# Patient Record
Sex: MALE | Race: WHITE | NOT HISPANIC OR LATINO | Employment: OTHER | URBAN - METROPOLITAN AREA
[De-identification: names, ages, dates, MRNs, and addresses within clinical notes are randomized per-mention and may not be internally consistent; named-entity substitution may affect disease eponyms.]

---

## 2018-08-06 ENCOUNTER — HOSPITAL ENCOUNTER (OUTPATIENT)
Dept: LAB | Facility: HOSPITAL | Age: 83
Discharge: HOME | End: 2018-08-06

## 2020-02-22 ENCOUNTER — HOSPITAL ENCOUNTER (INPATIENT)
Facility: HOSPITAL | Age: 85
LOS: 2 days | Discharge: HOME | End: 2020-02-24
Attending: EMERGENCY MEDICINE | Admitting: INTERNAL MEDICINE

## 2020-02-22 ENCOUNTER — APPOINTMENT (OUTPATIENT)
Dept: GENERAL RADIOLOGY | Facility: HOSPITAL | Age: 85
End: 2020-02-22

## 2020-02-22 DIAGNOSIS — R09.02 HYPOXIA: ICD-10-CM

## 2020-02-22 DIAGNOSIS — J44.1 COPD WITH ACUTE EXACERBATION (HCC): ICD-10-CM

## 2020-02-22 DIAGNOSIS — R06.03 ACUTE RESPIRATORY DISTRESS: Primary | ICD-10-CM

## 2020-02-22 DIAGNOSIS — J18.9 PNEUMONIA OF RIGHT LOWER LOBE DUE TO INFECTIOUS ORGANISM: ICD-10-CM

## 2020-02-22 LAB
ALBUMIN SERPL-MCNC: 3.7 G/DL (ref 3.5–5.2)
ALBUMIN/GLOB SERPL: 0.9 G/DL
ALP SERPL-CCNC: 88 U/L (ref 39–117)
ALT SERPL W P-5'-P-CCNC: 14 U/L (ref 1–41)
ANION GAP SERPL CALCULATED.3IONS-SCNC: 11 MMOL/L (ref 5–15)
AST SERPL-CCNC: 28 U/L (ref 1–40)
B PERT DNA SPEC QL NAA+PROBE: NOT DETECTED
BILIRUB SERPL-MCNC: 2.1 MG/DL (ref 0.2–1.2)
BUN BLD-MCNC: 30 MG/DL (ref 8–23)
BUN/CREAT SERPL: 34.1 (ref 7–25)
C PNEUM DNA NPH QL NAA+NON-PROBE: NOT DETECTED
CALCIUM SPEC-SCNC: 11.4 MG/DL (ref 8.6–10.5)
CHLORIDE SERPL-SCNC: 94 MMOL/L (ref 98–107)
CO2 SERPL-SCNC: 25 MMOL/L (ref 22–29)
CREAT BLD-MCNC: 0.88 MG/DL (ref 0.76–1.27)
DEPRECATED RDW RBC AUTO: 43.8 FL (ref 37–54)
ERYTHROCYTE [DISTWIDTH] IN BLOOD BY AUTOMATED COUNT: 14.9 % (ref 12.3–15.4)
FLUAV H1 2009 PAND RNA NPH QL NAA+PROBE: NOT DETECTED
FLUAV H1 HA GENE NPH QL NAA+PROBE: NOT DETECTED
FLUAV H3 RNA NPH QL NAA+PROBE: NOT DETECTED
FLUAV SUBTYP SPEC NAA+PROBE: NOT DETECTED
FLUAV SUBTYP SPEC NAA+PROBE: NOT DETECTED
FLUBV RNA ISLT QL NAA+PROBE: NOT DETECTED
FLUBV RNA ISLT QL NAA+PROBE: NOT DETECTED
GFR SERPL CREATININE-BSD FRML MDRD: 82 ML/MIN/1.73
GIANT PLATELETS: ABNORMAL
GLOBULIN UR ELPH-MCNC: 4 GM/DL
GLUCOSE BLD-MCNC: 121 MG/DL (ref 65–99)
HADV DNA SPEC NAA+PROBE: NOT DETECTED
HCOV 229E RNA SPEC QL NAA+PROBE: NOT DETECTED
HCOV HKU1 RNA SPEC QL NAA+PROBE: NOT DETECTED
HCOV NL63 RNA SPEC QL NAA+PROBE: NOT DETECTED
HCOV OC43 RNA SPEC QL NAA+PROBE: NOT DETECTED
HCT VFR BLD AUTO: 42.2 % (ref 37.5–51)
HGB BLD-MCNC: 14.6 G/DL (ref 13–17.7)
HMPV RNA NPH QL NAA+NON-PROBE: NOT DETECTED
HPIV1 RNA SPEC QL NAA+PROBE: NOT DETECTED
HPIV2 RNA SPEC QL NAA+PROBE: NOT DETECTED
HPIV3 RNA NPH QL NAA+PROBE: NOT DETECTED
HPIV4 P GENE NPH QL NAA+PROBE: NOT DETECTED
L PNEUMO1 AG UR QL IA: NEGATIVE
LYMPHOCYTES # BLD MANUAL: 0.98 10*3/MM3 (ref 0.7–3.1)
LYMPHOCYTES NFR BLD MANUAL: 12 % (ref 19.6–45.3)
LYMPHOCYTES NFR BLD MANUAL: 14 % (ref 5–12)
M PNEUMO IGG SER IA-ACNC: NOT DETECTED
MCH RBC QN AUTO: 28.9 PG (ref 26.6–33)
MCHC RBC AUTO-ENTMCNC: 34.6 G/DL (ref 31.5–35.7)
MCV RBC AUTO: 83.5 FL (ref 79–97)
MONOCYTES # BLD AUTO: 1.15 10*3/MM3 (ref 0.1–0.9)
NEUTROPHILS # BLD AUTO: 6.07 10*3/MM3 (ref 1.7–7)
NEUTROPHILS NFR BLD MANUAL: 69 % (ref 42.7–76)
NEUTS BAND NFR BLD MANUAL: 5 % (ref 0–5)
NT-PROBNP SERPL-MCNC: 352.8 PG/ML (ref 5–1800)
PLATELET # BLD AUTO: 189 10*3/MM3 (ref 140–450)
PMV BLD AUTO: 8.6 FL (ref 6–12)
POTASSIUM BLD-SCNC: 4.8 MMOL/L (ref 3.5–5.2)
PROT SERPL-MCNC: 7.7 G/DL (ref 6–8.5)
RBC # BLD AUTO: 5.06 10*6/MM3 (ref 4.14–5.8)
RBC MORPH BLD: NORMAL
RHINOVIRUS RNA SPEC NAA+PROBE: DETECTED
RSV RNA NPH QL NAA+NON-PROBE: NOT DETECTED
S PNEUM AG SPEC QL LA: POSITIVE
SCAN SLIDE: NORMAL
SODIUM BLD-SCNC: 130 MMOL/L (ref 136–145)
TROPONIN T SERPL-MCNC: <0.01 NG/ML (ref 0–0.03)
WBC MORPH BLD: NORMAL
WBC NRBC COR # BLD: 8.2 10*3/MM3 (ref 3.4–10.8)

## 2020-02-22 PROCEDURE — 84484 ASSAY OF TROPONIN QUANT: CPT | Performed by: EMERGENCY MEDICINE

## 2020-02-22 PROCEDURE — 87502 INFLUENZA DNA AMP PROBE: CPT | Performed by: EMERGENCY MEDICINE

## 2020-02-22 PROCEDURE — 94640 AIRWAY INHALATION TREATMENT: CPT

## 2020-02-22 PROCEDURE — 87040 BLOOD CULTURE FOR BACTERIA: CPT | Performed by: EMERGENCY MEDICINE

## 2020-02-22 PROCEDURE — 99222 1ST HOSP IP/OBS MODERATE 55: CPT | Performed by: HOSPITALIST

## 2020-02-22 PROCEDURE — 97165 OT EVAL LOW COMPLEX 30 MIN: CPT

## 2020-02-22 PROCEDURE — 87899 AGENT NOS ASSAY W/OPTIC: CPT | Performed by: HOSPITALIST

## 2020-02-22 PROCEDURE — 83880 ASSAY OF NATRIURETIC PEPTIDE: CPT | Performed by: EMERGENCY MEDICINE

## 2020-02-22 PROCEDURE — 85025 COMPLETE CBC W/AUTO DIFF WBC: CPT | Performed by: EMERGENCY MEDICINE

## 2020-02-22 PROCEDURE — 97535 SELF CARE MNGMENT TRAINING: CPT

## 2020-02-22 PROCEDURE — 97116 GAIT TRAINING THERAPY: CPT

## 2020-02-22 PROCEDURE — 25010000002 CEFTRIAXONE PER 250 MG: Performed by: EMERGENCY MEDICINE

## 2020-02-22 PROCEDURE — 80053 COMPREHEN METABOLIC PANEL: CPT | Performed by: EMERGENCY MEDICINE

## 2020-02-22 PROCEDURE — 94799 UNLISTED PULMONARY SVC/PX: CPT

## 2020-02-22 PROCEDURE — 93005 ELECTROCARDIOGRAM TRACING: CPT | Performed by: EMERGENCY MEDICINE

## 2020-02-22 PROCEDURE — 97162 PT EVAL MOD COMPLEX 30 MIN: CPT

## 2020-02-22 PROCEDURE — 25010000002 METHYLPREDNISOLONE PER 125 MG: Performed by: EMERGENCY MEDICINE

## 2020-02-22 PROCEDURE — 99284 EMERGENCY DEPT VISIT MOD MDM: CPT

## 2020-02-22 PROCEDURE — 0099U HC BIOFIRE FILMARRAY RESP PANEL 1: CPT | Performed by: HOSPITALIST

## 2020-02-22 PROCEDURE — 25010000002 ENOXAPARIN PER 10 MG: Performed by: HOSPITALIST

## 2020-02-22 PROCEDURE — 85007 BL SMEAR W/DIFF WBC COUNT: CPT | Performed by: EMERGENCY MEDICINE

## 2020-02-22 PROCEDURE — 71045 X-RAY EXAM CHEST 1 VIEW: CPT

## 2020-02-22 RX ORDER — METHYLPREDNISOLONE SODIUM SUCCINATE 125 MG/2ML
125 INJECTION, POWDER, LYOPHILIZED, FOR SOLUTION INTRAMUSCULAR; INTRAVENOUS ONCE
Status: COMPLETED | OUTPATIENT
Start: 2020-02-22 | End: 2020-02-22

## 2020-02-22 RX ORDER — ASPIRIN 81 MG/1
81 TABLET, CHEWABLE ORAL DAILY
COMMUNITY
End: 2023-01-01

## 2020-02-22 RX ORDER — SODIUM CHLORIDE 0.9 % (FLUSH) 0.9 %
10 SYRINGE (ML) INJECTION AS NEEDED
Status: DISCONTINUED | OUTPATIENT
Start: 2020-02-22 | End: 2020-02-24 | Stop reason: HOSPADM

## 2020-02-22 RX ORDER — IPRATROPIUM BROMIDE AND ALBUTEROL SULFATE 2.5; .5 MG/3ML; MG/3ML
3 SOLUTION RESPIRATORY (INHALATION) ONCE
Status: COMPLETED | OUTPATIENT
Start: 2020-02-22 | End: 2020-02-22

## 2020-02-22 RX ORDER — ALBUTEROL SULFATE 2.5 MG/3ML
2.5 SOLUTION RESPIRATORY (INHALATION) ONCE
Status: COMPLETED | OUTPATIENT
Start: 2020-02-22 | End: 2020-02-22

## 2020-02-22 RX ORDER — IPRATROPIUM BROMIDE AND ALBUTEROL SULFATE 2.5; .5 MG/3ML; MG/3ML
3 SOLUTION RESPIRATORY (INHALATION)
Status: DISCONTINUED | OUTPATIENT
Start: 2020-02-22 | End: 2020-02-24 | Stop reason: HOSPADM

## 2020-02-22 RX ORDER — DOXYCYCLINE 100 MG/1
100 TABLET ORAL EVERY 12 HOURS SCHEDULED
Status: DISCONTINUED | OUTPATIENT
Start: 2020-02-22 | End: 2020-02-24

## 2020-02-22 RX ORDER — GUAIFENESIN/DEXTROMETHORPHAN 100-10MG/5
5 SYRUP ORAL EVERY 4 HOURS PRN
Status: DISCONTINUED | OUTPATIENT
Start: 2020-02-22 | End: 2020-02-24 | Stop reason: HOSPADM

## 2020-02-22 RX ORDER — ASPIRIN 81 MG/1
81 TABLET, CHEWABLE ORAL DAILY
Status: DISCONTINUED | OUTPATIENT
Start: 2020-02-22 | End: 2020-02-24 | Stop reason: HOSPADM

## 2020-02-22 RX ORDER — ALBUTEROL SULFATE 2.5 MG/3ML
2.5 SOLUTION RESPIRATORY (INHALATION) EVERY 6 HOURS PRN
Status: DISCONTINUED | OUTPATIENT
Start: 2020-02-22 | End: 2020-02-24 | Stop reason: HOSPADM

## 2020-02-22 RX ADMIN — ENOXAPARIN SODIUM 40 MG: 40 INJECTION SUBCUTANEOUS at 18:22

## 2020-02-22 RX ADMIN — IPRATROPIUM BROMIDE AND ALBUTEROL SULFATE 3 ML: .5; 3 SOLUTION RESPIRATORY (INHALATION) at 19:06

## 2020-02-22 RX ADMIN — IPRATROPIUM BROMIDE AND ALBUTEROL SULFATE 3 ML: .5; 3 SOLUTION RESPIRATORY (INHALATION) at 10:25

## 2020-02-22 RX ADMIN — ALBUTEROL SULFATE 2.5 MG: 2.5 SOLUTION RESPIRATORY (INHALATION) at 10:25

## 2020-02-22 RX ADMIN — CEFTRIAXONE SODIUM 1 G: 10 INJECTION, POWDER, FOR SOLUTION INTRAVENOUS at 10:12

## 2020-02-22 RX ADMIN — DOXYCYCLINE 100 MG: 100 TABLET, FILM COATED ORAL at 21:08

## 2020-02-22 RX ADMIN — DOXYCYCLINE 100 MG: 100 INJECTION, POWDER, LYOPHILIZED, FOR SOLUTION INTRAVENOUS at 10:12

## 2020-02-22 RX ADMIN — METHYLPREDNISOLONE SODIUM SUCCINATE 125 MG: 125 INJECTION, POWDER, FOR SOLUTION INTRAMUSCULAR; INTRAVENOUS at 11:13

## 2020-02-22 RX ADMIN — IPRATROPIUM BROMIDE AND ALBUTEROL SULFATE 3 ML: .5; 3 SOLUTION RESPIRATORY (INHALATION) at 15:17

## 2020-02-22 RX ADMIN — IPRATROPIUM BROMIDE AND ALBUTEROL SULFATE 3 ML: .5; 3 SOLUTION RESPIRATORY (INHALATION) at 12:04

## 2020-02-22 RX ADMIN — ASPIRIN 81 MG 81 MG: 81 TABLET ORAL at 12:49

## 2020-02-23 PROCEDURE — 94799 UNLISTED PULMONARY SVC/PX: CPT

## 2020-02-23 PROCEDURE — 25010000002 CEFTRIAXONE PER 250 MG: Performed by: HOSPITALIST

## 2020-02-23 PROCEDURE — 99232 SBSQ HOSP IP/OBS MODERATE 35: CPT | Performed by: HOSPITALIST

## 2020-02-23 PROCEDURE — 25010000002 ENOXAPARIN PER 10 MG: Performed by: HOSPITALIST

## 2020-02-23 RX ORDER — GUAIFENESIN 600 MG/1
600 TABLET, EXTENDED RELEASE ORAL EVERY 12 HOURS SCHEDULED
Status: DISCONTINUED | OUTPATIENT
Start: 2020-02-23 | End: 2020-02-24 | Stop reason: HOSPADM

## 2020-02-23 RX ORDER — BENZONATATE 100 MG/1
200 CAPSULE ORAL 3 TIMES DAILY PRN
Status: DISCONTINUED | OUTPATIENT
Start: 2020-02-23 | End: 2020-02-24 | Stop reason: HOSPADM

## 2020-02-23 RX ORDER — CEFDINIR 300 MG/1
300 CAPSULE ORAL EVERY 12 HOURS SCHEDULED
Status: DISCONTINUED | OUTPATIENT
Start: 2020-02-23 | End: 2020-02-24 | Stop reason: HOSPADM

## 2020-02-23 RX ADMIN — CEFDINIR 300 MG: 300 CAPSULE ORAL at 11:59

## 2020-02-23 RX ADMIN — DOXYCYCLINE 100 MG: 100 TABLET, FILM COATED ORAL at 08:37

## 2020-02-23 RX ADMIN — GUAIFENESIN 600 MG: 600 TABLET, EXTENDED RELEASE ORAL at 16:56

## 2020-02-23 RX ADMIN — CEFDINIR 300 MG: 300 CAPSULE ORAL at 20:55

## 2020-02-23 RX ADMIN — IPRATROPIUM BROMIDE AND ALBUTEROL SULFATE 3 ML: .5; 3 SOLUTION RESPIRATORY (INHALATION) at 18:20

## 2020-02-23 RX ADMIN — IPRATROPIUM BROMIDE AND ALBUTEROL SULFATE 3 ML: .5; 3 SOLUTION RESPIRATORY (INHALATION) at 07:57

## 2020-02-23 RX ADMIN — IPRATROPIUM BROMIDE AND ALBUTEROL SULFATE 3 ML: .5; 3 SOLUTION RESPIRATORY (INHALATION) at 12:05

## 2020-02-23 RX ADMIN — CEFTRIAXONE SODIUM 1 G: 1 INJECTION, POWDER, FOR SOLUTION INTRAMUSCULAR; INTRAVENOUS at 08:39

## 2020-02-23 RX ADMIN — IPRATROPIUM BROMIDE AND ALBUTEROL SULFATE 3 ML: .5; 3 SOLUTION RESPIRATORY (INHALATION) at 15:11

## 2020-02-23 RX ADMIN — DOXYCYCLINE 100 MG: 100 TABLET, FILM COATED ORAL at 20:55

## 2020-02-23 RX ADMIN — ASPIRIN 81 MG 81 MG: 81 TABLET ORAL at 08:37

## 2020-02-23 RX ADMIN — ENOXAPARIN SODIUM 40 MG: 40 INJECTION SUBCUTANEOUS at 16:56

## 2020-02-23 RX ADMIN — Medication 10 ML: at 08:37

## 2020-02-24 VITALS
WEIGHT: 159.83 LBS | BODY MASS INDEX: 22.38 KG/M2 | HEIGHT: 71 IN | DIASTOLIC BLOOD PRESSURE: 59 MMHG | SYSTOLIC BLOOD PRESSURE: 132 MMHG | RESPIRATION RATE: 18 BRPM | OXYGEN SATURATION: 92 % | TEMPERATURE: 97.3 F | HEART RATE: 72 BPM

## 2020-02-24 PROBLEM — R53.1 GENERALIZED WEAKNESS: Status: ACTIVE | Noted: 2020-02-24

## 2020-02-24 PROBLEM — J43.9 EMPHYSEMA LUNG (HCC): Status: ACTIVE | Noted: 2020-02-24

## 2020-02-24 PROBLEM — I87.8 CHRONIC VENOUS STASIS: Status: ACTIVE | Noted: 2020-02-24

## 2020-02-24 PROBLEM — J44.1 COPD WITH ACUTE EXACERBATION (HCC): Status: ACTIVE | Noted: 2020-02-24

## 2020-02-24 PROBLEM — J96.01 ACUTE RESPIRATORY FAILURE WITH HYPOXIA: Status: ACTIVE | Noted: 2020-02-24

## 2020-02-24 PROBLEM — E87.1 HYPONATREMIA: Status: ACTIVE | Noted: 2020-02-24

## 2020-02-24 PROCEDURE — 94618 PULMONARY STRESS TESTING: CPT

## 2020-02-24 PROCEDURE — 94799 UNLISTED PULMONARY SVC/PX: CPT

## 2020-02-24 PROCEDURE — 97530 THERAPEUTIC ACTIVITIES: CPT

## 2020-02-24 PROCEDURE — 97110 THERAPEUTIC EXERCISES: CPT

## 2020-02-24 PROCEDURE — 99239 HOSP IP/OBS DSCHRG MGMT >30: CPT | Performed by: INTERNAL MEDICINE

## 2020-02-24 RX ORDER — CEFDINIR 300 MG/1
300 CAPSULE ORAL EVERY 12 HOURS SCHEDULED
Qty: 14 CAPSULE | Refills: 0 | Status: ON HOLD | OUTPATIENT
Start: 2020-02-24 | End: 2022-01-01

## 2020-02-24 RX ORDER — GUAIFENESIN 600 MG/1
600 TABLET, EXTENDED RELEASE ORAL EVERY 12 HOURS SCHEDULED
Qty: 60 TABLET | Refills: 0 | Status: ON HOLD | OUTPATIENT
Start: 2020-02-24 | End: 2022-01-01

## 2020-02-24 RX ORDER — IPRATROPIUM BROMIDE AND ALBUTEROL SULFATE 2.5; .5 MG/3ML; MG/3ML
3 SOLUTION RESPIRATORY (INHALATION)
Qty: 360 ML | Refills: 0 | Status: ON HOLD | OUTPATIENT
Start: 2020-02-24 | End: 2022-01-01

## 2020-02-24 RX ORDER — BENZONATATE 200 MG/1
200 CAPSULE ORAL 3 TIMES DAILY PRN
Qty: 30 CAPSULE | Refills: 0 | Status: ON HOLD | OUTPATIENT
Start: 2020-02-24 | End: 2022-01-01

## 2020-02-24 RX ORDER — PREDNISONE 20 MG/1
20 TABLET ORAL DAILY
Qty: 5 TABLET | Refills: 0 | Status: ON HOLD | OUTPATIENT
Start: 2020-02-24 | End: 2022-01-01

## 2020-02-24 RX ORDER — ALBUTEROL SULFATE 90 UG/1
2 AEROSOL, METERED RESPIRATORY (INHALATION) EVERY 4 HOURS PRN
Qty: 1 INHALER | Refills: 1 | Status: ON HOLD | OUTPATIENT
Start: 2020-02-24 | End: 2022-01-01

## 2020-02-24 RX ADMIN — IPRATROPIUM BROMIDE AND ALBUTEROL SULFATE 3 ML: .5; 3 SOLUTION RESPIRATORY (INHALATION) at 11:22

## 2020-02-24 RX ADMIN — GUAIFENESIN 600 MG: 600 TABLET, EXTENDED RELEASE ORAL at 09:03

## 2020-02-24 RX ADMIN — Medication 10 ML: at 09:04

## 2020-02-24 RX ADMIN — ASPIRIN 81 MG 81 MG: 81 TABLET ORAL at 09:04

## 2020-02-24 RX ADMIN — IPRATROPIUM BROMIDE AND ALBUTEROL SULFATE 3 ML: .5; 3 SOLUTION RESPIRATORY (INHALATION) at 07:14

## 2020-02-24 RX ADMIN — DOXYCYCLINE 100 MG: 100 TABLET, FILM COATED ORAL at 09:04

## 2020-02-24 RX ADMIN — CEFDINIR 300 MG: 300 CAPSULE ORAL at 09:04

## 2020-02-25 ENCOUNTER — READMISSION MANAGEMENT (OUTPATIENT)
Dept: CALL CENTER | Facility: HOSPITAL | Age: 85
End: 2020-02-25

## 2020-02-25 NOTE — OUTREACH NOTE
Prep Survey      Responses   Facility patient discharged from?  [facility]   Is patient eligible?  Yes   Discharge diagnosis  Strep Pneumo Community-acquired pneumonia,        Does the patient have one of the following disease processes/diagnoses(primary or secondary)?  COPD/Pneumonia   Does the patient have Home health ordered?  Yes   What is the Home health agency?   [facility], [place]   Is there a DME ordered?  Yes   What DME was ordered?  [facility] - [place]    Prep survey completed?  Yes          Ryann Salinas RN

## 2020-02-26 ENCOUNTER — READMISSION MANAGEMENT (OUTPATIENT)
Dept: CALL CENTER | Facility: HOSPITAL | Age: 85
End: 2020-02-26

## 2020-02-26 NOTE — OUTREACH NOTE
COPD/PN Week 1 Survey      Responses   Facility patient discharged from?  [facility]   Does the patient have one of the following disease processes/diagnoses(primary or secondary)?  COPD/Pneumonia   Is there a successful TCM telephone encounter documented?  No   Was the primary reason for admission:  Pneumonia   Week 1 attempt successful?  No   Unsuccessful attempts  Attempt 1 [NUMBER WAS BUSY X3 ATTEMPTS]          Chelsea Perry LPN

## 2020-02-27 ENCOUNTER — READMISSION MANAGEMENT (OUTPATIENT)
Dept: CALL CENTER | Facility: HOSPITAL | Age: 85
End: 2020-02-27

## 2020-02-27 LAB
BACTERIA SPEC AEROBE CULT: NORMAL
BACTERIA SPEC AEROBE CULT: NORMAL

## 2020-02-27 NOTE — OUTREACH NOTE
COPD/PN Week 1 Survey      Responses   Facility patient discharged from?  [facility]   Does the patient have one of the following disease processes/diagnoses(primary or secondary)?  COPD/Pneumonia   Is there a successful TCM telephone encounter documented?  No   Was the primary reason for admission:  Pneumonia   Week 1 attempt successful?  No   Unsuccessful attempts  Attempt 2 [PHONE NUMBER BUSY X3]          Chelsea Perry LPN

## 2020-03-02 ENCOUNTER — READMISSION MANAGEMENT (OUTPATIENT)
Dept: CALL CENTER | Facility: HOSPITAL | Age: 85
End: 2020-03-02

## 2020-03-02 NOTE — OUTREACH NOTE
COPD/PN Week 2 Survey      Responses   Facility patient discharged from?  [facility]   Does the patient have one of the following disease processes/diagnoses(primary or secondary)?  COPD/Pneumonia   Was the primary reason for admission:  Pneumonia   Week 2 attempt successful?  No   Unsuccessful attempts  Attempt 1          Guillermina García LPN

## 2020-03-03 ENCOUNTER — READMISSION MANAGEMENT (OUTPATIENT)
Dept: CALL CENTER | Facility: HOSPITAL | Age: 85
End: 2020-03-03

## 2020-03-03 NOTE — OUTREACH NOTE
COPD/PN Week 2 Survey      Responses   [facility] patient discharged from?  [facility]   Does the patient have one of the following disease processes/diagnoses(primary or secondary)?  COPD/Pneumonia   Was the primary reason for admission:  Pneumonia   Week 2 attempt successful?  No   Unsuccessful attempts  Attempt 2   Rescheduled  Revoked   Revoke  Phone number issues [PHONE NUMBER RINGS BUSY EACH ATTEMPT]          Chelsea Perry, NUPURN

## 2021-09-29 ENCOUNTER — LAB (OUTPATIENT)
Dept: LAB | Facility: HOSPITAL | Age: 86
End: 2021-09-29

## 2021-09-29 ENCOUNTER — TRANSCRIBE ORDERS (OUTPATIENT)
Dept: ADMINISTRATIVE | Facility: HOSPITAL | Age: 86
End: 2021-09-29

## 2021-09-29 DIAGNOSIS — Z12.5 SPECIAL SCREENING FOR MALIGNANT NEOPLASM OF PROSTATE: ICD-10-CM

## 2021-09-29 DIAGNOSIS — R53.83 TIREDNESS: Primary | ICD-10-CM

## 2021-09-29 DIAGNOSIS — R53.83 TIREDNESS: ICD-10-CM

## 2021-09-29 LAB
ALBUMIN SERPL-MCNC: 4.4 G/DL (ref 3.5–5.2)
ALBUMIN/GLOB SERPL: 1.4 G/DL
ALP SERPL-CCNC: 92 U/L (ref 39–117)
ALT SERPL W P-5'-P-CCNC: 19 U/L (ref 1–41)
ANION GAP SERPL CALCULATED.3IONS-SCNC: 8.8 MMOL/L (ref 5–15)
AST SERPL-CCNC: 24 U/L (ref 1–40)
BASOPHILS # BLD AUTO: 0.04 10*3/MM3 (ref 0–0.2)
BASOPHILS NFR BLD AUTO: 0.8 % (ref 0–1.5)
BILIRUB SERPL-MCNC: 0.8 MG/DL (ref 0–1.2)
BUN SERPL-MCNC: 19 MG/DL (ref 8–23)
BUN/CREAT SERPL: 20.2 (ref 7–25)
CALCIUM SPEC-SCNC: 10.9 MG/DL (ref 8.6–10.5)
CHLORIDE SERPL-SCNC: 102 MMOL/L (ref 98–107)
CHOLEST SERPL-MCNC: 153 MG/DL (ref 0–200)
CO2 SERPL-SCNC: 25.2 MMOL/L (ref 22–29)
CREAT SERPL-MCNC: 0.94 MG/DL (ref 0.76–1.27)
DEPRECATED RDW RBC AUTO: 43.6 FL (ref 37–54)
EOSINOPHIL # BLD AUTO: 0.28 10*3/MM3 (ref 0–0.4)
EOSINOPHIL NFR BLD AUTO: 5.3 % (ref 0.3–6.2)
ERYTHROCYTE [DISTWIDTH] IN BLOOD BY AUTOMATED COUNT: 13.6 % (ref 12.3–15.4)
GFR SERPL CREATININE-BSD FRML MDRD: 76 ML/MIN/1.73
GLOBULIN UR ELPH-MCNC: 3.2 GM/DL
GLUCOSE SERPL-MCNC: 95 MG/DL (ref 65–99)
HCT VFR BLD AUTO: 46 % (ref 37.5–51)
HDLC SERPL-MCNC: 31 MG/DL (ref 40–60)
HGB BLD-MCNC: 15 G/DL (ref 13–17.7)
IMM GRANULOCYTES # BLD AUTO: 0.02 10*3/MM3 (ref 0–0.05)
IMM GRANULOCYTES NFR BLD AUTO: 0.4 % (ref 0–0.5)
LDLC SERPL CALC-MCNC: 102 MG/DL (ref 0–100)
LDLC/HDLC SERPL: 3.25 {RATIO}
LYMPHOCYTES # BLD AUTO: 1.21 10*3/MM3 (ref 0.7–3.1)
LYMPHOCYTES NFR BLD AUTO: 22.8 % (ref 19.6–45.3)
MCH RBC QN AUTO: 28.7 PG (ref 26.6–33)
MCHC RBC AUTO-ENTMCNC: 32.6 G/DL (ref 31.5–35.7)
MCV RBC AUTO: 88 FL (ref 79–97)
MONOCYTES # BLD AUTO: 0.62 10*3/MM3 (ref 0.1–0.9)
MONOCYTES NFR BLD AUTO: 11.7 % (ref 5–12)
NEUTROPHILS NFR BLD AUTO: 3.14 10*3/MM3 (ref 1.7–7)
NEUTROPHILS NFR BLD AUTO: 59 % (ref 42.7–76)
NRBC BLD AUTO-RTO: 0 /100 WBC (ref 0–0.2)
PLATELET # BLD AUTO: 177 10*3/MM3 (ref 140–450)
PMV BLD AUTO: 11 FL (ref 6–12)
POTASSIUM SERPL-SCNC: 4.3 MMOL/L (ref 3.5–5.2)
PROT SERPL-MCNC: 7.6 G/DL (ref 6–8.5)
PSA SERPL-MCNC: 2.75 NG/ML (ref 0–4)
RBC # BLD AUTO: 5.23 10*6/MM3 (ref 4.14–5.8)
SODIUM SERPL-SCNC: 136 MMOL/L (ref 136–145)
TRIGL SERPL-MCNC: 107 MG/DL (ref 0–150)
VLDLC SERPL-MCNC: 20 MG/DL (ref 5–40)
WBC # BLD AUTO: 5.31 10*3/MM3 (ref 3.4–10.8)

## 2021-09-29 PROCEDURE — 36415 COLL VENOUS BLD VENIPUNCTURE: CPT

## 2021-09-29 PROCEDURE — 80053 COMPREHEN METABOLIC PANEL: CPT

## 2021-09-29 PROCEDURE — G0103 PSA SCREENING: HCPCS

## 2021-09-29 PROCEDURE — 80061 LIPID PANEL: CPT

## 2021-09-29 PROCEDURE — 85025 COMPLETE CBC W/AUTO DIFF WBC: CPT

## 2022-01-01 ENCOUNTER — HOSPITAL ENCOUNTER (OUTPATIENT)
Dept: ONCOLOGY | Facility: HOSPITAL | Age: 87
Setting detail: INFUSION SERIES
Discharge: HOME | End: 2022-11-02

## 2022-01-01 ENCOUNTER — TRANSCRIBE ORDERS (OUTPATIENT)
Dept: ADMINISTRATIVE | Facility: HOSPITAL | Age: 87
End: 2022-01-01

## 2022-01-01 ENCOUNTER — RADIATION ONCOLOGY WEEKLY ASSESSMENT (OUTPATIENT)
Dept: RADIATION ONCOLOGY | Facility: HOSPITAL | Age: 87
End: 2022-01-01

## 2022-01-01 ENCOUNTER — HOSPITAL ENCOUNTER (OUTPATIENT)
Dept: ONCOLOGY | Facility: HOSPITAL | Age: 87
Setting detail: INFUSION SERIES
Discharge: HOME | End: 2022-10-12

## 2022-01-01 ENCOUNTER — HOSPITAL ENCOUNTER (OUTPATIENT)
Dept: INTERVENTIONAL RADIOLOGY/VASCULAR | Facility: HOSPITAL | Age: 87
Discharge: HOME | End: 2022-12-21

## 2022-01-01 ENCOUNTER — HOSPITAL ENCOUNTER (OUTPATIENT)
Dept: ONCOLOGY | Facility: HOSPITAL | Age: 87
Setting detail: INFUSION SERIES
Discharge: HOME | End: 2022-11-23

## 2022-01-01 ENCOUNTER — HOSPITAL ENCOUNTER (OUTPATIENT)
Dept: CT IMAGING | Facility: HOSPITAL | Age: 87
Discharge: HOME | End: 2022-09-13

## 2022-01-01 ENCOUNTER — HOSPITAL ENCOUNTER (OUTPATIENT)
Dept: CT IMAGING | Facility: HOSPITAL | Age: 87
Discharge: HOME | End: 2022-12-15
Admitting: INTERNAL MEDICINE

## 2022-01-01 ENCOUNTER — APPOINTMENT (OUTPATIENT)
Dept: CT IMAGING | Facility: HOSPITAL | Age: 87
End: 2022-01-01

## 2022-01-01 ENCOUNTER — APPOINTMENT (OUTPATIENT)
Dept: LAB | Facility: HOSPITAL | Age: 87
End: 2022-01-01

## 2022-01-01 ENCOUNTER — APPOINTMENT (OUTPATIENT)
Dept: GENERAL RADIOLOGY | Facility: HOSPITAL | Age: 87
End: 2022-01-01

## 2022-01-01 ENCOUNTER — READMISSION MANAGEMENT (OUTPATIENT)
Dept: CALL CENTER | Facility: HOSPITAL | Age: 87
End: 2022-01-01

## 2022-01-01 ENCOUNTER — LAB (OUTPATIENT)
Dept: LAB | Facility: HOSPITAL | Age: 87
End: 2022-01-01

## 2022-01-01 ENCOUNTER — HOSPITAL ENCOUNTER (OUTPATIENT)
Dept: RADIATION ONCOLOGY | Facility: HOSPITAL | Age: 87
Discharge: HOME | End: 2022-09-22

## 2022-01-01 ENCOUNTER — OFFICE VISIT (OUTPATIENT)
Dept: ONCOLOGY | Facility: CLINIC | Age: 87
End: 2022-01-01

## 2022-01-01 ENCOUNTER — CLINICAL SUPPORT (OUTPATIENT)
Dept: ONCOLOGY | Facility: CLINIC | Age: 87
End: 2022-01-01

## 2022-01-01 ENCOUNTER — APPOINTMENT (OUTPATIENT)
Dept: MRI IMAGING | Facility: HOSPITAL | Age: 87
End: 2022-01-01

## 2022-01-01 ENCOUNTER — HOSPITAL ENCOUNTER (OUTPATIENT)
Dept: ONCOLOGY | Facility: HOSPITAL | Age: 87
Setting detail: INFUSION SERIES
Discharge: HOME | End: 2022-12-02

## 2022-01-01 ENCOUNTER — TELEPHONE (OUTPATIENT)
Dept: ONCOLOGY | Facility: CLINIC | Age: 87
End: 2022-01-01

## 2022-01-01 ENCOUNTER — HOSPITAL ENCOUNTER (OUTPATIENT)
Dept: ONCOLOGY | Facility: HOSPITAL | Age: 87
Setting detail: INFUSION SERIES
Discharge: HOME | End: 2022-12-15

## 2022-01-01 ENCOUNTER — OFFICE VISIT (OUTPATIENT)
Dept: RADIATION ONCOLOGY | Facility: HOSPITAL | Age: 87
End: 2022-01-01
Payer: MEDICARE

## 2022-01-01 ENCOUNTER — HOSPITAL ENCOUNTER (OUTPATIENT)
Dept: RADIATION ONCOLOGY | Facility: HOSPITAL | Age: 87
Setting detail: RADIATION/ONCOLOGY SERIES
End: 2022-01-01

## 2022-01-01 ENCOUNTER — HOSPITAL ENCOUNTER (OUTPATIENT)
Dept: RADIATION ONCOLOGY | Facility: HOSPITAL | Age: 87
Discharge: HOME | End: 2022-09-23

## 2022-01-01 ENCOUNTER — RESEARCH ENCOUNTER (OUTPATIENT)
Dept: OTHER | Facility: OTHER | Age: 87
End: 2022-01-01

## 2022-01-01 ENCOUNTER — HOSPITAL ENCOUNTER (OUTPATIENT)
Dept: ONCOLOGY | Facility: HOSPITAL | Age: 87
Setting detail: INFUSION SERIES
Discharge: HOME | End: 2022-12-14

## 2022-01-01 ENCOUNTER — HOSPITAL ENCOUNTER (INPATIENT)
Facility: HOSPITAL | Age: 87
LOS: 9 days | Discharge: HOME | End: 2022-09-22
Attending: EMERGENCY MEDICINE | Admitting: FAMILY MEDICINE

## 2022-01-01 ENCOUNTER — HOSPITAL ENCOUNTER (OUTPATIENT)
Dept: GENERAL RADIOLOGY | Facility: HOSPITAL | Age: 87
Discharge: HOME | End: 2022-08-29
Admitting: FAMILY MEDICINE

## 2022-01-01 ENCOUNTER — HOSPITAL ENCOUNTER (OUTPATIENT)
Dept: GENERAL RADIOLOGY | Facility: HOSPITAL | Age: 87
Discharge: HOME | End: 2022-12-21

## 2022-01-01 ENCOUNTER — HOSPITAL ENCOUNTER (OUTPATIENT)
Dept: RADIATION ONCOLOGY | Facility: HOSPITAL | Age: 87
Setting detail: RADIATION/ONCOLOGY SERIES
End: 2022-01-01
Payer: MEDICARE

## 2022-01-01 VITALS
RESPIRATION RATE: 18 BRPM | SYSTOLIC BLOOD PRESSURE: 132 MMHG | BODY MASS INDEX: 19.85 KG/M2 | DIASTOLIC BLOOD PRESSURE: 75 MMHG | HEIGHT: 71 IN | OXYGEN SATURATION: 99 % | WEIGHT: 141.8 LBS | TEMPERATURE: 97.8 F | HEART RATE: 94 BPM

## 2022-01-01 VITALS
BODY MASS INDEX: 19.6 KG/M2 | DIASTOLIC BLOOD PRESSURE: 80 MMHG | WEIGHT: 140 LBS | HEIGHT: 71 IN | HEART RATE: 52 BPM | RESPIRATION RATE: 18 BRPM | TEMPERATURE: 96.9 F | SYSTOLIC BLOOD PRESSURE: 121 MMHG

## 2022-01-01 VITALS
HEIGHT: 71 IN | TEMPERATURE: 98.6 F | RESPIRATION RATE: 20 BRPM | SYSTOLIC BLOOD PRESSURE: 107 MMHG | WEIGHT: 148.4 LBS | OXYGEN SATURATION: 86 % | DIASTOLIC BLOOD PRESSURE: 65 MMHG | BODY MASS INDEX: 20.77 KG/M2 | HEART RATE: 106 BPM

## 2022-01-01 VITALS
RESPIRATION RATE: 24 BRPM | HEIGHT: 71 IN | BODY MASS INDEX: 20.86 KG/M2 | TEMPERATURE: 97.3 F | WEIGHT: 149 LBS | OXYGEN SATURATION: 78 % | SYSTOLIC BLOOD PRESSURE: 137 MMHG | HEART RATE: 109 BPM | DIASTOLIC BLOOD PRESSURE: 76 MMHG

## 2022-01-01 VITALS
SYSTOLIC BLOOD PRESSURE: 145 MMHG | OXYGEN SATURATION: 91 % | WEIGHT: 148 LBS | HEART RATE: 98 BPM | TEMPERATURE: 98 F | DIASTOLIC BLOOD PRESSURE: 84 MMHG | BODY MASS INDEX: 20.72 KG/M2 | RESPIRATION RATE: 20 BRPM | HEIGHT: 71 IN

## 2022-01-01 VITALS
WEIGHT: 140 LBS | BODY MASS INDEX: 19.6 KG/M2 | HEIGHT: 71 IN | SYSTOLIC BLOOD PRESSURE: 125 MMHG | DIASTOLIC BLOOD PRESSURE: 71 MMHG | HEART RATE: 99 BPM | TEMPERATURE: 98.1 F | OXYGEN SATURATION: 87 %

## 2022-01-01 VITALS
DIASTOLIC BLOOD PRESSURE: 81 MMHG | OXYGEN SATURATION: 91 % | TEMPERATURE: 97.9 F | HEIGHT: 71 IN | WEIGHT: 145 LBS | BODY MASS INDEX: 20.3 KG/M2 | HEART RATE: 103 BPM | RESPIRATION RATE: 20 BRPM | SYSTOLIC BLOOD PRESSURE: 157 MMHG

## 2022-01-01 VITALS
DIASTOLIC BLOOD PRESSURE: 67 MMHG | BODY MASS INDEX: 19.6 KG/M2 | SYSTOLIC BLOOD PRESSURE: 132 MMHG | HEIGHT: 71 IN | WEIGHT: 140 LBS | TEMPERATURE: 96.9 F | OXYGEN SATURATION: 87 % | HEART RATE: 91 BPM

## 2022-01-01 VITALS
SYSTOLIC BLOOD PRESSURE: 137 MMHG | TEMPERATURE: 97.3 F | RESPIRATION RATE: 24 BRPM | BODY MASS INDEX: 20.86 KG/M2 | HEIGHT: 71 IN | OXYGEN SATURATION: 90 % | DIASTOLIC BLOOD PRESSURE: 76 MMHG | WEIGHT: 149 LBS | HEART RATE: 109 BPM

## 2022-01-01 VITALS
DIASTOLIC BLOOD PRESSURE: 53 MMHG | HEART RATE: 95 BPM | SYSTOLIC BLOOD PRESSURE: 96 MMHG | HEIGHT: 71 IN | OXYGEN SATURATION: 100 % | RESPIRATION RATE: 18 BRPM | BODY MASS INDEX: 20.72 KG/M2 | WEIGHT: 148 LBS

## 2022-01-01 VITALS
SYSTOLIC BLOOD PRESSURE: 152 MMHG | HEART RATE: 79 BPM | DIASTOLIC BLOOD PRESSURE: 82 MMHG | RESPIRATION RATE: 20 BRPM | OXYGEN SATURATION: 94 %

## 2022-01-01 VITALS
BODY MASS INDEX: 20.58 KG/M2 | RESPIRATION RATE: 20 BRPM | HEIGHT: 71 IN | SYSTOLIC BLOOD PRESSURE: 145 MMHG | TEMPERATURE: 98 F | DIASTOLIC BLOOD PRESSURE: 84 MMHG | OXYGEN SATURATION: 91 % | WEIGHT: 147 LBS | HEART RATE: 98 BPM

## 2022-01-01 VITALS
OXYGEN SATURATION: 95 % | RESPIRATION RATE: 20 BRPM | TEMPERATURE: 97.7 F | SYSTOLIC BLOOD PRESSURE: 132 MMHG | DIASTOLIC BLOOD PRESSURE: 86 MMHG | HEART RATE: 89 BPM

## 2022-01-01 DIAGNOSIS — C34.11 MALIGNANT NEOPLASM OF UPPER LOBE OF RIGHT LUNG: Primary | ICD-10-CM

## 2022-01-01 DIAGNOSIS — Z71.9 ENCOUNTER FOR EDUCATION: Primary | ICD-10-CM

## 2022-01-01 DIAGNOSIS — R91.8 MASS OF UPPER LOBE OF RIGHT LUNG: ICD-10-CM

## 2022-01-01 DIAGNOSIS — C34.11 MALIGNANT NEOPLASM OF UPPER LOBE OF RIGHT LUNG: ICD-10-CM

## 2022-01-01 DIAGNOSIS — E86.0 DEHYDRATION: Primary | ICD-10-CM

## 2022-01-01 DIAGNOSIS — R06.00 DYSPNEA, UNSPECIFIED TYPE: Primary | ICD-10-CM

## 2022-01-01 DIAGNOSIS — R06.00 DYSPNEA, UNSPECIFIED TYPE: ICD-10-CM

## 2022-01-01 DIAGNOSIS — J90 PLEURAL EFFUSION: ICD-10-CM

## 2022-01-01 DIAGNOSIS — R06.02 SHORTNESS OF BREATH: Primary | ICD-10-CM

## 2022-01-01 DIAGNOSIS — R09.02 HYPOXIA: ICD-10-CM

## 2022-01-01 DIAGNOSIS — R53.83 TIREDNESS: Primary | ICD-10-CM

## 2022-01-01 DIAGNOSIS — J90 PLEURAL EFFUSION: Primary | ICD-10-CM

## 2022-01-01 DIAGNOSIS — K59.00 CONSTIPATION, UNSPECIFIED CONSTIPATION TYPE: ICD-10-CM

## 2022-01-01 DIAGNOSIS — Z12.5 SPECIAL SCREENING FOR MALIGNANT NEOPLASM OF PROSTATE: Primary | ICD-10-CM

## 2022-01-01 LAB
ALBUMIN FLD-MCNC: 2.5 G/DL
ALBUMIN SERPL-MCNC: 3.2 G/DL (ref 3.5–5.2)
ALBUMIN SERPL-MCNC: 3.3 G/DL (ref 3.5–5.2)
ALBUMIN SERPL-MCNC: 3.4 G/DL (ref 3.5–5.2)
ALBUMIN SERPL-MCNC: 3.5 G/DL (ref 3.5–5.2)
ALBUMIN SERPL-MCNC: 3.6 G/DL (ref 3.5–5.2)
ALBUMIN SERPL-MCNC: 3.6 G/DL (ref 3.5–5.2)
ALBUMIN SERPL-MCNC: 3.8 G/DL (ref 3.5–5.2)
ALBUMIN SERPL-MCNC: 3.9 G/DL (ref 3.5–5.2)
ALBUMIN/GLOB SERPL: 0.9 G/DL
ALBUMIN/GLOB SERPL: 1 G/DL
ALBUMIN/GLOB SERPL: 1.2 G/DL
ALBUMIN/GLOB SERPL: 1.3 G/DL
ALBUMIN/GLOB SERPL: 1.4 G/DL
ALBUMIN/GLOB SERPL: 1.4 G/DL
ALP SERPL-CCNC: 100 U/L (ref 39–117)
ALP SERPL-CCNC: 102 U/L (ref 39–117)
ALP SERPL-CCNC: 102 U/L (ref 39–117)
ALP SERPL-CCNC: 107 U/L (ref 39–117)
ALP SERPL-CCNC: 107 U/L (ref 39–117)
ALP SERPL-CCNC: 108 U/L (ref 39–117)
ALP SERPL-CCNC: 87 U/L (ref 39–117)
ALP SERPL-CCNC: 90 U/L (ref 39–117)
ALT SERPL W P-5'-P-CCNC: 11 U/L (ref 1–41)
ALT SERPL W P-5'-P-CCNC: 12 U/L (ref 1–41)
ALT SERPL W P-5'-P-CCNC: 15 U/L (ref 1–41)
ALT SERPL W P-5'-P-CCNC: 22 U/L (ref 1–41)
ALT SERPL W P-5'-P-CCNC: 34 U/L (ref 1–41)
ALT SERPL W P-5'-P-CCNC: 7 U/L (ref 1–41)
ALT SERPL W P-5'-P-CCNC: 9 U/L (ref 1–41)
ALT SERPL W P-5'-P-CCNC: 9 U/L (ref 1–41)
ANION GAP SERPL CALCULATED.3IONS-SCNC: 10 MMOL/L (ref 5–15)
ANION GAP SERPL CALCULATED.3IONS-SCNC: 11 MMOL/L (ref 5–15)
ANION GAP SERPL CALCULATED.3IONS-SCNC: 11 MMOL/L (ref 5–15)
ANION GAP SERPL CALCULATED.3IONS-SCNC: 12 MMOL/L (ref 5–15)
ANION GAP SERPL CALCULATED.3IONS-SCNC: 12 MMOL/L (ref 5–15)
ANION GAP SERPL CALCULATED.3IONS-SCNC: 6 MMOL/L (ref 5–15)
ANION GAP SERPL CALCULATED.3IONS-SCNC: 7 MMOL/L (ref 5–15)
APPEARANCE FLD: ABNORMAL
ARTERIAL PATENCY WRIST A: POSITIVE
AST SERPL-CCNC: 16 U/L (ref 1–40)
AST SERPL-CCNC: 16 U/L (ref 1–40)
AST SERPL-CCNC: 17 U/L (ref 1–40)
AST SERPL-CCNC: 17 U/L (ref 1–40)
AST SERPL-CCNC: 18 U/L (ref 1–40)
AST SERPL-CCNC: 19 U/L (ref 1–40)
AST SERPL-CCNC: 24 U/L (ref 1–40)
AST SERPL-CCNC: 33 U/L (ref 1–40)
ATMOSPHERIC PRESS: ABNORMAL MM[HG]
B PARAPERT DNA SPEC QL NAA+PROBE: NOT DETECTED
B PERT DNA SPEC QL NAA+PROBE: NOT DETECTED
BACTERIA FLD CULT: NORMAL
BACTERIA SPEC AEROBE CULT: NORMAL
BACTERIA SPEC AEROBE CULT: NORMAL
BACTERIA SPEC ANAEROBE CULT: NORMAL
BASE EXCESS BLDA CALC-SCNC: -0.8 MMOL/L (ref 0–3)
BASOPHILS # BLD AUTO: 0 10*3/MM3 (ref 0–0.2)
BASOPHILS # BLD AUTO: 0.02 10*3/MM3 (ref 0–0.2)
BASOPHILS # BLD AUTO: 0.03 10*3/MM3 (ref 0–0.2)
BASOPHILS # BLD AUTO: 0.05 10*3/MM3 (ref 0–0.2)
BASOPHILS # BLD AUTO: 0.1 10*3/MM3 (ref 0–0.2)
BASOPHILS NFR BLD AUTO: 0.3 % (ref 0–1.5)
BASOPHILS NFR BLD AUTO: 0.3 % (ref 0–1.5)
BASOPHILS NFR BLD AUTO: 0.4 % (ref 0–1.5)
BASOPHILS NFR BLD AUTO: 0.5 % (ref 0–1.5)
BASOPHILS NFR BLD AUTO: 0.5 % (ref 0–1.5)
BASOPHILS NFR BLD AUTO: 0.6 % (ref 0–1.5)
BASOPHILS NFR BLD AUTO: 1.1 % (ref 0–1.5)
BDY SITE: ABNORMAL
BILIRUB SERPL-MCNC: 0.5 MG/DL (ref 0–1.2)
BILIRUB SERPL-MCNC: 0.6 MG/DL (ref 0–1.2)
BILIRUB SERPL-MCNC: 0.7 MG/DL (ref 0–1.2)
BILIRUB SERPL-MCNC: 0.9 MG/DL (ref 0–1.2)
BILIRUB SERPL-MCNC: 1.4 MG/DL (ref 0–1.2)
BILIRUB SERPL-MCNC: 1.4 MG/DL (ref 0–1.2)
BUN SERPL-MCNC: 11 MG/DL (ref 8–23)
BUN SERPL-MCNC: 11 MG/DL (ref 8–23)
BUN SERPL-MCNC: 12 MG/DL (ref 8–23)
BUN SERPL-MCNC: 13 MG/DL (ref 8–23)
BUN SERPL-MCNC: 13 MG/DL (ref 8–23)
BUN SERPL-MCNC: 14 MG/DL (ref 8–23)
BUN SERPL-MCNC: 15 MG/DL (ref 8–23)
BUN SERPL-MCNC: 18 MG/DL (ref 8–23)
BUN SERPL-MCNC: 9 MG/DL (ref 8–23)
BUN/CREAT SERPL: 14.8 (ref 7–25)
BUN/CREAT SERPL: 16.2 (ref 7–25)
BUN/CREAT SERPL: 16.5 (ref 7–25)
BUN/CREAT SERPL: 17.4 (ref 7–25)
BUN/CREAT SERPL: 17.5 (ref 7–25)
BUN/CREAT SERPL: 17.8 (ref 7–25)
BUN/CREAT SERPL: 20 (ref 7–25)
BUN/CREAT SERPL: 20.2 (ref 7–25)
BUN/CREAT SERPL: 23 (ref 7–25)
C PNEUM DNA NPH QL NAA+NON-PROBE: NOT DETECTED
CALCIUM SPEC-SCNC: 10.2 MG/DL (ref 8.6–10.5)
CALCIUM SPEC-SCNC: 10.3 MG/DL (ref 8.6–10.5)
CALCIUM SPEC-SCNC: 10.5 MG/DL (ref 8.6–10.5)
CALCIUM SPEC-SCNC: 10.8 MG/DL (ref 8.6–10.5)
CALCIUM SPEC-SCNC: 11 MG/DL (ref 8.6–10.5)
CALCIUM SPEC-SCNC: 11.1 MG/DL (ref 8.6–10.5)
CALCIUM SPEC-SCNC: 11.2 MG/DL (ref 8.6–10.5)
CALCIUM SPEC-SCNC: 11.8 MG/DL (ref 8.6–10.5)
CALCIUM SPEC-SCNC: 11.8 MG/DL (ref 8.6–10.5)
CHLORIDE SERPL-SCNC: 100 MMOL/L (ref 98–107)
CHLORIDE SERPL-SCNC: 101 MMOL/L (ref 98–107)
CHLORIDE SERPL-SCNC: 103 MMOL/L (ref 98–107)
CHLORIDE SERPL-SCNC: 93 MMOL/L (ref 98–107)
CHLORIDE SERPL-SCNC: 95 MMOL/L (ref 98–107)
CHLORIDE SERPL-SCNC: 96 MMOL/L (ref 98–107)
CHLORIDE SERPL-SCNC: 97 MMOL/L (ref 98–107)
CHLORIDE SERPL-SCNC: 98 MMOL/L (ref 98–107)
CHLORIDE SERPL-SCNC: 99 MMOL/L (ref 98–107)
CHOLEST FLD-MCNC: 90 MG/DL
CO2 BLDA-SCNC: 25.8 MMOL/L (ref 22–29)
CO2 SERPL-SCNC: 26 MMOL/L (ref 22–29)
CO2 SERPL-SCNC: 26 MMOL/L (ref 22–29)
CO2 SERPL-SCNC: 28 MMOL/L (ref 22–29)
CO2 SERPL-SCNC: 29 MMOL/L (ref 22–29)
CO2 SERPL-SCNC: 30 MMOL/L (ref 22–29)
CO2 SERPL-SCNC: 30 MMOL/L (ref 22–29)
CO2 SERPL-SCNC: 31 MMOL/L (ref 22–29)
CO2 SERPL-SCNC: 31 MMOL/L (ref 22–29)
CO2 SERPL-SCNC: 32 MMOL/L (ref 22–29)
COLOR FLD: ABNORMAL
CREAT BLDA-MCNC: 1 MG/DL (ref 0.6–1.3)
CREAT SERPL-MCNC: 0.6 MG/DL (ref 0.76–1.27)
CREAT SERPL-MCNC: 0.61 MG/DL (ref 0.76–1.27)
CREAT SERPL-MCNC: 0.61 MG/DL (ref 0.76–1.27)
CREAT SERPL-MCNC: 0.63 MG/DL (ref 0.76–1.27)
CREAT SERPL-MCNC: 0.68 MG/DL (ref 0.76–1.27)
CREAT SERPL-MCNC: 0.73 MG/DL (ref 0.76–1.27)
CREAT SERPL-MCNC: 0.79 MG/DL (ref 0.76–1.27)
CREAT SERPL-MCNC: 0.86 MG/DL (ref 0.76–1.27)
CREAT SERPL-MCNC: 0.89 MG/DL (ref 0.76–1.27)
D-LACTATE SERPL-SCNC: 1.7 MMOL/L (ref 0.5–2)
DEPRECATED RDW RBC AUTO: 45.5 FL (ref 37–54)
DEPRECATED RDW RBC AUTO: 45.5 FL (ref 37–54)
DEPRECATED RDW RBC AUTO: 46.8 FL (ref 37–54)
DEPRECATED RDW RBC AUTO: 47.3 FL (ref 37–54)
DEPRECATED RDW RBC AUTO: 47.8 FL (ref 37–54)
DEPRECATED RDW RBC AUTO: 50.1 FL (ref 37–54)
DEPRECATED RDW RBC AUTO: 50.1 FL (ref 37–54)
DEPRECATED RDW RBC AUTO: 50.3 FL (ref 37–54)
DEPRECATED RDW RBC AUTO: 50.6 FL (ref 37–54)
DEPRECATED RDW RBC AUTO: 50.6 FL (ref 37–54)
DEPRECATED RDW RBC AUTO: 50.8 FL (ref 37–54)
EGFRCR SERPLBLD CKD-EPI 2021: 71.9 ML/MIN/1.73
EGFRCR SERPLBLD CKD-EPI 2021: 81.9 ML/MIN/1.73
EGFRCR SERPLBLD CKD-EPI 2021: 82.8 ML/MIN/1.73
EGFRCR SERPLBLD CKD-EPI 2021: 84.9 ML/MIN/1.73
EGFRCR SERPLBLD CKD-EPI 2021: 87 ML/MIN/1.73
EGFRCR SERPLBLD CKD-EPI 2021: 88.9 ML/MIN/1.73
EGFRCR SERPLBLD CKD-EPI 2021: 90.9 ML/MIN/1.73
EGFRCR SERPLBLD CKD-EPI 2021: 91.8 ML/MIN/1.73
EGFRCR SERPLBLD CKD-EPI 2021: 91.8 ML/MIN/1.73
EGFRCR SERPLBLD CKD-EPI 2021: 92.3 ML/MIN/1.73
EOSINOPHIL # BLD AUTO: 0 10*3/MM3 (ref 0–0.4)
EOSINOPHIL # BLD AUTO: 0 10*3/MM3 (ref 0–0.4)
EOSINOPHIL # BLD AUTO: 0.07 10*3/MM3 (ref 0–0.4)
EOSINOPHIL # BLD AUTO: 0.1 10*3/MM3 (ref 0–0.4)
EOSINOPHIL # BLD AUTO: 0.1 10*3/MM3 (ref 0–0.4)
EOSINOPHIL # BLD AUTO: 0.11 10*3/MM3 (ref 0–0.4)
EOSINOPHIL # BLD AUTO: 0.12 10*3/MM3 (ref 0–0.4)
EOSINOPHIL # BLD AUTO: 0.15 10*3/MM3 (ref 0–0.4)
EOSINOPHIL # BLD AUTO: 0.16 10*3/MM3 (ref 0–0.4)
EOSINOPHIL # BLD AUTO: 0.18 10*3/MM3 (ref 0–0.4)
EOSINOPHIL # BLD AUTO: 0.18 10*3/MM3 (ref 0–0.4)
EOSINOPHIL NFR BLD AUTO: 0.4 % (ref 0.3–6.2)
EOSINOPHIL NFR BLD AUTO: 0.5 % (ref 0.3–6.2)
EOSINOPHIL NFR BLD AUTO: 0.9 % (ref 0.3–6.2)
EOSINOPHIL NFR BLD AUTO: 1.5 % (ref 0.3–6.2)
EOSINOPHIL NFR BLD AUTO: 1.7 % (ref 0.3–6.2)
EOSINOPHIL NFR BLD AUTO: 1.7 % (ref 0.3–6.2)
EOSINOPHIL NFR BLD AUTO: 2.4 % (ref 0.3–6.2)
EOSINOPHIL NFR BLD AUTO: 3 % (ref 0.3–6.2)
EOSINOPHIL NFR BLD AUTO: 3.1 % (ref 0.3–6.2)
EOSINOPHIL NFR BLD AUTO: 3.3 % (ref 0.3–6.2)
EOSINOPHIL NFR BLD AUTO: 4.1 % (ref 0.3–6.2)
ERYTHROCYTE [DISTWIDTH] IN BLOOD BY AUTOMATED COUNT: 14.9 % (ref 12.3–15.4)
ERYTHROCYTE [DISTWIDTH] IN BLOOD BY AUTOMATED COUNT: 15.1 % (ref 12.3–15.4)
ERYTHROCYTE [DISTWIDTH] IN BLOOD BY AUTOMATED COUNT: 15.4 % (ref 12.3–15.4)
ERYTHROCYTE [DISTWIDTH] IN BLOOD BY AUTOMATED COUNT: 15.4 % (ref 12.3–15.4)
ERYTHROCYTE [DISTWIDTH] IN BLOOD BY AUTOMATED COUNT: 15.5 % (ref 12.3–15.4)
ERYTHROCYTE [DISTWIDTH] IN BLOOD BY AUTOMATED COUNT: 15.6 % (ref 12.3–15.4)
ERYTHROCYTE [DISTWIDTH] IN BLOOD BY AUTOMATED COUNT: 15.7 % (ref 12.3–15.4)
ERYTHROCYTE [DISTWIDTH] IN BLOOD BY AUTOMATED COUNT: 15.7 % (ref 12.3–15.4)
ERYTHROCYTE [DISTWIDTH] IN BLOOD BY AUTOMATED COUNT: 15.8 % (ref 12.3–15.4)
FLUAV SUBTYP SPEC NAA+PROBE: NOT DETECTED
FLUBV RNA ISLT QL NAA+PROBE: NOT DETECTED
GLOBULIN UR ELPH-MCNC: 2.8 GM/DL
GLOBULIN UR ELPH-MCNC: 2.9 GM/DL
GLOBULIN UR ELPH-MCNC: 3.2 GM/DL
GLOBULIN UR ELPH-MCNC: 3.4 GM/DL
GLOBULIN UR ELPH-MCNC: 3.5 GM/DL
GLOBULIN UR ELPH-MCNC: 3.5 GM/DL
GLUCOSE BLDC GLUCOMTR-MCNC: 110 MG/DL (ref 70–105)
GLUCOSE BLDC GLUCOMTR-MCNC: 113 MG/DL (ref 70–105)
GLUCOSE BLDC GLUCOMTR-MCNC: 115 MG/DL (ref 70–105)
GLUCOSE BLDC GLUCOMTR-MCNC: 167 MG/DL (ref 70–105)
GLUCOSE FLD-MCNC: 111 MG/DL
GLUCOSE SERPL-MCNC: 100 MG/DL (ref 65–99)
GLUCOSE SERPL-MCNC: 100 MG/DL (ref 65–99)
GLUCOSE SERPL-MCNC: 101 MG/DL (ref 65–99)
GLUCOSE SERPL-MCNC: 123 MG/DL (ref 65–99)
GLUCOSE SERPL-MCNC: 160 MG/DL (ref 65–99)
GLUCOSE SERPL-MCNC: 166 MG/DL (ref 65–99)
GLUCOSE SERPL-MCNC: 168 MG/DL (ref 65–99)
GLUCOSE SERPL-MCNC: 169 MG/DL (ref 65–99)
GLUCOSE SERPL-MCNC: 211 MG/DL (ref 65–99)
GRAM STN SPEC: NORMAL
GRAM STN SPEC: NORMAL
HADV DNA SPEC NAA+PROBE: NOT DETECTED
HCO3 BLDA-SCNC: 24.6 MMOL/L (ref 21–28)
HCOV 229E RNA SPEC QL NAA+PROBE: NOT DETECTED
HCOV HKU1 RNA SPEC QL NAA+PROBE: NOT DETECTED
HCOV NL63 RNA SPEC QL NAA+PROBE: NOT DETECTED
HCOV OC43 RNA SPEC QL NAA+PROBE: NOT DETECTED
HCT VFR BLD AUTO: 44 % (ref 37.5–51)
HCT VFR BLD AUTO: 44 % (ref 37.5–51)
HCT VFR BLD AUTO: 44.4 % (ref 37.5–51)
HCT VFR BLD AUTO: 44.4 % (ref 37.5–51)
HCT VFR BLD AUTO: 44.6 % (ref 37.5–51)
HCT VFR BLD AUTO: 46 % (ref 37.5–51)
HCT VFR BLD AUTO: 46.2 % (ref 37.5–51)
HCT VFR BLD AUTO: 47.5 % (ref 37.5–51)
HCT VFR BLD AUTO: 49.2 % (ref 37.5–51)
HCT VFR BLD AUTO: 49.9 % (ref 37.5–51)
HCT VFR BLD AUTO: 50.2 % (ref 37.5–51)
HEMODILUTION: NO
HGB BLD-MCNC: 13.9 G/DL (ref 13–17.7)
HGB BLD-MCNC: 14 G/DL (ref 13–17.7)
HGB BLD-MCNC: 14 G/DL (ref 13–17.7)
HGB BLD-MCNC: 14.1 G/DL (ref 13–17.7)
HGB BLD-MCNC: 14.5 G/DL (ref 13–17.7)
HGB BLD-MCNC: 14.6 G/DL (ref 13–17.7)
HGB BLD-MCNC: 14.7 G/DL (ref 13–17.7)
HGB BLD-MCNC: 15.2 G/DL (ref 13–17.7)
HGB BLD-MCNC: 15.4 G/DL (ref 13–17.7)
HGB BLD-MCNC: 16.1 G/DL (ref 13–17.7)
HGB BLD-MCNC: 16.3 G/DL (ref 13–17.7)
HMPV RNA NPH QL NAA+NON-PROBE: NOT DETECTED
HOLD SPECIMEN: NORMAL
HPIV1 RNA ISLT QL NAA+PROBE: NOT DETECTED
HPIV2 RNA SPEC QL NAA+PROBE: NOT DETECTED
HPIV3 RNA NPH QL NAA+PROBE: NOT DETECTED
HPIV4 P GENE NPH QL NAA+PROBE: NOT DETECTED
INHALED O2 CONCENTRATION: 48 %
INR PPP: 1.05 (ref 0.93–1.1)
LAB AP CASE REPORT: NORMAL
LAB AP DIAGNOSIS COMMENT: NORMAL
LDH FLD-CCNC: 106 U/L
LYMPHOCYTES # BLD AUTO: 0.54 10*3/MM3 (ref 0.7–3.1)
LYMPHOCYTES # BLD AUTO: 0.58 10*3/MM3 (ref 0.7–3.1)
LYMPHOCYTES # BLD AUTO: 0.65 10*3/MM3 (ref 0.7–3.1)
LYMPHOCYTES # BLD AUTO: 0.65 10*3/MM3 (ref 0.7–3.1)
LYMPHOCYTES # BLD AUTO: 0.68 10*3/MM3 (ref 0.7–3.1)
LYMPHOCYTES # BLD AUTO: 0.7 10*3/MM3 (ref 0.7–3.1)
LYMPHOCYTES # BLD AUTO: 0.7 10*3/MM3 (ref 0.7–3.1)
LYMPHOCYTES # BLD AUTO: 0.76 10*3/MM3 (ref 0.7–3.1)
LYMPHOCYTES # BLD AUTO: 0.76 10*3/MM3 (ref 0.7–3.1)
LYMPHOCYTES # BLD AUTO: 0.8 10*3/MM3 (ref 0.7–3.1)
LYMPHOCYTES # BLD AUTO: 0.8 10*3/MM3 (ref 0.7–3.1)
LYMPHOCYTES NFR BLD AUTO: 10.2 % (ref 19.6–45.3)
LYMPHOCYTES NFR BLD AUTO: 10.6 % (ref 19.6–45.3)
LYMPHOCYTES NFR BLD AUTO: 10.9 % (ref 19.6–45.3)
LYMPHOCYTES NFR BLD AUTO: 11 % (ref 19.6–45.3)
LYMPHOCYTES NFR BLD AUTO: 11.9 % (ref 19.6–45.3)
LYMPHOCYTES NFR BLD AUTO: 12.1 % (ref 19.6–45.3)
LYMPHOCYTES NFR BLD AUTO: 14 % (ref 19.6–45.3)
LYMPHOCYTES NFR BLD AUTO: 17.5 % (ref 19.6–45.3)
LYMPHOCYTES NFR BLD AUTO: 8.3 % (ref 19.6–45.3)
LYMPHOCYTES NFR BLD AUTO: 8.9 % (ref 19.6–45.3)
LYMPHOCYTES NFR BLD AUTO: 9.6 % (ref 19.6–45.3)
LYMPHOCYTES NFR FLD MANUAL: 74 %
Lab: NORMAL
M PNEUMO IGG SER IA-ACNC: NOT DETECTED
MAGNESIUM SERPL-MCNC: 2.2 MG/DL (ref 1.6–2.4)
MCH RBC QN AUTO: 27.6 PG (ref 26.6–33)
MCH RBC QN AUTO: 27.6 PG (ref 26.6–33)
MCH RBC QN AUTO: 28 PG (ref 26.6–33)
MCH RBC QN AUTO: 28.1 PG (ref 26.6–33)
MCH RBC QN AUTO: 28.2 PG (ref 26.6–33)
MCH RBC QN AUTO: 28.3 PG (ref 26.6–33)
MCH RBC QN AUTO: 28.3 PG (ref 26.6–33)
MCH RBC QN AUTO: 28.7 PG (ref 26.6–33)
MCHC RBC AUTO-ENTMCNC: 31.3 G/DL (ref 31.5–35.7)
MCHC RBC AUTO-ENTMCNC: 31.4 G/DL (ref 31.5–35.7)
MCHC RBC AUTO-ENTMCNC: 31.5 G/DL (ref 31.5–35.7)
MCHC RBC AUTO-ENTMCNC: 31.6 G/DL (ref 31.5–35.7)
MCHC RBC AUTO-ENTMCNC: 31.8 G/DL (ref 31.5–35.7)
MCHC RBC AUTO-ENTMCNC: 32 G/DL (ref 31.5–35.7)
MCHC RBC AUTO-ENTMCNC: 32.2 G/DL (ref 31.5–35.7)
MCHC RBC AUTO-ENTMCNC: 32.5 G/DL (ref 31.5–35.7)
MCHC RBC AUTO-ENTMCNC: 32.8 G/DL (ref 31.5–35.7)
MCV RBC AUTO: 84.3 FL (ref 79–97)
MCV RBC AUTO: 86 FL (ref 79–97)
MCV RBC AUTO: 86.4 FL (ref 79–97)
MCV RBC AUTO: 87.3 FL (ref 79–97)
MCV RBC AUTO: 88.3 FL (ref 79–97)
MCV RBC AUTO: 88.5 FL (ref 79–97)
MCV RBC AUTO: 88.9 FL (ref 79–97)
MCV RBC AUTO: 89.4 FL (ref 79–97)
MCV RBC AUTO: 89.8 FL (ref 79–97)
MCV RBC AUTO: 90.4 FL (ref 79–97)
MCV RBC AUTO: 90.4 FL (ref 79–97)
MESOTHL CELL NFR FLD MANUAL: 2 %
MODALITY: ABNORMAL
MONOCYTES # BLD AUTO: 0.46 10*3/MM3 (ref 0.1–0.9)
MONOCYTES # BLD AUTO: 0.54 10*3/MM3 (ref 0.1–0.9)
MONOCYTES # BLD AUTO: 0.55 10*3/MM3 (ref 0.1–0.9)
MONOCYTES # BLD AUTO: 0.6 10*3/MM3 (ref 0.1–0.9)
MONOCYTES # BLD AUTO: 0.62 10*3/MM3 (ref 0.1–0.9)
MONOCYTES # BLD AUTO: 0.64 10*3/MM3 (ref 0.1–0.9)
MONOCYTES # BLD AUTO: 0.68 10*3/MM3 (ref 0.1–0.9)
MONOCYTES # BLD AUTO: 0.8 10*3/MM3 (ref 0.1–0.9)
MONOCYTES # BLD AUTO: 0.87 10*3/MM3 (ref 0.1–0.9)
MONOCYTES # BLD AUTO: 0.9 10*3/MM3 (ref 0.1–0.9)
MONOCYTES # BLD AUTO: 1 10*3/MM3 (ref 0.1–0.9)
MONOCYTES NFR BLD AUTO: 10 % (ref 5–12)
MONOCYTES NFR BLD AUTO: 10.6 % (ref 5–12)
MONOCYTES NFR BLD AUTO: 10.7 % (ref 5–12)
MONOCYTES NFR BLD AUTO: 11.3 % (ref 5–12)
MONOCYTES NFR BLD AUTO: 11.5 % (ref 5–12)
MONOCYTES NFR BLD AUTO: 12.2 % (ref 5–12)
MONOCYTES NFR BLD AUTO: 12.9 % (ref 5–12)
MONOCYTES NFR BLD AUTO: 13 % (ref 5–12)
MONOCYTES NFR BLD AUTO: 7.5 % (ref 5–12)
MONOCYTES NFR FLD: 3 %
MYCOBACTERIUM SPEC CULT: NORMAL
NEUTROPHILS NFR BLD AUTO: 2.93 10*3/MM3 (ref 1.7–7)
NEUTROPHILS NFR BLD AUTO: 3.49 10*3/MM3 (ref 1.7–7)
NEUTROPHILS NFR BLD AUTO: 3.64 10*3/MM3 (ref 1.7–7)
NEUTROPHILS NFR BLD AUTO: 3.84 10*3/MM3 (ref 1.7–7)
NEUTROPHILS NFR BLD AUTO: 4.07 10*3/MM3 (ref 1.7–7)
NEUTROPHILS NFR BLD AUTO: 4.9 10*3/MM3 (ref 1.7–7)
NEUTROPHILS NFR BLD AUTO: 5.2 10*3/MM3 (ref 1.7–7)
NEUTROPHILS NFR BLD AUTO: 5.8 10*3/MM3 (ref 1.7–7)
NEUTROPHILS NFR BLD AUTO: 6.4 10*3/MM3 (ref 1.7–7)
NEUTROPHILS NFR BLD AUTO: 6.48 10*3/MM3 (ref 1.7–7)
NEUTROPHILS NFR BLD AUTO: 6.5 10*3/MM3 (ref 1.7–7)
NEUTROPHILS NFR BLD AUTO: 67.3 % (ref 42.7–76)
NEUTROPHILS NFR BLD AUTO: 70.9 % (ref 42.7–76)
NEUTROPHILS NFR BLD AUTO: 72.9 % (ref 42.7–76)
NEUTROPHILS NFR BLD AUTO: 73.4 % (ref 42.7–76)
NEUTROPHILS NFR BLD AUTO: 74.3 % (ref 42.7–76)
NEUTROPHILS NFR BLD AUTO: 75.4 % (ref 42.7–76)
NEUTROPHILS NFR BLD AUTO: 76.2 % (ref 42.7–76)
NEUTROPHILS NFR BLD AUTO: 77.1 % (ref 42.7–76)
NEUTROPHILS NFR BLD AUTO: 78.1 % (ref 42.7–76)
NEUTROPHILS NFR BLD AUTO: 79.5 % (ref 42.7–76)
NEUTROPHILS NFR BLD AUTO: 80.1 % (ref 42.7–76)
NEUTROPHILS NFR FLD MANUAL: 21 %
NIGHT BLUE STAIN TISS: NORMAL
NRBC BLD AUTO-RTO: 0 /100 WBC (ref 0–0.2)
NRBC BLD AUTO-RTO: 0.1 /100 WBC (ref 0–0.2)
NT-PROBNP SERPL-MCNC: 296.9 PG/ML (ref 0–1800)
NUC CELL # FLD: 1041 /MM3
PATH REPORT.ADDENDUM SPEC: NORMAL
PATH REPORT.FINAL DX SPEC: NORMAL
PATH REPORT.GROSS SPEC: NORMAL
PCO2 BLDA: 42 MM HG (ref 35–48)
PH BLDA: 7.38 PH UNITS (ref 7.35–7.45)
PH FLD: 7.5 [PH] (ref 6.5–7.5)
PHOSPHATE SERPL-MCNC: 2.7 MG/DL (ref 2.5–4.5)
PLATELET # BLD AUTO: 132 10*3/MM3 (ref 140–450)
PLATELET # BLD AUTO: 135 10*3/MM3 (ref 140–450)
PLATELET # BLD AUTO: 135 10*3/MM3 (ref 140–450)
PLATELET # BLD AUTO: 153 10*3/MM3 (ref 140–450)
PLATELET # BLD AUTO: 166 10*3/MM3 (ref 140–450)
PLATELET # BLD AUTO: 167 10*3/MM3 (ref 140–450)
PLATELET # BLD AUTO: 177 10*3/MM3 (ref 140–450)
PLATELET # BLD AUTO: 180 10*3/MM3 (ref 140–450)
PLATELET # BLD AUTO: 182 10*3/MM3 (ref 140–450)
PLATELET # BLD AUTO: 185 10*3/MM3 (ref 140–450)
PLATELET # BLD AUTO: 192 10*3/MM3 (ref 140–450)
PMV BLD AUTO: 10.2 FL (ref 6–12)
PMV BLD AUTO: 10.2 FL (ref 6–12)
PMV BLD AUTO: 10.3 FL (ref 6–12)
PMV BLD AUTO: 10.4 FL (ref 6–12)
PMV BLD AUTO: 8.4 FL (ref 6–12)
PMV BLD AUTO: 8.5 FL (ref 6–12)
PMV BLD AUTO: 8.8 FL (ref 6–12)
PMV BLD AUTO: 8.9 FL (ref 6–12)
PMV BLD AUTO: 9.5 FL (ref 6–12)
PMV BLD AUTO: 9.9 FL (ref 6–12)
PMV BLD AUTO: 9.9 FL (ref 6–12)
PO2 BLDA: 73.4 MM HG (ref 83–108)
POTASSIUM SERPL-SCNC: 3.5 MMOL/L (ref 3.5–5.2)
POTASSIUM SERPL-SCNC: 3.6 MMOL/L (ref 3.5–5.2)
POTASSIUM SERPL-SCNC: 3.9 MMOL/L (ref 3.5–5.2)
POTASSIUM SERPL-SCNC: 4 MMOL/L (ref 3.5–5.2)
POTASSIUM SERPL-SCNC: 4.1 MMOL/L (ref 3.5–5.2)
POTASSIUM SERPL-SCNC: 4.7 MMOL/L (ref 3.5–5.2)
POTASSIUM SERPL-SCNC: 5 MMOL/L (ref 3.5–5.2)
PROT FLD-MCNC: 3.7 G/DL
PROT SERPL-MCNC: 6.3 G/DL (ref 6–8.5)
PROT SERPL-MCNC: 6.4 G/DL (ref 6–8.5)
PROT SERPL-MCNC: 6.4 G/DL (ref 6–8.5)
PROT SERPL-MCNC: 6.6 G/DL (ref 6–8.5)
PROT SERPL-MCNC: 6.7 G/DL (ref 6–8.5)
PROT SERPL-MCNC: 6.8 G/DL (ref 6–8.5)
PROT SERPL-MCNC: 6.9 G/DL (ref 6–8.5)
PROT SERPL-MCNC: 7.1 G/DL (ref 6–8.5)
PROTHROMBIN TIME: 10.8 SECONDS (ref 9.6–11.7)
QT INTERVAL: 364 MS
RAD ONC ARIA COURSE END DATE: NORMAL
RAD ONC ARIA COURSE ID: NORMAL
RAD ONC ARIA COURSE LAST TREATMENT DATE: NORMAL
RAD ONC ARIA COURSE START DATE: NORMAL
RAD ONC ARIA COURSE TREATMENT ELAPSED DAYS: 0
RAD ONC ARIA COURSE TREATMENT ELAPSED DAYS: 1
RAD ONC ARIA COURSE TREATMENT ELAPSED DAYS: 2
RAD ONC ARIA COURSE TREATMENT ELAPSED DAYS: 3
RAD ONC ARIA COURSE TREATMENT ELAPSED DAYS: 4
RAD ONC ARIA COURSE TREATMENT ELAPSED DAYS: 4
RAD ONC ARIA FIRST TREATMENT DATE: NORMAL
RAD ONC ARIA PLAN FRACTIONS TREATED TO DATE: 1
RAD ONC ARIA PLAN FRACTIONS TREATED TO DATE: 2
RAD ONC ARIA PLAN FRACTIONS TREATED TO DATE: 3
RAD ONC ARIA PLAN FRACTIONS TREATED TO DATE: 4
RAD ONC ARIA PLAN FRACTIONS TREATED TO DATE: 5
RAD ONC ARIA PLAN FRACTIONS TREATED TO DATE: 5
RAD ONC ARIA PLAN ID: NORMAL
RAD ONC ARIA PLAN NAME: NORMAL
RAD ONC ARIA PLAN PRESCRIBED DOSE PER FRACTION: 4 GY
RAD ONC ARIA PLAN PRIMARY REFERENCE POINT: NORMAL
RAD ONC ARIA PLAN TOTAL FRACTIONS PRESCRIBED: 5
RAD ONC ARIA PLAN TOTAL PRESCRIBED DOSE: 2000 CGY
RAD ONC ARIA REFERENCE POINT DOSAGE GIVEN TO DATE: 12 GY
RAD ONC ARIA REFERENCE POINT DOSAGE GIVEN TO DATE: 16 GY
RAD ONC ARIA REFERENCE POINT DOSAGE GIVEN TO DATE: 20 GY
RAD ONC ARIA REFERENCE POINT DOSAGE GIVEN TO DATE: 20 GY
RAD ONC ARIA REFERENCE POINT DOSAGE GIVEN TO DATE: 4 GY
RAD ONC ARIA REFERENCE POINT DOSAGE GIVEN TO DATE: 8 GY
RAD ONC ARIA REFERENCE POINT ID: NORMAL
RAD ONC ARIA REFERENCE POINT SESSION DOSAGE GIVEN: 4 GY
RBC # BLD AUTO: 4.91 10*6/MM3 (ref 4.14–5.8)
RBC # BLD AUTO: 4.95 10*6/MM3 (ref 4.14–5.8)
RBC # BLD AUTO: 4.97 10*6/MM3 (ref 4.14–5.8)
RBC # BLD AUTO: 4.99 10*6/MM3 (ref 4.14–5.8)
RBC # BLD AUTO: 5.12 10*6/MM3 (ref 4.14–5.8)
RBC # BLD AUTO: 5.23 10*6/MM3 (ref 4.14–5.8)
RBC # BLD AUTO: 5.27 10*6/MM3 (ref 4.14–5.8)
RBC # BLD AUTO: 5.44 10*6/MM3 (ref 4.14–5.8)
RBC # BLD AUTO: 5.5 10*6/MM3 (ref 4.14–5.8)
RBC # BLD AUTO: 5.71 10*6/MM3 (ref 4.14–5.8)
RBC # BLD AUTO: 5.83 10*6/MM3 (ref 4.14–5.8)
RHINOVIRUS RNA SPEC NAA+PROBE: NOT DETECTED
RSV RNA NPH QL NAA+NON-PROBE: NOT DETECTED
SAO2 % BLDCOA: 94.1 % (ref 94–98)
SARS-COV-2 RNA NPH QL NAA+NON-PROBE: NOT DETECTED
SODIUM SERPL-SCNC: 131 MMOL/L (ref 136–145)
SODIUM SERPL-SCNC: 133 MMOL/L (ref 136–145)
SODIUM SERPL-SCNC: 137 MMOL/L (ref 136–145)
SODIUM SERPL-SCNC: 137 MMOL/L (ref 136–145)
SODIUM SERPL-SCNC: 138 MMOL/L (ref 136–145)
SODIUM SERPL-SCNC: 139 MMOL/L (ref 136–145)
SODIUM SERPL-SCNC: 141 MMOL/L (ref 136–145)
T4 FREE SERPL-MCNC: 1.05 NG/DL (ref 0.93–1.7)
T4 FREE SERPL-MCNC: 1.13 NG/DL (ref 0.93–1.7)
TRIGL FLD-MCNC: 35 MG/DL
TROPONIN T SERPL-MCNC: <0.01 NG/ML (ref 0–0.03)
TSH SERPL DL<=0.05 MIU/L-ACNC: 1.22 UIU/ML (ref 0.27–4.2)
TSH SERPL DL<=0.05 MIU/L-ACNC: 1.48 UIU/ML (ref 0.27–4.2)
WBC NRBC COR # BLD: 4.35 10*3/MM3 (ref 3.4–10.8)
WBC NRBC COR # BLD: 4.79 10*3/MM3 (ref 3.4–10.8)
WBC NRBC COR # BLD: 4.96 10*3/MM3 (ref 3.4–10.8)
WBC NRBC COR # BLD: 5.41 10*3/MM3 (ref 3.4–10.8)
WBC NRBC COR # BLD: 5.48 10*3/MM3 (ref 3.4–10.8)
WBC NRBC COR # BLD: 6.36 10*3/MM3 (ref 3.4–10.8)
WBC NRBC COR # BLD: 6.8 10*3/MM3 (ref 3.4–10.8)
WBC NRBC COR # BLD: 7.6 10*3/MM3 (ref 3.4–10.8)
WBC NRBC COR # BLD: 7.9 10*3/MM3 (ref 3.4–10.8)
WBC NRBC COR # BLD: 8.15 10*3/MM3 (ref 3.4–10.8)
WBC NRBC COR # BLD: 8.4 10*3/MM3 (ref 3.4–10.8)
WHOLE BLOOD HOLD COAG: NORMAL

## 2022-01-01 PROCEDURE — 99232 SBSQ HOSP IP/OBS MODERATE 35: CPT | Performed by: INTERNAL MEDICINE

## 2022-01-01 PROCEDURE — 94799 UNLISTED PULMONARY SVC/PX: CPT

## 2022-01-01 PROCEDURE — 0 LIDOCAINE 1 % SOLUTION: Performed by: RADIOLOGY

## 2022-01-01 PROCEDURE — 77295 3-D RADIOTHERAPY PLAN: CPT | Performed by: RADIOLOGY

## 2022-01-01 PROCEDURE — 83986 ASSAY PH BODY FLUID NOS: CPT

## 2022-01-01 PROCEDURE — A9579 GAD-BASE MR CONTRAST NOS,1ML: HCPCS | Performed by: INTERNAL MEDICINE

## 2022-01-01 PROCEDURE — 80053 COMPREHEN METABOLIC PANEL: CPT | Performed by: INTERNAL MEDICINE

## 2022-01-01 PROCEDURE — 77336 RADIATION PHYSICS CONSULT: CPT | Performed by: RADIOLOGY

## 2022-01-01 PROCEDURE — 25010000002 ENOXAPARIN PER 10 MG: Performed by: NURSE PRACTITIONER

## 2022-01-01 PROCEDURE — 96360 HYDRATION IV INFUSION INIT: CPT

## 2022-01-01 PROCEDURE — 71045 X-RAY EXAM CHEST 1 VIEW: CPT

## 2022-01-01 PROCEDURE — 71260 CT THORAX DX C+: CPT

## 2022-01-01 PROCEDURE — 97116 GAIT TRAINING THERAPY: CPT

## 2022-01-01 PROCEDURE — 25010000002 AZITHROMYCIN PER 500 MG: Performed by: NURSE PRACTITIONER

## 2022-01-01 PROCEDURE — 77334 RADIATION TREATMENT AID(S): CPT | Performed by: RADIOLOGY

## 2022-01-01 PROCEDURE — 88305 TISSUE EXAM BY PATHOLOGIST: CPT | Performed by: RADIOLOGY

## 2022-01-01 PROCEDURE — 88108 CYTOPATH CONCENTRATE TECH: CPT

## 2022-01-01 PROCEDURE — 84100 ASSAY OF PHOSPHORUS: CPT | Performed by: INTERNAL MEDICINE

## 2022-01-01 PROCEDURE — 94664 DEMO&/EVAL PT USE INHALER: CPT

## 2022-01-01 PROCEDURE — 93005 ELECTROCARDIOGRAM TRACING: CPT | Performed by: NURSE PRACTITIONER

## 2022-01-01 PROCEDURE — 85025 COMPLETE CBC W/AUTO DIFF WBC: CPT

## 2022-01-01 PROCEDURE — 85025 COMPLETE CBC W/AUTO DIFF WBC: CPT | Performed by: NURSE PRACTITIONER

## 2022-01-01 PROCEDURE — 97530 THERAPEUTIC ACTIVITIES: CPT

## 2022-01-01 PROCEDURE — 82962 GLUCOSE BLOOD TEST: CPT

## 2022-01-01 PROCEDURE — 0 IOPAMIDOL PER 1 ML: Performed by: INTERNAL MEDICINE

## 2022-01-01 PROCEDURE — 99214 OFFICE O/P EST MOD 30 MIN: CPT | Performed by: INTERNAL MEDICINE

## 2022-01-01 PROCEDURE — 82565 ASSAY OF CREATININE: CPT

## 2022-01-01 PROCEDURE — 96413 CHEMO IV INFUSION 1 HR: CPT

## 2022-01-01 PROCEDURE — 82945 GLUCOSE OTHER FLUID: CPT

## 2022-01-01 PROCEDURE — 0BBC3ZX EXCISION OF RIGHT UPPER LUNG LOBE, PERCUTANEOUS APPROACH, DIAGNOSTIC: ICD-10-PCS | Performed by: EMERGENCY MEDICINE

## 2022-01-01 PROCEDURE — 99223 1ST HOSP IP/OBS HIGH 75: CPT | Performed by: NURSE PRACTITIONER

## 2022-01-01 PROCEDURE — 77412 RADIATION TX DELIVERY LVL 3: CPT | Performed by: RADIOLOGY

## 2022-01-01 PROCEDURE — 36415 COLL VENOUS BLD VENIPUNCTURE: CPT

## 2022-01-01 PROCEDURE — 77263 THER RADIOLOGY TX PLNG CPLX: CPT | Performed by: RADIOLOGY

## 2022-01-01 PROCEDURE — 25010000002 PEMBROLIZUMAB 100 MG/4ML SOLUTION 4 ML VIAL: Performed by: INTERNAL MEDICINE

## 2022-01-01 PROCEDURE — 84484 ASSAY OF TROPONIN QUANT: CPT | Performed by: NURSE PRACTITIONER

## 2022-01-01 PROCEDURE — 83615 LACTATE (LD) (LDH) ENZYME: CPT

## 2022-01-01 PROCEDURE — 87206 SMEAR FLUORESCENT/ACID STAI: CPT

## 2022-01-01 PROCEDURE — 83605 ASSAY OF LACTIC ACID: CPT

## 2022-01-01 PROCEDURE — 99233 SBSQ HOSP IP/OBS HIGH 50: CPT | Performed by: INTERNAL MEDICINE

## 2022-01-01 PROCEDURE — 77300 RADIATION THERAPY DOSE PLAN: CPT | Performed by: RADIOLOGY

## 2022-01-01 PROCEDURE — 0W9930Z DRAINAGE OF RIGHT PLEURAL CAVITY WITH DRAINAGE DEVICE, PERCUTANEOUS APPROACH: ICD-10-PCS | Performed by: INTERNAL MEDICINE

## 2022-01-01 PROCEDURE — FACE2FACE: Performed by: RADIOLOGY

## 2022-01-01 PROCEDURE — 85025 COMPLETE CBC W/AUTO DIFF WBC: CPT | Performed by: INTERNAL MEDICINE

## 2022-01-01 PROCEDURE — 83880 ASSAY OF NATRIURETIC PEPTIDE: CPT | Performed by: NURSE PRACTITIONER

## 2022-01-01 PROCEDURE — 77280 THER RAD SIMULAJ FIELD SMPL: CPT | Performed by: RADIOLOGY

## 2022-01-01 PROCEDURE — 80053 COMPREHEN METABOLIC PANEL: CPT | Performed by: NURSE PRACTITIONER

## 2022-01-01 PROCEDURE — 84439 ASSAY OF FREE THYROXINE: CPT | Performed by: INTERNAL MEDICINE

## 2022-01-01 PROCEDURE — 25010000002 FENTANYL CITRATE (PF) 50 MCG/ML SOLUTION: Performed by: RADIOLOGY

## 2022-01-01 PROCEDURE — 94618 PULMONARY STRESS TESTING: CPT

## 2022-01-01 PROCEDURE — 82803 BLOOD GASES ANY COMBINATION: CPT

## 2022-01-01 PROCEDURE — 99024 POSTOP FOLLOW-UP VISIT: CPT | Performed by: RADIOLOGY

## 2022-01-01 PROCEDURE — 87205 SMEAR GRAM STAIN: CPT

## 2022-01-01 PROCEDURE — 88342 IMHCHEM/IMCYTCHM 1ST ANTB: CPT | Performed by: RADIOLOGY

## 2022-01-01 PROCEDURE — 25010000002 CEFTRIAXONE PER 250 MG: Performed by: NURSE PRACTITIONER

## 2022-01-01 PROCEDURE — 76942 ECHO GUIDE FOR BIOPSY: CPT

## 2022-01-01 PROCEDURE — 84443 ASSAY THYROID STIM HORMONE: CPT | Performed by: INTERNAL MEDICINE

## 2022-01-01 PROCEDURE — 99285 EMERGENCY DEPT VISIT HI MDM: CPT

## 2022-01-01 PROCEDURE — 87116 MYCOBACTERIA CULTURE: CPT

## 2022-01-01 PROCEDURE — C1729 CATH, DRAINAGE: HCPCS

## 2022-01-01 PROCEDURE — 25010000002 GADOTERIDOL PER 1 ML: Performed by: INTERNAL MEDICINE

## 2022-01-01 PROCEDURE — 85610 PROTHROMBIN TIME: CPT

## 2022-01-01 PROCEDURE — 94640 AIRWAY INHALATION TREATMENT: CPT

## 2022-01-01 PROCEDURE — 97161 PT EVAL LOW COMPLEX 20 MIN: CPT

## 2022-01-01 PROCEDURE — 82042 OTHER SOURCE ALBUMIN QUAN EA: CPT

## 2022-01-01 PROCEDURE — 70553 MRI BRAIN STEM W/O & W/DYE: CPT

## 2022-01-01 PROCEDURE — 36600 WITHDRAWAL OF ARTERIAL BLOOD: CPT

## 2022-01-01 PROCEDURE — 80053 COMPREHEN METABOLIC PANEL: CPT

## 2022-01-01 PROCEDURE — 88341 IMHCHEM/IMCYTCHM EA ADD ANTB: CPT | Performed by: RADIOLOGY

## 2022-01-01 PROCEDURE — 87070 CULTURE OTHR SPECIMN AEROBIC: CPT

## 2022-01-01 PROCEDURE — 87040 BLOOD CULTURE FOR BACTERIA: CPT | Performed by: NURSE PRACTITIONER

## 2022-01-01 PROCEDURE — 0 IOPAMIDOL PER 1 ML: Performed by: SURGERY

## 2022-01-01 PROCEDURE — 99223 1ST HOSP IP/OBS HIGH 75: CPT | Performed by: RADIOLOGY

## 2022-01-01 PROCEDURE — 88333 PATH CONSLTJ SURG CYTO XM 1: CPT | Performed by: RADIOLOGY

## 2022-01-01 PROCEDURE — 77290 THER RAD SIMULAJ FIELD CPLX: CPT | Performed by: RADIOLOGY

## 2022-01-01 PROCEDURE — 25010000002 MIDAZOLAM PER 1 MG: Performed by: RADIOLOGY

## 2022-01-01 PROCEDURE — 71046 X-RAY EXAM CHEST 2 VIEWS: CPT

## 2022-01-01 PROCEDURE — 88334 PATH CONSLTJ SURG CYTO XM EA: CPT | Performed by: RADIOLOGY

## 2022-01-01 PROCEDURE — 0202U NFCT DS 22 TRGT SARS-COV-2: CPT | Performed by: NURSE PRACTITIONER

## 2022-01-01 PROCEDURE — 87075 CULTR BACTERIA EXCEPT BLOOD: CPT

## 2022-01-01 PROCEDURE — 99222 1ST HOSP IP/OBS MODERATE 55: CPT | Performed by: INTERNAL MEDICINE

## 2022-01-01 PROCEDURE — 74177 CT ABD & PELVIS W/CONTRAST: CPT

## 2022-01-01 PROCEDURE — 99215 OFFICE O/P EST HI 40 MIN: CPT | Performed by: NURSE PRACTITIONER

## 2022-01-01 PROCEDURE — 83735 ASSAY OF MAGNESIUM: CPT | Performed by: INTERNAL MEDICINE

## 2022-01-01 PROCEDURE — 84478 ASSAY OF TRIGLYCERIDES: CPT

## 2022-01-01 PROCEDURE — 36415 COLL VENOUS BLD VENIPUNCTURE: CPT | Performed by: NURSE PRACTITIONER

## 2022-01-01 PROCEDURE — 84157 ASSAY OF PROTEIN OTHER: CPT

## 2022-01-01 PROCEDURE — G0463 HOSPITAL OUTPT CLINIC VISIT: HCPCS | Performed by: RADIOLOGY

## 2022-01-01 PROCEDURE — 99215 OFFICE O/P EST HI 40 MIN: CPT | Performed by: INTERNAL MEDICINE

## 2022-01-01 PROCEDURE — 89051 BODY FLUID CELL COUNT: CPT

## 2022-01-01 PROCEDURE — 94761 N-INVAS EAR/PLS OXIMETRY MLT: CPT

## 2022-01-01 PROCEDURE — 84311 SPECTROPHOTOMETRY: CPT

## 2022-01-01 PROCEDURE — 25010000002 ONDANSETRON PER 1 MG: Performed by: RADIOLOGY

## 2022-01-01 PROCEDURE — 99152 MOD SED SAME PHYS/QHP 5/>YRS: CPT

## 2022-01-01 PROCEDURE — 36415 COLL VENOUS BLD VENIPUNCTURE: CPT | Performed by: INTERNAL MEDICINE

## 2022-01-01 RX ORDER — SODIUM CHLORIDE 9 MG/ML
250 INJECTION, SOLUTION INTRAVENOUS ONCE
Status: COMPLETED | OUTPATIENT
Start: 2022-01-01 | End: 2022-01-01

## 2022-01-01 RX ORDER — SODIUM CHLORIDE 9 MG/ML
250 INJECTION, SOLUTION INTRAVENOUS ONCE
Status: CANCELLED | OUTPATIENT
Start: 2022-01-01

## 2022-01-01 RX ORDER — SODIUM CHLORIDE 9 MG/ML
250 INJECTION, SOLUTION INTRAVENOUS ONCE
Status: DISCONTINUED | OUTPATIENT
Start: 2022-01-01 | End: 2022-01-01 | Stop reason: HOSPADM

## 2022-01-01 RX ORDER — MIDAZOLAM HYDROCHLORIDE 1 MG/ML
INJECTION INTRAMUSCULAR; INTRAVENOUS
Status: COMPLETED | OUTPATIENT
Start: 2022-01-01 | End: 2022-01-01

## 2022-01-01 RX ORDER — ONDANSETRON HYDROCHLORIDE 8 MG/1
8 TABLET, FILM COATED ORAL 3 TIMES DAILY PRN
Qty: 30 TABLET | Refills: 5 | Status: SHIPPED | OUTPATIENT
Start: 2022-01-01 | End: 2023-01-01

## 2022-01-01 RX ORDER — SODIUM CHLORIDE 0.9 % (FLUSH) 0.9 %
10 SYRINGE (ML) INJECTION EVERY 12 HOURS SCHEDULED
Status: DISCONTINUED | OUTPATIENT
Start: 2022-01-01 | End: 2022-01-01 | Stop reason: HOSPADM

## 2022-01-01 RX ORDER — ONDANSETRON 2 MG/ML
INJECTION INTRAMUSCULAR; INTRAVENOUS
Status: COMPLETED | OUTPATIENT
Start: 2022-01-01 | End: 2022-01-01

## 2022-01-01 RX ORDER — ACETAMINOPHEN 325 MG/1
650 TABLET ORAL EVERY 4 HOURS PRN
Status: DISCONTINUED | OUTPATIENT
Start: 2022-01-01 | End: 2022-01-01 | Stop reason: HOSPADM

## 2022-01-01 RX ORDER — POLYETHYLENE GLYCOL 3350 17 G/17G
17 POWDER, FOR SOLUTION ORAL DAILY
Qty: 24 EACH | Refills: 1 | Status: SHIPPED | OUTPATIENT
Start: 2022-01-01 | End: 2023-01-01

## 2022-01-01 RX ORDER — IPRATROPIUM BROMIDE AND ALBUTEROL SULFATE 2.5; .5 MG/3ML; MG/3ML
3 SOLUTION RESPIRATORY (INHALATION)
Status: DISCONTINUED | OUTPATIENT
Start: 2022-01-01 | End: 2022-01-01

## 2022-01-01 RX ORDER — ENOXAPARIN SODIUM 100 MG/ML
40 INJECTION SUBCUTANEOUS
Status: DISCONTINUED | OUTPATIENT
Start: 2022-01-01 | End: 2022-01-01

## 2022-01-01 RX ORDER — LIDOCAINE HYDROCHLORIDE 10 MG/ML
INJECTION, SOLUTION INFILTRATION; PERINEURAL AS NEEDED
Status: COMPLETED | OUTPATIENT
Start: 2022-01-01 | End: 2022-01-01

## 2022-01-01 RX ORDER — LIDOCAINE HYDROCHLORIDE 10 MG/ML
INJECTION, SOLUTION INFILTRATION; PERINEURAL
Status: COMPLETED | OUTPATIENT
Start: 2022-01-01 | End: 2022-01-01

## 2022-01-01 RX ORDER — BUDESONIDE 0.5 MG/2ML
0.5 INHALANT ORAL
Status: DISCONTINUED | OUTPATIENT
Start: 2022-01-01 | End: 2022-01-01 | Stop reason: HOSPADM

## 2022-01-01 RX ORDER — IPRATROPIUM BROMIDE AND ALBUTEROL SULFATE 2.5; .5 MG/3ML; MG/3ML
3 SOLUTION RESPIRATORY (INHALATION) EVERY 4 HOURS PRN
Status: DISCONTINUED | OUTPATIENT
Start: 2022-01-01 | End: 2022-01-01 | Stop reason: HOSPADM

## 2022-01-01 RX ORDER — ASPIRIN 81 MG/1
81 TABLET, CHEWABLE ORAL DAILY
Status: DISCONTINUED | OUTPATIENT
Start: 2022-01-01 | End: 2022-01-01 | Stop reason: HOSPADM

## 2022-01-01 RX ORDER — AZITHROMYCIN 250 MG/1
500 TABLET, FILM COATED ORAL
Status: COMPLETED | OUTPATIENT
Start: 2022-01-01 | End: 2022-01-01

## 2022-01-01 RX ORDER — ONDANSETRON 4 MG/1
4 TABLET, FILM COATED ORAL EVERY 6 HOURS PRN
Status: DISCONTINUED | OUTPATIENT
Start: 2022-01-01 | End: 2022-01-01 | Stop reason: HOSPADM

## 2022-01-01 RX ORDER — ACETAMINOPHEN 650 MG/1
650 SUPPOSITORY RECTAL EVERY 4 HOURS PRN
Status: DISCONTINUED | OUTPATIENT
Start: 2022-01-01 | End: 2022-01-01 | Stop reason: HOSPADM

## 2022-01-01 RX ORDER — ACETAMINOPHEN 160 MG/5ML
650 SOLUTION ORAL EVERY 4 HOURS PRN
Status: DISCONTINUED | OUTPATIENT
Start: 2022-01-01 | End: 2022-01-01 | Stop reason: HOSPADM

## 2022-01-01 RX ORDER — SODIUM CHLORIDE 0.9 % (FLUSH) 0.9 %
10 SYRINGE (ML) INJECTION AS NEEDED
Status: DISCONTINUED | OUTPATIENT
Start: 2022-01-01 | End: 2022-01-01 | Stop reason: HOSPADM

## 2022-01-01 RX ORDER — ALUMINA, MAGNESIA, AND SIMETHICONE 2400; 2400; 240 MG/30ML; MG/30ML; MG/30ML
15 SUSPENSION ORAL EVERY 6 HOURS PRN
Status: DISCONTINUED | OUTPATIENT
Start: 2022-01-01 | End: 2022-01-01 | Stop reason: HOSPADM

## 2022-01-01 RX ORDER — FENTANYL CITRATE 50 UG/ML
INJECTION, SOLUTION INTRAMUSCULAR; INTRAVENOUS
Status: COMPLETED | OUTPATIENT
Start: 2022-01-01 | End: 2022-01-01

## 2022-01-01 RX ORDER — ONDANSETRON 2 MG/ML
4 INJECTION INTRAMUSCULAR; INTRAVENOUS EVERY 6 HOURS PRN
Status: DISCONTINUED | OUTPATIENT
Start: 2022-01-01 | End: 2022-01-01 | Stop reason: HOSPADM

## 2022-01-01 RX ORDER — TRAMADOL HYDROCHLORIDE 50 MG/1
25 TABLET ORAL EVERY 6 HOURS PRN
Qty: 60 TABLET | Refills: 0 | Status: SHIPPED | OUTPATIENT
Start: 2022-01-01 | End: 2022-01-01

## 2022-01-01 RX ORDER — IPRATROPIUM BROMIDE AND ALBUTEROL SULFATE 2.5; .5 MG/3ML; MG/3ML
3 SOLUTION RESPIRATORY (INHALATION)
Status: DISCONTINUED | OUTPATIENT
Start: 2022-01-01 | End: 2022-01-01 | Stop reason: HOSPADM

## 2022-01-01 RX ORDER — TRAMADOL HYDROCHLORIDE 50 MG/1
50 TABLET ORAL EVERY 6 HOURS PRN
Qty: 30 TABLET | Refills: 0 | Status: CANCELLED | OUTPATIENT
Start: 2022-01-01

## 2022-01-01 RX ORDER — TRAMADOL HYDROCHLORIDE 50 MG/1
50 TABLET ORAL 2 TIMES DAILY PRN
Qty: 60 TABLET | Refills: 0 | Status: SHIPPED | OUTPATIENT
Start: 2022-01-01 | End: 2022-01-01

## 2022-01-01 RX ADMIN — ENOXAPARIN SODIUM 40 MG: 100 INJECTION SUBCUTANEOUS at 19:33

## 2022-01-01 RX ADMIN — Medication 10 ML: at 21:58

## 2022-01-01 RX ADMIN — Medication 10 ML: at 20:09

## 2022-01-01 RX ADMIN — Medication 10 ML: at 08:03

## 2022-01-01 RX ADMIN — SODIUM CHLORIDE 200 MG: 9 INJECTION, SOLUTION INTRAVENOUS at 14:05

## 2022-01-01 RX ADMIN — SODIUM CHLORIDE 1000 ML: 9 INJECTION, SOLUTION INTRAVENOUS at 15:17

## 2022-01-01 RX ADMIN — ENOXAPARIN SODIUM 40 MG: 100 INJECTION SUBCUTANEOUS at 15:44

## 2022-01-01 RX ADMIN — BUDESONIDE 0.5 MG: 0.5 INHALANT RESPIRATORY (INHALATION) at 06:58

## 2022-01-01 RX ADMIN — IPRATROPIUM BROMIDE AND ALBUTEROL SULFATE 3 ML: .5; 3 SOLUTION RESPIRATORY (INHALATION) at 07:18

## 2022-01-01 RX ADMIN — SODIUM CHLORIDE 250 ML: 9 INJECTION, SOLUTION INTRAVENOUS at 14:14

## 2022-01-01 RX ADMIN — Medication 10 ML: at 21:11

## 2022-01-01 RX ADMIN — IOPAMIDOL 100 ML: 755 INJECTION, SOLUTION INTRAVENOUS at 13:42

## 2022-01-01 RX ADMIN — Medication 10 ML: at 08:58

## 2022-01-01 RX ADMIN — ENOXAPARIN SODIUM 40 MG: 100 INJECTION SUBCUTANEOUS at 16:13

## 2022-01-01 RX ADMIN — BUDESONIDE 0.5 MG: 0.5 INHALANT RESPIRATORY (INHALATION) at 19:28

## 2022-01-01 RX ADMIN — IPRATROPIUM BROMIDE AND ALBUTEROL SULFATE 3 ML: .5; 3 SOLUTION RESPIRATORY (INHALATION) at 14:45

## 2022-01-01 RX ADMIN — AZITHROMYCIN MONOHYDRATE 500 MG: 250 TABLET ORAL at 09:11

## 2022-01-01 RX ADMIN — ACETAMINOPHEN 650 MG: 325 TABLET, FILM COATED ORAL at 16:12

## 2022-01-01 RX ADMIN — Medication 10 ML: at 21:54

## 2022-01-01 RX ADMIN — IPRATROPIUM BROMIDE AND ALBUTEROL SULFATE 3 ML: .5; 3 SOLUTION RESPIRATORY (INHALATION) at 11:20

## 2022-01-01 RX ADMIN — FENTANYL CITRATE 50 MCG: 50 INJECTION, SOLUTION INTRAMUSCULAR; INTRAVENOUS at 12:27

## 2022-01-01 RX ADMIN — IPRATROPIUM BROMIDE AND ALBUTEROL SULFATE 3 ML: .5; 3 SOLUTION RESPIRATORY (INHALATION) at 18:45

## 2022-01-01 RX ADMIN — AZITHROMYCIN MONOHYDRATE 500 MG: 500 INJECTION, POWDER, LYOPHILIZED, FOR SOLUTION INTRAVENOUS at 16:14

## 2022-01-01 RX ADMIN — ASPIRIN 81 MG CHEWABLE TABLET 81 MG: 81 TABLET CHEWABLE at 08:03

## 2022-01-01 RX ADMIN — IPRATROPIUM BROMIDE AND ALBUTEROL SULFATE 3 ML: .5; 3 SOLUTION RESPIRATORY (INHALATION) at 19:27

## 2022-01-01 RX ADMIN — Medication 10 ML: at 09:32

## 2022-01-01 RX ADMIN — IOPAMIDOL 100 ML: 755 INJECTION, SOLUTION INTRAVENOUS at 15:42

## 2022-01-01 RX ADMIN — IPRATROPIUM BROMIDE AND ALBUTEROL SULFATE 3 ML: .5; 3 SOLUTION RESPIRATORY (INHALATION) at 06:55

## 2022-01-01 RX ADMIN — AZITHROMYCIN MONOHYDRATE 500 MG: 250 TABLET ORAL at 15:35

## 2022-01-01 RX ADMIN — AZITHROMYCIN MONOHYDRATE 500 MG: 250 TABLET ORAL at 08:00

## 2022-01-01 RX ADMIN — ASPIRIN 81 MG CHEWABLE TABLET 81 MG: 81 TABLET CHEWABLE at 09:32

## 2022-01-01 RX ADMIN — LIDOCAINE HYDROCHLORIDE 6 ML: 10 INJECTION, SOLUTION INFILTRATION; PERINEURAL at 12:32

## 2022-01-01 RX ADMIN — MIDAZOLAM 1 MG: 1 INJECTION INTRAMUSCULAR; INTRAVENOUS at 12:33

## 2022-01-01 RX ADMIN — IOPAMIDOL 100 ML: 755 INJECTION, SOLUTION INTRAVENOUS at 15:23

## 2022-01-01 RX ADMIN — ENOXAPARIN SODIUM 40 MG: 100 INJECTION SUBCUTANEOUS at 15:35

## 2022-01-01 RX ADMIN — Medication 10 ML: at 08:33

## 2022-01-01 RX ADMIN — ASPIRIN 81 MG CHEWABLE TABLET 81 MG: 81 TABLET CHEWABLE at 10:55

## 2022-01-01 RX ADMIN — BUDESONIDE 0.5 MG: 0.5 INHALANT RESPIRATORY (INHALATION) at 07:18

## 2022-01-01 RX ADMIN — ALUMINUM HYDROXIDE, MAGNESIUM HYDROXIDE, AND DIMETHICONE 15 ML: 400; 400; 40 SUSPENSION ORAL at 22:25

## 2022-01-01 RX ADMIN — Medication 10 ML: at 09:08

## 2022-01-01 RX ADMIN — Medication 10 ML: at 22:25

## 2022-01-01 RX ADMIN — Medication 10 ML: at 22:08

## 2022-01-01 RX ADMIN — Medication 10 ML: at 20:34

## 2022-01-01 RX ADMIN — Medication 10 ML: at 08:10

## 2022-01-01 RX ADMIN — ONDANSETRON 4 MG: 2 INJECTION INTRAMUSCULAR; INTRAVENOUS at 12:22

## 2022-01-01 RX ADMIN — BUDESONIDE 0.5 MG: 0.5 INHALANT RESPIRATORY (INHALATION) at 18:45

## 2022-01-01 RX ADMIN — SODIUM CHLORIDE 200 MG: 9 INJECTION, SOLUTION INTRAVENOUS at 14:14

## 2022-01-01 RX ADMIN — BUDESONIDE 0.5 MG: 0.5 INHALANT RESPIRATORY (INHALATION) at 07:26

## 2022-01-01 RX ADMIN — ASPIRIN 81 MG CHEWABLE TABLET 81 MG: 81 TABLET CHEWABLE at 08:58

## 2022-01-01 RX ADMIN — GADOTERIDOL 13 ML: 279.3 INJECTION, SOLUTION INTRAVENOUS at 14:16

## 2022-01-01 RX ADMIN — ALUMINUM HYDROXIDE, MAGNESIUM HYDROXIDE, AND DIMETHICONE 15 ML: 400; 400; 40 SUSPENSION ORAL at 23:31

## 2022-01-01 RX ADMIN — AZITHROMYCIN MONOHYDRATE 500 MG: 500 INJECTION, POWDER, LYOPHILIZED, FOR SOLUTION INTRAVENOUS at 18:54

## 2022-01-01 RX ADMIN — LIDOCAINE HYDROCHLORIDE 5 ML: 10 INJECTION, SOLUTION INFILTRATION; PERINEURAL at 13:22

## 2022-01-01 RX ADMIN — IPRATROPIUM BROMIDE AND ALBUTEROL SULFATE 3 ML: .5; 3 SOLUTION RESPIRATORY (INHALATION) at 07:26

## 2022-01-01 RX ADMIN — Medication 10 ML: at 20:41

## 2022-01-01 RX ADMIN — BUDESONIDE 0.5 MG: 0.5 INHALANT RESPIRATORY (INHALATION) at 19:23

## 2022-01-01 RX ADMIN — ASPIRIN 81 MG CHEWABLE TABLET 81 MG: 81 TABLET CHEWABLE at 08:00

## 2022-01-01 RX ADMIN — SODIUM CHLORIDE 200 MG: 9 INJECTION, SOLUTION INTRAVENOUS at 11:32

## 2022-01-01 RX ADMIN — Medication 10 ML: at 21:06

## 2022-01-01 RX ADMIN — FENTANYL CITRATE 50 MCG: 50 INJECTION, SOLUTION INTRAMUSCULAR; INTRAVENOUS at 12:25

## 2022-01-01 RX ADMIN — FENTANYL CITRATE 50 MCG: 50 INJECTION, SOLUTION INTRAMUSCULAR; INTRAVENOUS at 12:34

## 2022-01-01 RX ADMIN — Medication 10 ML: at 09:11

## 2022-01-01 RX ADMIN — ASPIRIN 81 MG CHEWABLE TABLET 81 MG: 81 TABLET CHEWABLE at 08:32

## 2022-01-01 RX ADMIN — SODIUM CHLORIDE 200 MG: 9 INJECTION, SOLUTION INTRAVENOUS at 14:18

## 2022-01-01 RX ADMIN — SODIUM CHLORIDE 1000 ML: 9 INJECTION, SOLUTION INTRAVENOUS at 13:32

## 2022-01-01 RX ADMIN — SODIUM CHLORIDE 250 ML: 9 INJECTION, SOLUTION INTRAVENOUS at 14:05

## 2022-01-01 RX ADMIN — ASPIRIN 81 MG CHEWABLE TABLET 81 MG: 81 TABLET CHEWABLE at 09:11

## 2022-01-01 RX ADMIN — CEFTRIAXONE 1 G: 1 INJECTION, POWDER, FOR SOLUTION INTRAMUSCULAR; INTRAVENOUS at 17:34

## 2022-01-01 RX ADMIN — SODIUM CHLORIDE 250 ML: 9 INJECTION, SOLUTION INTRAVENOUS at 11:28

## 2022-01-01 RX ADMIN — ALUMINUM HYDROXIDE, MAGNESIUM HYDROXIDE, AND DIMETHICONE 15 ML: 400; 400; 40 SUSPENSION ORAL at 23:54

## 2022-09-13 PROBLEM — R06.02 SHORTNESS OF BREATH: Status: ACTIVE | Noted: 2022-01-01

## 2022-09-13 NOTE — H&P
[facility] Medicine Services      Patient Name: Deric Wylie  : 1933  MRN: [id number]  Primary Care Physician:  Bernardo Singh MD  Date of admission: 2022      Subjective      Chief Complaint: Shortness of breath    History of Present Illness: Deric Wlyie is a 89 y.o. male who presented to [facility] on 2022 complaining of shortness of breath which has been worsening over the last 4 weeks.  Patient was seen by his PCP with abnormal x-ray and scheduled outpatient CT of chest which was done at this facility.  Patient was encouraged to come to the ER for further evaluations.  The patient denies subjective fevers or chills.  The patient does experience dyspnea with exertion as well as at rest.  In the emergency room the patient's oxygen saturation was in the low 80s on room air.  Patient's was placed on oxygen with titration to 80% high flow.  Patient is resting comfortably on this oxygen setting.      Review of Systems   Cardiovascular: Positive for dyspnea on exertion. Negative for chest pain.   Respiratory: Positive for cough and shortness of breath.    All other systems reviewed and are negative.       Personal History     Past Medical History:   Diagnosis Date   • COPD (chronic obstructive pulmonary disease) (HCC)        Past Surgical History:   Procedure Laterality Date   • ABDOMINAL AORTIC ANEURYSM REPAIR     • APPENDECTOMY         Family History: family history includes Cancer in his brother; Heart disease in his father and mother. Otherwise pertinent FHx was reviewed and not pertinent to current issue.    Social History:  reports that he has never smoked. He has never used smokeless tobacco. He reports that he does not drink alcohol and does not use drugs.    Home Medications:  Prior to Admission Medications     Prescriptions Last Dose Informant Patient Reported? Taking?    albuterol sulfate  (90 Base) MCG/ACT inhaler   No No    Inhale 2  puffs Every 4 (Four) Hours As Needed for Wheezing.    aspirin 81 MG chewable tablet   Yes No    Chew 81 mg Daily.    benzonatate (TESSALON) 200 MG capsule   No No    Take 1 capsule by mouth 3 (Three) Times a Day As Needed for Cough.    cefdinir (OMNICEF) 300 MG capsule   No No    Take 1 capsule by mouth Every 12 (Twelve) Hours. Indications: Pneumonia    guaiFENesin (MUCINEX) 600 MG 12 hr tablet   No No    Take 1 tablet by mouth Every 12 (Twelve) Hours.    ipratropium-albuterol (DUO-NEB) 0.5-2.5 mg/3 ml nebulizer   No No    Take 3 mL by nebulization 4 (Four) Times a Day. COPD    predniSONE (DELTASONE) 20 MG tablet   No No    Take 1 tablet by mouth Daily.            Allergies:  No Known Allergies    Objective      Vitals:   Temp:  [97.9 °F (36.6 °C)-99 °F (37.2 °C)] 97.9 °F (36.6 °C)  Heart Rate:  [81-93] 89  Resp:  [18-29] 20  BP: (105-131)/(54-71) 131/71  Flow (L/min):  [6-10] 10    Physical Exam  Vitals and nursing note reviewed.   Constitutional:       Appearance: Normal appearance. He is not ill-appearing.   HENT:      Head: Normocephalic and atraumatic.      Right Ear: External ear normal.      Left Ear: External ear normal.      Nose: Nose normal.      Mouth/Throat:      Mouth: Mucous membranes are moist.   Eyes:      General: No scleral icterus.        Right eye: No discharge.         Left eye: No discharge.      Extraocular Movements: Extraocular movements intact.      Conjunctiva/sclera: Conjunctivae normal.      Pupils: Pupils are equal, round, and reactive to light.   Cardiovascular:      Rate and Rhythm: Normal rate and regular rhythm.      Pulses: Normal pulses.      Heart sounds: Normal heart sounds. No murmur heard.    No friction rub.   Pulmonary:      Effort: Pulmonary effort is normal. No respiratory distress.      Breath sounds: No stridor. Wheezing present. No rhonchi or rales.   Chest:      Chest wall: Tenderness present.   Abdominal:      General: Bowel sounds are normal.      Palpations:  Abdomen is soft.   Musculoskeletal:         General: Normal range of motion.      Cervical back: Normal range of motion and neck supple.      Right lower leg: No edema.      Left lower leg: No edema.   Skin:     General: Skin is warm and dry.      Capillary Refill: Capillary refill takes less than 2 seconds.      Coloration: Skin is not pale.   Neurological:      General: No focal deficit present.      Mental Status: He is alert and oriented to person, place, and time.   Psychiatric:         Mood and Affect: Mood normal.         Behavior: Behavior normal.         Thought Content: Thought content normal.         Judgment: Judgment normal.         Result Review    Result Review:  I have personally reviewed the results from the time of this admission to 9/13/2022 22:23 EDT and agree with these findings:  [x]  Laboratory  [x]  Microbiology  [x]  Radiology  []  EKG/Telemetry   []  Cardiology/Vascular   []  Pathology  [x]  Old records  []  Other:  Most notable findings include: Right upper lobe mass    Assessment & Plan        Active Hospital Problems:  Active Hospital Problems    Diagnosis    • **Shortness of breath      Plan:     Acute respiratory failure with hypoxia--likely secondary to right upper lobe mass and right pleural effusion: Oxygen support as needed; pulmonary consult; oncology consult    COPD, chronic: Add duo nebs as needed    Cardiovascular prophylaxis: Continue aspirin    DVT prophylaxis:  Medical DVT prophylaxis orders are present.    CODE STATUS:    Code Status (Patient has no pulse and is not breathing): CPR (Attempt to Resuscitate)  Medical Interventions (Patient has pulse or is breathing): Full Support    Admission Status:  I believe this patient meets inpatient status.    I discussed the patient's findings and my recommendations with patient.    This patient has been examined wearing appropriate Personal Protective Equipment. 09/13/22      Signature: Electronically signed by ANN Paris,  09/13/22, 10:28 PM EDT.

## 2022-09-13 NOTE — ED PROVIDER NOTES
Subjective   PIT    Patient presents with:  Abnormal CT    Bernardo Singh MD   No LMP for male patient.    Patient is an 89-year-old male presents emergency department after having an outpatient CT scan, was referred to the ER due to abnormality on CT scan and dyspnea.  Patient reports he was recent on antibiotics, as well as an allergy pill per his primary care provider, but he is unsure why he was on antibiotics.  Patient reports he has been short of breath for the past 2 to 3 weeks.  He denies abnormal leg pain or swelling.  No chest pain.  No fever chills.  No cough.  No dizziness, lightheadedness, diaphoresis or syncope.          Review of Systems   Constitutional: Negative for chills and fever.   Eyes: Negative for photophobia and visual disturbance.   Respiratory: Positive for shortness of breath.    Cardiovascular: Negative for chest pain and leg swelling.   Gastrointestinal: Negative for abdominal pain.   Genitourinary: Negative for dysuria.   Musculoskeletal: Negative for back pain and neck pain.   Skin: Negative for color change and rash.   Neurological: Negative for dizziness, syncope, weakness and light-headedness.   Psychiatric/Behavioral: Negative for confusion.       No past medical history on file.    No Known Allergies    Past Surgical History:   Procedure Laterality Date   • ABDOMINAL AORTIC ANEURYSM REPAIR     • APPENDECTOMY         Family History   Problem Relation Age of Onset   • Heart disease Father    • Heart disease Mother    • Cancer Brother        Social History     Socioeconomic History   • Marital status:    Tobacco Use   • Smoking status: Never Smoker   • Smokeless tobacco: Never Used   Substance and Sexual Activity   • Alcohol use: Never   • Drug use: Never   • Sexual activity: Defer           Objective   Physical Exam  Vitals and nursing note reviewed.   Constitutional:       General: He is in acute distress.      Appearance: He is ill-appearing.   HENT:      Head:  "Normocephalic and atraumatic.      Nose: Nose normal.      Mouth/Throat:      Mouth: Mucous membranes are moist.      Pharynx: Oropharynx is clear.   Eyes:      Extraocular Movements: Extraocular movements intact.      Conjunctiva/sclera: Conjunctivae normal.      Pupils: Pupils are equal, round, and reactive to light.   Cardiovascular:      Rate and Rhythm: Normal rate and regular rhythm.      Heart sounds: Normal heart sounds.   Pulmonary:      Effort: Respiratory distress (Able to speak in complete sentences) present.      Breath sounds: Rales present.   Abdominal:      General: Bowel sounds are normal.      Palpations: Abdomen is soft.      Tenderness: There is no abdominal tenderness. There is no guarding or rebound.   Musculoskeletal:         General: Normal range of motion.      Cervical back: Normal range of motion and neck supple.   Skin:     General: Skin is warm and dry.      Capillary Refill: Capillary refill takes less than 2 seconds.   Neurological:      Mental Status: He is alert and oriented to person, place, and time.   Psychiatric:         Mood and Affect: Mood normal.         Procedures           ED Course  ED Course as of 09/13/22 1901 Tue Sep 13, 2022   1625 Discussed with Dr. Murcia [LB]   1647 Patient will transition to high flow nasal cannula. [LB]   1736 Discussed with hospitalist [LB]      ED Course User Index  [LB] Anais Julien M, APRN      /68   Pulse 85   Temp 99 °F (37.2 °C) (Oral)   Resp (!) 29   Ht 180.3 cm (71\")   Wt 72.1 kg (159 lb)   SpO2 95%   BMI 22.18 kg/m²   Labs Reviewed   COMPREHENSIVE METABOLIC PANEL - Abnormal; Notable for the following components:       Result Value    Glucose 100 (*)     All other components within normal limits    Narrative:     GFR Normal >60  Chronic Kidney Disease <60  Kidney Failure <15     CBC WITH AUTO DIFFERENTIAL - Abnormal; Notable for the following components:    RBC 5.83 (*)     RDW 15.5 (*)     Lymphocyte % 10.6 (*)     " Monocyte % 13.0 (*)     All other components within normal limits   BLOOD GAS, ARTERIAL - Abnormal; Notable for the following components:    pO2, Arterial 73.4 (*)     Base Excess, Arterial -0.8 (*)     All other components within normal limits   RESPIRATORY PANEL PCR W/ COVID-19 (SARS-COV-2) DELFINA/TERENCE/KATHRYN/PAD/COR/MAD/DANIEL IN-HOUSE, NP SWAB IN UTM/VTP, 3-4 HR TAT - Normal    Narrative:     In the setting of a positive respiratory panel with a viral infection PLUS a negative procalcitonin without other underlying concern for bacterial infection, consider observing off antibiotics or discontinuation of antibiotics and continue supportive care. If the respiratory panel is positive for atypical bacterial infection (Bordetella pertussis, Chlamydophila pneumoniae, or Mycoplasma pneumoniae), consider antibiotic de-escalation to target atypical bacterial infection.   BNP (IN-HOUSE) - Normal    Narrative:     Among patients with dyspnea, NT-proBNP is highly sensitive for the detection of acute congestive heart failure. In addition NT-proBNP of <300 pg/ml effectively rules out acute congestive heart failure with 99% negative predictive value.    Results may be falsely decreased if patient taking Biotin.     TROPONIN (IN-HOUSE) - Normal    Narrative:     Troponin T Reference Range:  <= 0.03 ng/mL-   Negative for AMI  >0.03 ng/mL-     Abnormal for myocardial necrosis.  Clinicians would have to utilize clinical acumen, EKG, Troponin and serial changes to determine if it is an Acute Myocardial Infarction or myocardial injury due to an underlying chronic condition.       Results may be falsely decreased if patient taking Biotin.     POC LACTATE - Normal   POC LACTATE - Normal   BLOOD CULTURE   BLOOD CULTURE   BLOOD GAS, ARTERIAL   CBC AND DIFFERENTIAL    Narrative:     The following orders were created for panel order CBC & Differential.  Procedure                               Abnormality         Status                     ---------                                -----------         ------                     CBC Auto Differential[[id number]]        Abnormal            Final result                 Please view results for these tests on the individual orders.   EXTRA TUBES    Narrative:     The following orders were created for panel order Extra Tubes.  Procedure                               Abnormality         Status                     ---------                               -----------         ------                     Light Blue Top[[id number]]                                   Final result                 Please view results for these tests on the individual orders.   LIGHT BLUE TOP     Medications   azithromycin (ZITHROMAX) 500 mg in sodium chloride 0.9 % 250 mL IVPB-VTB (500 mg Intravenous Given 9/13/22 1854)   cefTRIAXone (ROCEPHIN) 1 g in sodium chloride 0.9 % 100 mL IVPB (0 g Intravenous Stopped 9/13/22 1826)     CT Chest With Contrast Diagnostic    Result Date: 9/13/2022  1. 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  Mass invades the right paratracheal mediastinum with occlusion of right subclavian vein and distal right internal jugular vein. Mild narrowing of the SVC. Mass extends to the right hilum with encasement of right upper lobe bronchus and severe narrowing and near occlusion of the right upper lobe pulmonary artery. 2. Large right pleural effusion with near complete collapse of the right lower lobe. 3. Severe emphysema with multiple additional pulmonary nodules concerning for pulmonary metastatic disease. 4. Extensive coronary and aortic atherosclerotic disease. 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 6. Left adrenal gland 1.7 cm nodule may relate to adrenal metastatic disease or adenoma, new from 2014. 7. Additional incidental findings above.  I discussed the critical findings above with Dr. Mazariegos at 2:44 p.m. on  9/13/2022.   Electronically Signed By-Jayant Garcia MD On:9/13/2022 4:25 PM This report was finalized on [id number] by  Jayant Garcia MD.                                         MDM    Record review: Patient's previous x-ray which revealed pleural effusion, as well as concern for malignancy in the right upper lobe.  Differentials: PE, pneumonia, malignancy  Patient was brought back to the emergency department room for evaluation and placed on appropriate monitoring.  Patient had IV established and blood work obtained.  Patient initially evaluated, O2 saturations 80% on room air.  He does not normally wear oxygen.  He said that he has been short of breath for the past 2 to 3 weeks.  Work-up here in the ED with reassuring lab work, however patient's CT scan is grossly abnormal, he was transition to high flow nasal cannula starting this well.  His respiratory effort is improved.  He will be admitted for further evaluation.  Managment of patient discussed with: Hospitalist  Note Disclaimer: At [facility], we believe that sharing information builds trust and better relationships. You are receiving this note because you recently visited [facility]. It is possible you will see health information before a provider has talked with you about it. This kind of information can be easy to misunderstand. To help you fully understand what it means for your health, we urge you to discuss this note with your provider.  Note dicatated utilizing Dragon Dictation.  Discussed with ED attending physician, Dr. Murcia.  Final diagnoses:   Shortness of breath   Hypoxia   Mass of upper lobe of right lung       ED Disposition  ED Disposition     ED Disposition   Decision to Admit    Condition   --    Comment   Level of Care: Telemetry [5]   Admitting Physician: HA AVALOS [[id number]]   Attending Physician: HA AVALOS [[id number]]               No follow-up provider specified.       Medication List      No changes  were made to your prescriptions during this visit.          Anais Julien, APRN  09/13/22 [id number]

## 2022-09-14 NOTE — PROCEDURES
Ultrasound-guided Chest Tube Insertion Procedure Note    Timeout was done appropriately    Indications:  Clinically significant Effusion    Preoperative Diagnosis: Effusion    Postoperative Diagnosis: Effusion    Procedure Details   Informed consent was obtained for the procedure, including sedation.  Risks of lung perforation, hemorrhage, arrhythmia, and adverse drug reaction were discussed.     After sterile skin prep, using standard technique,  Ultrasound was used to identify the fluid location which was marked   Lidocaine was used, the needle was introduced above the rib to identify the fluid  Larger needle was introduced and then a guidewire was passed through the needle which was removed. A dilator was used over the guidewire. After removing the dilator a pigtail catheter was placed and the guidewire was removed    Kinyarwanda tube was placed in the right lateral 8 rib space.    Findings:  1700 ml of serosanguinous fluid obtained    Estimated Blood Loss:  Minimal           Specimens:  Sent serosanguinous fluid                Complications:  None; patient tolerated the procedure well.           Disposition: PACU - hemodynamically stable.           Condition: stable    Attending Attestation: I performed the procedure.

## 2022-09-14 NOTE — PLAN OF CARE
Problem: Adult Inpatient Plan of Care  Goal: Plan of Care Review  Outcome: Ongoing, Progressing  Flowsheets (Taken 9/14/2022 0106)  Progress: improving  Plan of Care Reviewed With: patient  Outcome Evaluation: Patient rested throughout the night, no complaints at this time.  Goal: Patient-Specific Goal (Individualized)  Outcome: Ongoing, Progressing  Goal: Absence of Hospital-Acquired Illness or Injury  Outcome: Ongoing, Progressing  Intervention: Identify and Manage Fall Risk  Recent Flowsheet Documentation  Taken 9/14/2022 0019 by Arnoldo Mcghee RN  Safety Promotion/Fall Prevention: safety round/check completed  Taken 9/13/2022 2224 by Arnoldo Mcghee RN  Safety Promotion/Fall Prevention: safety round/check completed  Taken 9/13/2022 2148 by Arnoldo Mcghee RN  Safety Promotion/Fall Prevention:   safety round/check completed   room organization consistent   nonskid shoes/slippers when out of bed   clutter free environment maintained   assistive device/personal items within reach  Intervention: Prevent Skin Injury  Recent Flowsheet Documentation  Taken 9/13/2022 2148 by Arnoldo Mcghee RN  Skin Protection:   adhesive use limited   protective footwear used   transparent dressing maintained  Intervention: Prevent and Manage VTE (Venous Thromboembolism) Risk  Recent Flowsheet Documentation  Taken 9/13/2022 2148 by Arnoldo Mcghee RN  VTE Prevention/Management:   bilateral   sequential compression devices off  Intervention: Prevent Infection  Recent Flowsheet Documentation  Taken 9/13/2022 2148 by Arnoldo Mcghee RN  Infection Prevention:   single patient room provided   rest/sleep promoted   personal protective equipment utilized   hand hygiene promoted  Goal: Optimal Comfort and Wellbeing  Outcome: Ongoing, Progressing  Intervention: Provide Person-Centered Care  Recent Flowsheet Documentation  Taken 9/13/2022 2148 by Arnoldo Mcghee RN  Trust Relationship/Rapport:   care explained   emotional support  provided   choices provided   questions answered   empathic listening provided   questions encouraged   reassurance provided   thoughts/feelings acknowledged  Goal: Readiness for Transition of Care  Outcome: Ongoing, Progressing  Intervention: Mutually Develop Transition Plan  Recent Flowsheet Documentation  Taken 9/13/2022 2147 by Arnoldo Mcghee RN  Transportation Anticipated: family or friend will provide  Patient/Family Anticipated Services at Transition: none  Patient/Family Anticipates Transition to: home  Taken 9/13/2022 2145 by Arnoldo Mcghee RN  Equipment Currently Used at Home: none     Problem: COPD (Chronic Obstructive Pulmonary Disease) Comorbidity  Goal: Maintenance of COPD Symptom Control  Outcome: Ongoing, Progressing  Intervention: Maintain COPD-Symptom Control  Recent Flowsheet Documentation  Taken 9/14/2022 0019 by Arnoldo Mcghee RN  Medication Review/Management: medications reviewed  Taken 9/13/2022 2224 by Arnoldo Mcghee RN  Medication Review/Management: medications reviewed  Taken 9/13/2022 2148 by Arnoldo Mcghee, RN  Supportive Measures: active listening utilized  Medication Review/Management: medications reviewed   Goal Outcome Evaluation:  Plan of Care Reviewed With: patient        Progress: improving  Outcome Evaluation: Patient rested throughout the night, no complaints at this time.

## 2022-09-14 NOTE — PLAN OF CARE
Goal Outcome Evaluation:  Plan of Care Reviewed With: patient           Outcome Evaluation: 89 y/o male admitted on 9/13 due to worsening shortness of breath and found with abnormal CT scan; now s/p chest tube placement on 9/14. PMH includes emphysema. Patient lives with friend and is normally independent without a.d. nor supplemental O2; driving. At time of eval, patient is on 10L HFNC. Patient is modified independent with all bed mobility and transfers. Gait deferred due to newly place chest tube. No SOA, no dizziness reported with sitting/standing activity. Anticipate patient will continue to improve and be able to return home once medically stable, will follow and progress.

## 2022-09-14 NOTE — NURSING NOTE
Pt assisted back to bed, supine position.  Pt denies pain or SOB.  Chest tube to water seal until back to suction on floor.  Chamber changed out due to almost being full.  1900cc in old atrium.   Pt taken back to floor per transporter.  5L NC.

## 2022-09-14 NOTE — PROGRESS NOTES
[facility] Daily Progress Note    Patient Name: Deric Wylie  : 1933  MRN: [id number]  Primary Care Physician:  Bernardo Singh MD  Date of admission: 2022      Subjective      Chief Complaint:     Shortness of breath    Patient Reports       Notes and vitals reviewed  Patient has beenOn 10 L of high flow nasal cannula saturating mid 90s  Afebrile  Seen by Dr. Araya who recommended chest tube placement as well as IR guided biopsy from pleural-based lung mass  Still short of breath  White count is normal      ROS *  All other system reviewed and negative except as above      Objective      Vitals:   Temp:  [97.8 °F (36.6 °C)-99 °F (37.2 °C)] 97.8 °F (36.6 °C)  Heart Rate:  [81-93] 87  Resp:  [18-29] 24  BP: (105-138)/(54-74) 129/74  Flow (L/min):  [6-10] 10    Physical Exam     GENERAL APPEARANCE: Well developed, well nourished, alert and cooperative, and appears to be in no acute distress.  HEAD: normocephalic.  EYES: PERRL, EOMI. vision intact grossly.  EARS: Intact hearing.  No gross abnormalities.  NOSE: No nasal discharge.  THROAT: Clear   NECK: Neck supple, non-tender without lymphadenopathy, masses or thyromegaly.  CARDIAC: Normal S1 and S2. No S3, S4 or murmurs. Rhythm is regular. There is no peripheral edema, cyanosis or pallor. Extremities are warm and well perfused. Capillary refill is less than 2 seconds. No carotid bruits.  LUNGS: Clear to auscultation and percussion without rales, rhonchi, wheezing or diminished breath sounds.  ABDOMEN: Positive bowel sounds. Soft, nondistended, nontender. No guarding or rebound. No masses.  MUSKULOSKELETAL: No deformity or swelling   BACK: No abnormalities noted     EXTREMITIES: No significant deformity or joint abnormality. No edema. Peripheral pulses intact. No varicosities.  LOWER EXTREMITY: No edema or swelling  NEUROLOGICAL: CN II-XII intact. Strength and sensation symmetric and intact throughout.  Reflexes 2+ throughout. Cerebellar testing normal.  SKIN: Skin normal color, texture and turgor with no lesions or eruptions.  PSYCHIATRIC: Alert cooperative not suicidal          Result Review    Result Review:  I have personally reviewed the results from the time of this admission to 9/14/2022 10:49 EDT and agree with these findings:  [x]  Laboratory  [x]  Microbiology  [x]  Radiology  [x]  EKG/Telemetry   []  Cardiology/Vascular   []  Pathology  []  Old records  []  Other:  Most notable findings include: **As above          Assessment & Plan      Brief Patient Summary:  Deric Wylie is a 89 y.o. male who *presents with lung mass and pleural effusion    azithromycin, 500 mg, Intravenous, Q24H  enoxaparin, 40 mg, Subcutaneous, Q24H  sodium chloride, 10 mL, Intravenous, Q12H             Active Hospital Problems:  Active Hospital Problems    Diagnosis     **Shortness of breath      Plan:     History of Present Illness: Deric Wylie is a 89 y.o. male who presented to [facility] on 9/13/2022 complaining of shortness of breath which has been worsening over the last 4 weeks.  Patient was seen by his PCP with abnormal x-ray and scheduled outpatient CT of chest which was done at this facility.  Patient was encouraged to come to the ER for further evaluations.  The patient denies subjective fevers or chills.  The patient does experience dyspnea with exertion as well as at rest.  In the emergency room the patient's oxygen saturation was in the low 80s on room air.  Patient's was placed on oxygen with titration to 80% high flow.  Patient is resting comfortably on this oxygen setting.    Assessment/plan        Acute respiratory failure with hypoxia--likely secondary to right upper lobe mass and right pleural effusion: Oxygen support as needed; pulmonary consult; oncology consult  Pleural-based lung mass-seen by Dr. OcampoIR guided biopsy  Left pleural effusion s/p thoracocentesis by Dr. Marshal Mclaughlin on  cultures and labs          COPD, chronic: Add duo nebs as needed     Cardiovascular prophylaxis: Continue aspirin     DVT prophylaxis:  Medical DVT prophylaxis orders are present.      DVT prophylaxis:  Medical DVT prophylaxis orders are present.    CODE STATUS:    Code Status (Patient has no pulse and is not breathing): CPR (Attempt to Resuscitate)  Medical Interventions (Patient has pulse or is breathing): Full Support      Disposition:  I expect patient to be discharged   Home.    This patient has been examined wearing appropriate Personal Protective Equipment and discussed with hospital infection control department. 09/14/22      Electronically signed by Bernardo Lorenzo MD, 09/14/22, 10:49 EDT.  [facility]

## 2022-09-14 NOTE — THERAPY EVALUATION
Patient Name: Deric Wylie  : 1933    MRN: [id number]                              Today's Date: 2022       Admit Date: 2022    Visit Dx:     ICD-10-CM ICD-9-CM   1. Shortness of breath  R06.02 786.05   2. Hypoxia  R09.02 799.02   3. Mass of upper lobe of right lung  R91.8 786.6     Patient Active Problem List   Diagnosis   • Acute respiratory distress   • Acute respiratory failure with hypoxia (HCC)   • Emphysema lung (HCC)   • COPD with acute exacerbation (HCC)   • Hyponatremia   • Generalized weakness   • Chronic venous stasis   • Shortness of breath     Past Medical History:   Diagnosis Date   • COPD (chronic obstructive pulmonary disease) (HCC)      Past Surgical History:   Procedure Laterality Date   • ABDOMINAL AORTIC ANEURYSM REPAIR     • APPENDECTOMY        General Information     Row Name 22 1026          Physical Therapy Time and Intention    Document Type evaluation  -CR     Mode of Treatment physical therapy  -CR     Row Name 22 1026          General Information    Patient Profile Reviewed yes  -CR     Prior Level of Function independent:;all household mobility;community mobility;home management;driving  -CR     Existing Precautions/Restrictions --  new chest tube R side  -CR     Barriers to Rehab none identified  -CR     Row Name 22 1026          Living Environment    People in Home friend(s)  -CR     Row Name 22 1026          Home Main Entrance    Number of Stairs, Main Entrance none  -CR     Row Name 22 1026          Stairs Within Home, Primary    Number of Stairs, Within Home, Primary none  -CR     Row Name 22 1026          Cognition    Orientation Status (Cognition) oriented x 4  -CR     Row Name 22 1026          Safety Issues, Functional Mobility    Impairments Affecting Function (Mobility) pain;shortness of breath  -CR           User Key  (r) = Recorded By, (t) = Taken By, (c) = Cosigned By    Initials Name Provider Type    CR Reyes,  LEONIE Ulrich Physical Therapist               Mobility     Row Name 09/14/22 1030          Bed Mobility    Bed Mobility bed mobility (all) activities  -CR     All Activities, [place] (Bed Mobility) modified independence  -CR     Assistive Device (Bed Mobility) bed rails;head of bed elevated  -CR     Row Name 09/14/22 1030          Sit-Stand Transfer    Sit-Stand [place] (Transfers) independent  -CR     Row Name 09/14/22 1030          Gait/Stairs (Locomotion)    Comment, (Gait/Stairs) new chest tube placed could not be off wall suction  -CR           User Key  (r) = Recorded By, (t) = Taken By, (c) = Cosigned By    Initials Name Provider Type    CR Reyes, Carmela, PT Physical Therapist               Obj/Interventions     Row Name 09/14/22 1033          Range of Motion Comprehensive    General Range of Motion no range of motion deficits identified  -CR     Row Name 09/14/22 1033          Strength Comprehensive (MMT)    General Manual Muscle Testing (MMT) Assessment no strength deficits identified  -CR     Row Name 09/14/22 1033          Balance    Balance Assessment sitting static balance;sit to stand dynamic balance;standing static balance  -CR     Static Sitting Balance independent  -CR     Position, Sitting Balance sitting edge of bed  -CR     Sit to Stand Dynamic Balance modified independence  -CR     Static Standing Balance standby assist  -CR     Position/Device Used, Standing Balance unsupported  -CR     Row Name 09/14/22 1033          Sensory Assessment (Somatosensory)    Sensory Assessment (Somatosensory) sensation intact  -CR           User Key  (r) = Recorded By, (t) = Taken By, (c) = Cosigned By    Initials Name Provider Type    CR Reyes, Carmela, PT Physical Therapist               Goals/Plan     Row Name 09/14/22 1044          Gait Training Goal 1 (PT)    Activity/Assistive Device (Gait Training Goal 1, PT) gait (walking locomotion);increase endurance/gait distance  -CR     [place] Level  (Gait Training Goal 1, PT) standby assist  -CR     Distance (Gait Training Goal 1, PT) 100 ft  -CR     Time Frame (Gait Training Goal 1, PT) long term goal (LTG);2 weeks  -CR     Row Name 09/14/22 1044          Problem Specific Goal 1 (PT)    Problem Specific Goal 1 (PT) tolerating HEP without desat  -CR     Time Frame (Problem Specific Goal 1, PT) long-term goal (LTG);2 weeks  -CR     Row Name 09/14/22 1044          Therapy Assessment/Plan (PT)    Planned Therapy Interventions (PT) gait training;home exercise program;patient/family education  -CR           User Key  (r) = Recorded By, (t) = Taken By, (c) = Cosigned By    Initials Name Provider Type    CR Reyes, Carmela, PT Physical Therapist               Clinical Impression     Row Name 09/14/22 1035          Pain    Pretreatment Pain Rating 3/10  -CR     Posttreatment Pain Rating 3/10  -CR     Pain Location - Side/Orientation Right  -CR     Pre/Posttreatment Pain Comment sore chest tube site  -CR     Row Name 09/14/22 1035          Plan of Care Review    Plan of Care Reviewed With patient  -CR     Outcome Evaluation 88 y/o male admitted on 9/13 due to worsening shortness of breath and found with abnormal CT scan; now s/p chest tube placement on 9/14. PMH includes emphysema. Patient lives with friend and is normally independent without a.d. nor supplemental O2; driving. At time of eval, patient is on 10L HFNC. Patient is modified independent with all bed mobility and transfers. Gait deferred due to newly place chest tube. No SOA, no dizziness reported with sitting/standing activity. Anticipate patient will continue to improve and be able to return home once medically stable, will follow and progress.  -CR     Row Name 09/14/22 1035          Therapy Assessment/Plan (PT)    Patient/Family Therapy Goals Statement (PT) home  -CR     Rehab Potential (PT) good, to achieve stated therapy goals  -CR     Criteria for Skilled Interventions Met (PT) yes;skilled treatment is  necessary  -CR     Therapy Frequency (PT) 5 times/wk  -CR     Predicted Duration of Therapy Intervention (PT) at dc  -CR     Row Name 09/14/22 1035          Positioning and Restraints    Pre-Treatment Position in bed  -CR     Post Treatment Position bed  -CR     In Bed notified nsg;call light within reach;supine  -CR           User Key  (r) = Recorded By, (t) = Taken By, (c) = Cosigned By    Initials Name Provider Type    CR Reyes, Carmela, PT Physical Therapist               Outcome Measures     Row Name 09/14/22 1049 09/14/22 0900       How much help from another person do you currently need...    Turning from your back to your side while in flat bed without using bedrails? 3  -CR 4  -TM    Moving from lying on back to sitting on the side of a flat bed without bedrails? 3  -CR 4  -TM    Moving to and from a bed to a chair (including a wheelchair)? 4  -CR 4  -TM    Standing up from a chair using your arms (e.g., wheelchair, bedside chair)? 4  -CR 4  -TM    Climbing 3-5 steps with a railing? 3  -CR 3  -TM    To walk in hospital room? 3  -CR 3  -TM    AM-PAC 6 Clicks Score (PT) 20  -CR 22  -TM          User Key  (r) = Recorded By, (t) = Taken By, (c) = Cosigned By    Initials Name Provider Type    CR Reyes, Carmela, PT Physical Therapist    TM Lizet Lincoln RN Registered Nurse                             Physical Therapy Education                 Title: PT OT SLP Therapies (In Progress)     Topic: Physical Therapy (In Progress)     Point: Mobility training (Done)     Learning Progress Summary           Patient Acceptance, E, VU by CR at 9/14/2022 1052                   Point: Home exercise program (Not Started)     Learner Progress:  Not documented in this visit.          Point: Body mechanics (Not Started)     Learner Progress:  Not documented in this visit.          Point: Precautions (Not Started)     Learner Progress:  Not documented in this visit.                      User Key     Initials Effective Dates Name  Provider Type Discipline    CR 06/16/21 -  Reyes, Carmela, PT Physical Therapist PT              PT Recommendation and Plan  Planned Therapy Interventions (PT): gait training, home exercise program, patient/family education  Plan of Care Reviewed With: patient  Outcome Evaluation: 89 y/o male admitted on 9/13 due to worsening shortness of breath and found with abnormal CT scan; now s/p chest tube placement on 9/14. PMH includes emphysema. Patient lives with friend and is normally independent without a.d. nor supplemental O2; driving. At time of eval, patient is on 10L HFNC. Patient is modified independent with all bed mobility and transfers. Gait deferred due to newly place chest tube. No SOA, no dizziness reported with sitting/standing activity. Anticipate patient will continue to improve and be able to return home once medically stable, will follow and progress.     Time Calculation:    PT Charges     Row Name 09/14/22 1059             Time Calculation    Start Time 1009  -CR      Stop Time 1024  -CR      Time Calculation (min) 15 min  -CR      PT Received On 09/14/22  -CR      PT - Next Appointment 09/15/22  -CR      PT Goal Re-Cert Due Date 09/28/22  -CR              Time Calculation- PT    Total Timed Code Minutes- PT 0 minute(s)  -CR            User Key  (r) = Recorded By, (t) = Taken By, (c) = Cosigned By    Initials Name Provider Type    CR Reyes, Carmela, PT Physical Therapist              Therapy Charges for Today     Code Description Service Date Service Provider Modifiers Qty    17812706423 HC PT EVAL LOW COMPLEXITY 3 9/14/2022 Reyes, Carmela, PT GP 1          PT G-Codes  AM-PAC 6 Clicks Score (PT): 20    Carmela Reyes, PT  9/14/2022

## 2022-09-14 NOTE — CONSULTS
[facility]: [facility]         CONSULT NOTE    Patient Identification:  Deric Wylie  89 y.o.  male  2/25/1933  [id number]            Requesting physician: Attending physician    Reason for Consultation: Paratracheal mass, pleural effusion      History of Present Illness:    Deric Wylie is an 89-year-old male who presents to [facility] ED on 9/13/2022 with complaints of worsening shortness of breath that has progressed over the past 4 weeks.  He was seen by his PCP and had scheduled outpatient chest CT that was done at this facility.  He was encouraged to come to ER for further evaluation.  He has a past medical history of COPD.    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    Large right pleural effusion    Chronic obstructive pulmonary disease  Severe emphysema    Recommendations:    Ultrasound guided chest catheter placement right side for diagnostic and therapeutic purposes     Consult IR for CT-guided biopsy right upper lobe lung mass pleural-based     titrate oxygen, currently requiring 10 L per NC    Antibiotic azithromycin  DVT prophylaxis Lovenox        Review of Sytems:  Review of Systems   Respiratory: Positive for cough and shortness of breath.        Past Medical History:  Past Medical History:   Diagnosis Date   • COPD (chronic obstructive pulmonary disease) (HCC)        Past Surgical History:  Past Surgical History:   Procedure Laterality Date   • ABDOMINAL AORTIC ANEURYSM REPAIR     • APPENDECTOMY          Home Meds:  Medications Prior to Admission   Medication Sig Dispense Refill Last Dose   • aspirin 81 MG chewable tablet Chew 81 mg Daily.   9/13/2022 at Unknown time   • albuterol sulfate  (90 Base) MCG/ACT inhaler Inhale 2 puffs Every 4 (Four) Hours As Needed  "for Wheezing. 1 inhaler 1    • benzonatate (TESSALON) 200 MG capsule Take 1 capsule by mouth 3 (Three) Times a Day As Needed for Cough. 30 capsule 0    • cefdinir (OMNICEF) 300 MG capsule Take 1 capsule by mouth Every 12 (Twelve) Hours. Indications: Pneumonia 14 capsule 0    • guaiFENesin (MUCINEX) 600 MG 12 hr tablet Take 1 tablet by mouth Every 12 (Twelve) Hours. 60 tablet 0    • ipratropium-albuterol (DUO-NEB) 0.5-2.5 mg/3 ml nebulizer Take 3 mL by nebulization 4 (Four) Times a Day. COPD 360 mL 0    • predniSONE (DELTASONE) 20 MG tablet Take 1 tablet by mouth Daily. 5 tablet 0        Allergies:  No Known Allergies    Social History:   Social History     Socioeconomic History   • Marital status:    Tobacco Use   • Smoking status: Never Smoker   • Smokeless tobacco: Never Used   Substance and Sexual Activity   • Alcohol use: Never   • Drug use: Never   • Sexual activity: Defer       Family History:  Family History   Problem Relation Age of Onset   • Heart disease Father    • Heart disease Mother    • Cancer Brother        Physical Exam:  /74 (BP Location: Left arm, Patient Position: Lying)   Pulse 84   Temp 98.1 °F (36.7 °C) (Oral)   Resp 24   Ht 180.3 cm (71\")   Wt 64.3 kg (141 lb 12.8 oz)   SpO2 97%   BMI 19.78 kg/m²  Body mass index is 19.78 kg/m². 97% 64.3 kg (141 lb 12.8 oz)  Physical Exam    LABS:  Lab Results   Component Value Date    CALCIUM 10.2 09/13/2022     Results from last 7 days   Lab Units 09/13/22  2340 09/13/22  1617 09/13/22  1617 09/13/22  1527   SODIUM mmol/L 138  --  137  --    POTASSIUM mmol/L 3.9  --  4.1  --    CHLORIDE mmol/L 101  --  100  --    CO2 mmol/L 26.0  --  26.0  --    BUN mg/dL 13  --  15  --    CREATININE mg/dL 0.79  --  0.86 1.00   GLUCOSE mg/dL 166*   < > 100*  --    CALCIUM mg/dL 10.2  --  10.5  --    WBC 10*3/mm3 8.40  --  6.80  --    HEMOGLOBIN g/dL 14.6  --  16.3  --    PLATELETS 10*3/mm3 166  --  182  --    ALT (SGPT) U/L  --   --  15  --    AST (SGOT) " U/L  --   --  18  --    PROBNP pg/mL  --   --  296.9  --     < > = values in this interval not displayed.     Lab Results   Component Value Date    TROPONINT <0.010 09/13/2022     Results from last 7 days   Lab Units 09/13/22  1617   TROPONIN T ng/mL <0.010         Results from last 7 days   Lab Units 09/13/22  1615   LACTATE mmol/L 1.7     Results from last 7 days   Lab Units 09/13/22  1641   PH, ARTERIAL pH units 7.375   PCO2, ARTERIAL mm Hg 42.0   PO2 ART mm Hg 73.4*   O2 SATURATION ART % 94.1   MODALITY  Cannula     Results from last 7 days   Lab Units 09/13/22  1642   ADENOVIRUS DETECTION BY PCR  Not Detected   CORONAVIRUS 229E  Not Detected   CORONAVIRUS HKU1  Not Detected   CORONAVIRUS NL63  Not Detected   CORONAVIRUS OC43  Not Detected   HUMAN METAPNEUMOVIRUS  Not Detected   HUMAN RHINOVIRUS/ENTEROVIRUS  Not Detected   INFLUENZA B PCR  Not Detected   PARAINFLUENZA 1  Not Detected   PARAINFLUENZA VIRUS 2  Not Detected   PARAINFLUENZA VIRUS 3  Not Detected   PARAINFLUENZA VIRUS 4  Not Detected   BORDETELLA PERTUSSIS PCR  Not Detected   CHLAMYDOPHILA PNEUMONIAE PCR  Not Detected   MYCOPLAMA PNEUMO PCR  Not Detected   INFLUENZA A PCR  Not Detected   RSV, PCR  Not Detected             No results found for: TSH  Estimated Creatinine Clearance: 57.7 mL/min (by C-G formula based on SCr of 0.79 mg/dL).         Imaging:  Imaging Results (Last 24 Hours)     ** No results found for the last 24 hours. **            Current Meds:   SCHEDULE  azithromycin, 500 mg, Intravenous, Q24H  enoxaparin, 40 mg, Subcutaneous, Q24H  sodium chloride, 10 mL, Intravenous, Q12H      Infusions     PRNs  •  acetaminophen **OR** acetaminophen **OR** acetaminophen  •  ipratropium-albuterol  •  ondansetron **OR** ondansetron  •  sodium chloride        ANN Bentley  9/14/2022  07:18 EDT      Much of this encounter note is an electronic transcription/translation of spoken language to printed text using Dragon Software.

## 2022-09-14 NOTE — CONSULTS
Hematology/Oncology Inpatient Consultation    Patient name: Deric Wylie  : 1933  MRN: [id number]  Referring Provider: ANN Paris  Reason for Consultation: Right upper lobe mass    Chief complaint: Shortness of breath and abnormal CT scan    History of present illness:    Deric Wylie is a 89 y.o. male who presented to [facility] on 2022 with complaints of shortness of breath.  The patient reported worsening shortness of breath over the last 4 weeks.  Reported that dyspnea was with exertion as well as at rest.  Also noted cough.  Reported he had been recently treated with antibiotics as well as an allergy pill per his primary care provider.  He denied leg pain or swelling, no chest pain, no fever or chills, no dizziness, no lightheadedness no diaphoresis or syncope.  Oxygen levels upon presentation to the ER were 80% on room air.  Chest x-ray on 2022 showed a masslike opacity in the right upper lobe suspicious for neoplasm, moderate size right pleural effusion with consolidation right lower lobe.    In the ER, negative troponin and proBNP.  Normal metabolic panel.  WBC 6.80, hemoglobin 16.3, platelets 192,000.  CT of the chest with contrast performed showed within the medial right upper lobe a large malignant mass which measured 6.1 x 4.9 x 6.5 cm consistent with pulmonary malignancy.  Partially encasing the superior vena cava with approximately 50% luminal narrowing.  Mass causes occlusion of the right subclavian vein and the right internal jugular vein with multiple prominent venous collaterals in the right upper chest and neck.  Extension into the right paratracheal mediastinum and right hilum.  Complete encasement severe narrowing of the right upper lobe pulmonary artery with near occlusion.  Mass encases the right upper lobe bronchus with luminal narrowing.  Negative for subcarinal and left hilar adenopathy.  No axillary adenopathy.  Large right pleural effusion  with near complete collapse of the right lower lobe.  Multiple additional bilateral pulmonary nodules concerning for metastatic disease.  Left adrenal gland 1.7 cm nodule may relate to adrenal metastatic disease or adenoma, new finding from 2014.    09/14/22  Hematology/Oncology was consulted for a right upper lobe lung mass.    He/She  has a past medical history of COPD (chronic obstructive pulmonary disease) (HCC).    PCP: Bernardo Singh MD    History:  Past Medical History:   Diagnosis Date   • COPD (chronic obstructive pulmonary disease) (HCC)    ,   Past Surgical History:   Procedure Laterality Date   • ABDOMINAL AORTIC ANEURYSM REPAIR     • APPENDECTOMY     ,   Family History   Problem Relation Age of Onset   • Heart disease Father    • Heart disease Mother    • Cancer Brother    ,   Social History     Tobacco Use   • Smoking status: Never Smoker   • Smokeless tobacco: Never Used   Substance Use Topics   • Alcohol use: Never   • Drug use: Never   ,   Medications Prior to Admission   Medication Sig Dispense Refill Last Dose   • aspirin 81 MG chewable tablet Chew 81 mg Daily.   9/13/2022 at Unknown time   • albuterol sulfate  (90 Base) MCG/ACT inhaler Inhale 2 puffs Every 4 (Four) Hours As Needed for Wheezing. 1 inhaler 1    • benzonatate (TESSALON) 200 MG capsule Take 1 capsule by mouth 3 (Three) Times a Day As Needed for Cough. 30 capsule 0    • cefdinir (OMNICEF) 300 MG capsule Take 1 capsule by mouth Every 12 (Twelve) Hours. Indications: Pneumonia 14 capsule 0    • guaiFENesin (MUCINEX) 600 MG 12 hr tablet Take 1 tablet by mouth Every 12 (Twelve) Hours. 60 tablet 0    • ipratropium-albuterol (DUO-NEB) 0.5-2.5 mg/3 ml nebulizer Take 3 mL by nebulization 4 (Four) Times a Day. COPD 360 mL 0    • predniSONE (DELTASONE) 20 MG tablet Take 1 tablet by mouth Daily. 5 tablet 0    , Scheduled Meds:  azithromycin, 500 mg, Intravenous, Q24H  enoxaparin, 40 mg, Subcutaneous, Q24H  sodium chloride, 10 mL,  "Intravenous, Q12H    , Continuous Infusions:   , PRN Meds:  •  acetaminophen **OR** acetaminophen **OR** acetaminophen  •  ipratropium-albuterol  •  ondansetron **OR** ondansetron  •  sodium chloride   Allergies:  Patient has no known allergies.    Subjective     ROS:  Review of Systems   Constitutional: Positive for fatigue. Negative for fever.   HENT: Negative for congestion and nosebleeds.    Eyes: Negative for pain.   Respiratory: Positive for shortness of breath. Negative for cough.    Cardiovascular: Negative for chest pain.   Gastrointestinal: Negative for abdominal pain, blood in stool, diarrhea, nausea and vomiting.   Endocrine: Negative for cold intolerance and heat intolerance.   Genitourinary: Negative for difficulty urinating.   Musculoskeletal: Negative for arthralgias.   Skin: Negative for rash.   Neurological: Negative for dizziness and headaches.   Hematological: Does not bruise/bleed easily.   Psychiatric/Behavioral: Negative for behavioral problems.        Objective   Vital Signs:   /60   Pulse 98   Temp 97.8 °F (36.6 °C) (Oral)   Resp 16   Ht 180.3 cm (71\")   Wt 64.3 kg (141 lb 12.8 oz)   SpO2 100% Comment: 5L NC  BMI 19.78 kg/m²     Physical Exam: (performed by MD)  Physical Exam  Constitutional:       Appearance: Normal appearance.   HENT:      Head: Normocephalic and atraumatic.   Eyes:      Pupils: Pupils are equal, round, and reactive to light.   Cardiovascular:      Rate and Rhythm: Normal rate and regular rhythm.      Pulses: Normal pulses.      Heart sounds: No murmur heard.  Pulmonary:      Effort: Pulmonary effort is normal.      Breath sounds: Normal breath sounds.      Comments: Right chest tube in place  Abdominal:      General: There is no distension.      Palpations: Abdomen is soft. There is no mass.      Tenderness: There is no abdominal tenderness.   Musculoskeletal:         General: Normal range of motion.      Cervical back: Normal range of motion.   Skin:     " General: Skin is warm.   Neurological:      General: No focal deficit present.      Mental Status: He is alert.   Psychiatric:         Mood and Affect: Mood normal.         Results Review:  Lab Results (last 48 hours)     Procedure Component Value Units Date/Time    Protime-INR [[id number]] Collected: 09/14/22 0743    Specimen: Blood from Cannula Updated: 09/14/22 0756    Basic Metabolic Panel [[id number]]  (Abnormal) Collected: 09/13/22 2340    Specimen: Blood Updated: 09/14/22 0109     Glucose 166 mg/dL      BUN 13 mg/dL      Creatinine 0.79 mg/dL      Sodium 138 mmol/L      Potassium 3.9 mmol/L      Chloride 101 mmol/L      CO2 26.0 mmol/L      Calcium 10.2 mg/dL      BUN/Creatinine Ratio 16.5     Anion Gap 11.0 mmol/L      eGFR 84.9 mL/min/1.73      Comment: National Kidney Foundation and American Society of Nephrology (ASN) Task Force recommended calculation based on the Chronic Kidney Disease Epidemiology Collaboration (CKD-EPI) equation refit without adjustment for race.       Narrative:      GFR Normal >60  Chronic Kidney Disease <60  Kidney Failure <15      CBC Auto Differential [[id number]]  (Abnormal) Collected: 09/13/22 2340    Specimen: Blood Updated: 09/14/22 0048     WBC 8.40 10*3/mm3      RBC 5.27 10*6/mm3      Hemoglobin 14.6 g/dL      Hematocrit 44.4 %      MCV 84.3 fL      MCH 27.6 pg      MCHC 32.8 g/dL      RDW 15.4 %      RDW-SD 45.5 fl      MPV 8.8 fL      Platelets 166 10*3/mm3      Neutrophil % 78.1 %      Lymphocyte % 8.9 %      Monocyte % 12.2 %      Eosinophil % 0.5 %      Basophil % 0.3 %      Neutrophils, Absolute 6.50 10*3/mm3      Lymphocytes, Absolute 0.70 10*3/mm3      Monocytes, Absolute 1.00 10*3/mm3      Eosinophils, Absolute 0.00 10*3/mm3      Basophils, Absolute 0.00 10*3/mm3      nRBC 0.1 /100 WBC     Respiratory Panel PCR w/COVID-19(SARS-CoV-2) DELFINA/TERENCE/KATHRYN/PAD/COR/MAD/DANIEL In-House, NP Swab in UTM/VTM, 3-4 HR TAT - Swab, Nasopharynx [[id number]]  (Normal) Collected: 09/13/22 6303     Specimen: Swab from Nasopharynx Updated: 09/13/22 2656     ADENOVIRUS, PCR Not Detected     Coronavirus 229E Not Detected     Coronavirus HKU1 Not Detected     Coronavirus NL63 Not Detected     Coronavirus OC43 Not Detected     COVID19 Not Detected     Human Metapneumovirus Not Detected     Human Rhinovirus/Enterovirus Not Detected     Influenza A PCR Not Detected     Influenza B PCR Not Detected     Parainfluenza Virus 1 Not Detected     Parainfluenza Virus 2 Not Detected     Parainfluenza Virus 3 Not Detected     Parainfluenza Virus 4 Not Detected     RSV, PCR Not Detected     Bordetella pertussis pcr Not Detected     Bordetella parapertussis PCR Not Detected     Chlamydophila pneumoniae PCR Not Detected     Mycoplasma pneumo by PCR Not Detected    Narrative:      In the setting of a positive respiratory panel with a viral infection PLUS a negative procalcitonin without other underlying concern for bacterial infection, consider observing off antibiotics or discontinuation of antibiotics and continue supportive care. If the respiratory panel is positive for atypical bacterial infection (Bordetella pertussis, Chlamydophila pneumoniae, or Mycoplasma pneumoniae), consider antibiotic de-escalation to target atypical bacterial infection.    Extra Tubes [[id number]] Collected: 09/13/22 1617    Specimen: Blood, Venous Line Updated: 09/13/22 1734    Narrative:      The following orders were created for panel order Extra Tubes.  Procedure                               Abnormality         Status                     ---------                               -----------         ------                     Light Blue Top[[id number]]                                   Final result                 Please view results for these tests on the individual orders.    Light Blue Top [[id number]] Collected: 09/13/22 1617    Specimen: Blood Updated: 09/13/22 1734     Extra Tube Hold for add-ons.     Comment: Auto resulted       Comprehensive  Metabolic Panel [[id number]]  (Abnormal) Collected: 09/13/22 1617    Specimen: Blood Updated: 09/13/22 1700     Glucose 100 mg/dL      BUN 15 mg/dL      Creatinine 0.86 mg/dL      Sodium 137 mmol/L      Potassium 4.1 mmol/L      Comment: Slight hemolysis detected by analyzer. Results may be affected.        Chloride 100 mmol/L      CO2 26.0 mmol/L      Calcium 10.5 mg/dL      Total Protein 6.9 g/dL      Albumin 3.50 g/dL      ALT (SGPT) 15 U/L      AST (SGOT) 18 U/L      Comment: Slight hemolysis detected by analyzer. Results may be affected.        Alkaline Phosphatase 107 U/L      Total Bilirubin 0.9 mg/dL      Globulin 3.4 gm/dL      A/G Ratio 1.0 g/dL      BUN/Creatinine Ratio 17.4     Anion Gap 11.0 mmol/L      eGFR 82.8 mL/min/1.73      Comment: National Kidney Foundation and American Society of Nephrology (ASN) Task Force recommended calculation based on the Chronic Kidney Disease Epidemiology Collaboration (CKD-EPI) equation refit without adjustment for race.       Narrative:      GFR Normal >60  Chronic Kidney Disease <60  Kidney Failure <15      Troponin [[id number]]  (Normal) Collected: 09/13/22 1617    Specimen: Blood Updated: 09/13/22 1656     Troponin T <0.010 ng/mL     Narrative:      Troponin T Reference Range:  <= 0.03 ng/mL-   Negative for AMI  >0.03 ng/mL-     Abnormal for myocardial necrosis.  Clinicians would have to utilize clinical acumen, EKG, Troponin and serial changes to determine if it is an Acute Myocardial Infarction or myocardial injury due to an underlying chronic condition.       Results may be falsely decreased if patient taking Biotin.      BNP [[id number]]  (Normal) Collected: 09/13/22 1617    Specimen: Blood Updated: 09/13/22 1650     proBNP 296.9 pg/mL     Narrative:      Among patients with dyspnea, NT-proBNP is highly sensitive for the detection of acute congestive heart failure. In addition NT-proBNP of <300 pg/ml effectively rules out acute congestive heart failure with 99%  negative predictive value.    Results may be falsely decreased if patient taking Biotin.      Blood Gas, Arterial - [[id number]]  (Abnormal) Collected: 09/13/22 1641    Specimen: Arterial Blood Updated: 09/13/22 1645     Site Left Radial     Hao's Test Positive     pH, Arterial 7.375 pH units      pCO2, Arterial 42.0 mm Hg      pO2, Arterial 73.4 mm Hg      HCO3, Arterial 24.6 mmol/L      Base Excess, Arterial -0.8 mmol/L      Comment: Serial Number: [id number]:  [id number]        O2 Saturation, Arterial 94.1 %      CO2 Content 25.8 mmol/L      Barometric Pressure for Blood Gas --     Comment: N/A        Modality Cannula     FIO2 48 %      Hemodilution No    Blood Culture - Blood, Arm, Right [[id number]] Collected: 09/13/22 1639    Specimen: Blood from Arm, Right Updated: 09/13/22 1644    CBC & Differential [[id number]]  (Abnormal) Collected: 09/13/22 1617    Specimen: Blood Updated: 09/13/22 1628    Narrative:      The following orders were created for panel order CBC & Differential.  Procedure                               Abnormality         Status                     ---------                               -----------         ------                     CBC Auto Differential[[id number]]        Abnormal            Final result                 Please view results for these tests on the individual orders.    CBC Auto Differential [[id number]]  (Abnormal) Collected: 09/13/22 1617    Specimen: Blood Updated: 09/13/22 1628     WBC 6.80 10*3/mm3      RBC 5.83 10*6/mm3      Hemoglobin 16.3 g/dL      Hematocrit 50.2 %      MCV 86.0 fL      MCH 28.0 pg      MCHC 32.5 g/dL      RDW 15.5 %      RDW-SD 46.8 fl      MPV 8.5 fL      Platelets 182 10*3/mm3      Neutrophil % 75.4 %      Lymphocyte % 10.6 %      Monocyte % 13.0 %      Eosinophil % 0.4 %      Basophil % 0.6 %      Neutrophils, Absolute 5.20 10*3/mm3      Lymphocytes, Absolute 0.70 10*3/mm3      Monocytes, Absolute 0.90 10*3/mm3      Eosinophils, Absolute 0.00 10*3/mm3       Basophils, Absolute 0.00 10*3/mm3      nRBC 0.1 /100 WBC     Blood Culture - Blood, Arm, Left [[id number]] Collected: 09/13/22 1617    Specimen: Blood from Arm, Left Updated: 09/13/22 1625    POC Lactate [[id number]]  (Normal) Collected: 09/13/22 1617    Specimen: Blood Updated: 09/13/22 1617    POC Lactate [[id number]]  (Normal) Collected: 09/13/22 1615    Specimen: Blood Updated: 09/13/22 1617     Lactate 1.7 mmol/L      Comment: Serial Number: [id number]:  [id number]              Pending Results: Cytology on pleural fluid    Imaging Reviewed:   CT Chest With Contrast Diagnostic    Result Date: 9/13/2022  1. 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  Mass invades the right paratracheal mediastinum with occlusion of right subclavian vein and distal right internal jugular vein. Mild narrowing of the SVC. Mass extends to the right hilum with encasement of right upper lobe bronchus and severe narrowing and near occlusion of the right upper lobe pulmonary artery. 2. Large right pleural effusion with near complete collapse of the right lower lobe. 3. Severe emphysema with multiple additional pulmonary nodules concerning for pulmonary metastatic disease. 4. Extensive coronary and aortic atherosclerotic disease. 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 6. Left adrenal gland 1.7 cm nodule may relate to adrenal metastatic disease or adenoma, new from 2014. 7. Additional incidental findings above.  I discussed the critical findings above with Dr. Mazariegos at 2:44 p.m. on 9/13/2022.   Electronically Signed By-Jayant Garcia MD On:9/13/2022 4:25 PM This report was finalized on [id number] by  Jayant Garcia MD.    XR Chest 1 View    Result Date: 9/14/2022    1.  Interval placement of small-caliber right thoracostomy tube.  No evidence pneumothorax.  There is been complete evacuation of the right-sided pleural  effusion. 2.  No change in right upper lobe mass.   Electronically Signed By-John Nelson MD On:9/14/2022 10:33 AM This report was finalized on [id number] by  John Nelson MD.           Assessment & Plan   ASSESSMENT    Right upper lobe lung mass   6.5 cm paratracheal mass, invading right paratracheal mediastinum with occlusion of the right subclavian vein and distal right internal jugular vein.  Extends to the right hilum with encasement of the right upper lobe bronchus and severe narrowing and near occlusion of the right upper lobe pulmonary artery.  Bilateral pulmonary nodules concerning for metastatic disease.  Left adrenal gland 1.7 cm nodule concerning for metastatic disease.  Remote smoking history, quit 40 years ago.    Acute respiratory failure with hypoxia\COPD   Management per primary team, pulmonary consult pending.  Ultrasound-guided right chest tube placement by pulmonologist with 1700 mL of fluid obtained         PLAN  1. IR consulted  for CT-guided biopsy of the right upper lobe lung mass  2. Await results of tissue biopsy.  If NSCLC we will plan for molecular testing and palliative radiation to the mass while in progress.  If SCLC with plan for urgent chemotherapy.    Electronically signed by ANN Ford, 09/14/22, 8:09 AM EDT.    Patient seen and examined agree with assessment and plan    89-year-old male with shortness of breath CT findings concerning for right upper lobe lung mass compressing SVC, right upper lobe pulmonary artery, affecting vasculature  Status post chest tube placement with improvement in shortness of breath and oxygen requirement.  Discussed possibilities with the patient he is wanting to pursue diagnosis and treatment.  If this is a non-small cell lung cancer we will consider palliative radiation with extent of involvement while awaiting final results of the biopsy.  If small cell lung cancer will need to start urgent chemotherapy inpatient  I will  discuss with pathology tomorrow once I have preliminary results.  Will also discussed with radiation oncology.  Patient has remote smoking history.  Important to rule out molecular drivers of disease.  We will send [employer] ration sequencing in case of non-small cell lung cancer    Thank you for this consult. We will be happy to follow along with you.       Consulting MD below provided more than 50% of the total visit time for this encounter    Electronically signed by Veronica Delgado MD, 09/14/22, 4:14 PM EDT.

## 2022-09-14 NOTE — SIGNIFICANT NOTE
Nursing report ED to floor  Deric Wylie  89 y.o.  male    HPI:   Chief Complaint   Patient presents with   • Abnormal CT       Admitting doctor:   Clifford Kinght MD    Admitting diagnosis:   The primary encounter diagnosis was Shortness of breath. Diagnoses of Hypoxia and Mass of upper lobe of right lung were also pertinent to this visit.    Code status:   Current Code Status     Date Active Code Status Order ID Comments User Context       9/13/2022 1912 CPR (Attempt to Resuscitate) [id number]  Guillermina Garcia APRN ED     Advance Care Planning Activity      Questions for Current Code Status     Question Answer    Code Status (Patient has no pulse and is not breathing) CPR (Attempt to Resuscitate)    Medical Interventions (Patient has pulse or is breathing) Full Support          Allergies:   Patient has no known allergies.    Isolation:  Enhanced Droplet/Contact      Fall Risk:  Fall Risk Assessment was completed, and patient is at moderate risk for falls.   Predictive Model Details         30 (Low) Factor Value    Calculated 9/13/2022 18:07 Age 89    Risk of Fall Model Musculoskeletal Assessment WDL     Respiratory Rate 29     Active Peripheral IV Present     Skin Assessment WDL     Magnesium not on file     Diastolic BP 62     Financial Class Medicare     Number of Distinct Medication Classes administered 3     Drug Use No     Kirill Scale not on file     Albumin 3.5 g/dL     Peripheral Vascular Assessment WDL     Total Bilirubin 0.9 mg/dL     Chloride 100 mmol/L     Sex Male     Gastrointestinal Assessment WDL     Cardiac Assessment WDL     Creatinine 0.86 mg/dL     Calcium 10.5 mg/dL     Days after Admission 0.09     Potassium 4.1 mmol/L     ALT 15 U/L         Weight:       09/13/22  1611   Weight: 72.1 kg (159 lb)       Intake and Output    Intake/Output Summary (Last 24 hours) at 9/13/2022 2031  Last data filed at 9/13/2022 1826  Gross per 24 hour   Intake 100 ml   Output --   Net 100 ml       Diet:    Dietary Orders (From admission, onward)     Start     Ordered    09/13/22 1850  Diet Regular  Diet Effective Now        Question:  Diet / Texture / Consistency  Answer:  Regular    09/13/22 1849                 Most recent vitals:   Vitals:    09/13/22 1640 09/13/22 1739 09/13/22 1742 09/13/22 1846   BP:    128/68   Pulse: 92  81 85   Resp:  (!) 29     Temp:       TempSrc:       SpO2: 98%  95% 95%   Weight:       Height:           Active LDAs/IV Access:   Lines, Drains & Airways     Active LDAs     Name Placement date Placement time Site Days    Peripheral IV 09/13/22 1525 Right Antecubital 09/13/22  1525  Antecubital  less than 1    Peripheral IV 09/13/22 1636 Left Antecubital 09/13/22  1636  Antecubital  less than 1                Skin Condition:   Skin Assessments (last day)     None           Labs (abnormal labs have a star):   Labs Reviewed   COMPREHENSIVE METABOLIC PANEL - Abnormal; Notable for the following components:       Result Value    Glucose 100 (*)     All other components within normal limits    Narrative:     GFR Normal >60  Chronic Kidney Disease <60  Kidney Failure <15     CBC WITH AUTO DIFFERENTIAL - Abnormal; Notable for the following components:    RBC 5.83 (*)     RDW 15.5 (*)     Lymphocyte % 10.6 (*)     Monocyte % 13.0 (*)     All other components within normal limits   BLOOD GAS, ARTERIAL - Abnormal; Notable for the following components:    pO2, Arterial 73.4 (*)     Base Excess, Arterial -0.8 (*)     All other components within normal limits   RESPIRATORY PANEL PCR W/ COVID-19 (SARS-COV-2) DELFINA/TERENCE/KATHRYN/PAD/COR/MAD/DANIEL IN-HOUSE, NP SWAB IN [facility], 3-4 HR TAT - Normal    Narrative:     In the setting of a positive respiratory panel with a viral infection PLUS a negative procalcitonin without other underlying concern for bacterial infection, consider observing off antibiotics or discontinuation of antibiotics and continue supportive care. If the respiratory panel is positive for atypical  bacterial infection (Bordetella pertussis, Chlamydophila pneumoniae, or Mycoplasma pneumoniae), consider antibiotic de-escalation to target atypical bacterial infection.   BNP (IN-HOUSE) - Normal    Narrative:     Among patients with dyspnea, NT-proBNP is highly sensitive for the detection of acute congestive heart failure. In addition NT-proBNP of <300 pg/ml effectively rules out acute congestive heart failure with 99% negative predictive value.    Results may be falsely decreased if patient taking Biotin.     TROPONIN (IN-HOUSE) - Normal    Narrative:     Troponin T Reference Range:  <= 0.03 ng/mL-   Negative for AMI  >0.03 ng/mL-     Abnormal for myocardial necrosis.  Clinicians would have to utilize clinical acumen, EKG, Troponin and serial changes to determine if it is an Acute Myocardial Infarction or myocardial injury due to an underlying chronic condition.       Results may be falsely decreased if patient taking Biotin.     POC LACTATE - Normal   POC LACTATE - Normal   BLOOD CULTURE   BLOOD CULTURE   BLOOD GAS, ARTERIAL   CBC AND DIFFERENTIAL    Narrative:     The following orders were created for panel order CBC & Differential.  Procedure                               Abnormality         Status                     ---------                               -----------         ------                     CBC Auto Differential[[id number]]        Abnormal            Final result                 Please view results for these tests on the individual orders.   EXTRA TUBES    Narrative:     The following orders were created for panel order Extra Tubes.  Procedure                               Abnormality         Status                     ---------                               -----------         ------                     Light Blue Top[[id number]]                                   Final result                 Please view results for these tests on the individual orders.   LIGHT BLUE TOP       LOC: Person, Place, Time  and Situation    Telemetry:  Telemetry    Cardiac Monitoring Ordered: no    EKG:   ECG 12 Lead   Preliminary Result   HEART RATE= 88  bpm   RR Interval= 684  ms   WI Interval= 166  ms   P Horizontal Axis= 2  deg   P Front Axis= 32  deg   QRSD Interval= 100  ms   QT Interval= 364  ms   QRS Axis= -89  deg   T Wave Axis= 43  deg   - ABNORMAL ECG -   Sinus rhythm   Left anterior fascicular block   When compared with ECG of 22-Feb-2020 8:44:35,   No significant change   Electronically Signed By:    Date and Time of Study: 2022-09-13 16:19:20          Medications Given in the ED:   Medications   azithromycin (ZITHROMAX) 500 mg in sodium chloride 0.9 % 250 mL IVPB-VTB (500 mg Intravenous Given 9/13/22 1854)   sodium chloride 0.9 % flush 10 mL (has no administration in time range)   sodium chloride 0.9 % flush 10 mL (has no administration in time range)   Enoxaparin Sodium (LOVENOX) syringe 40 mg (40 mg Subcutaneous Given 9/13/22 1933)   ondansetron (ZOFRAN) tablet 4 mg (has no administration in time range)     Or   ondansetron (ZOFRAN) injection 4 mg (has no administration in time range)   acetaminophen (TYLENOL) tablet 650 mg (has no administration in time range)     Or   acetaminophen (TYLENOL) 160 MG/5ML solution 650 mg (has no administration in time range)     Or   acetaminophen (TYLENOL) suppository 650 mg (has no administration in time range)   cefTRIAXone (ROCEPHIN) 1 g in sodium chloride 0.9 % 100 mL IVPB (0 g Intravenous Stopped 9/13/22 1826)       Imaging results:  CT Chest With Contrast Diagnostic    Result Date: 9/13/2022  1. 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  Mass invades the right paratracheal mediastinum with occlusion of right subclavian vein and distal right internal jugular vein. Mild narrowing of the SVC. Mass extends to the right hilum with encasement of right upper lobe bronchus and severe narrowing and near occlusion of the right upper lobe pulmonary  artery. 2. Large right pleural effusion with near complete collapse of the right lower lobe. 3. Severe emphysema with multiple additional pulmonary nodules concerning for pulmonary metastatic disease. 4. Extensive coronary and aortic atherosclerotic disease. 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 6. Left adrenal gland 1.7 cm nodule may relate to adrenal metastatic disease or adenoma, new from 2014. 7. Additional incidental findings above.  I discussed the critical findings above with Dr. Mazariegos at 2:44 p.m. on 9/13/2022.   Electronically Signed By-Jayant Garcia MD On:9/13/2022 4:25 PM This report was finalized on [id number] by  Jayant Garcia MD.      Social issues:   Social History     Socioeconomic History   • Marital status:    Tobacco Use   • Smoking status: Never Smoker   • Smokeless tobacco: Never Used   Substance and Sexual Activity   • Alcohol use: Never   • Drug use: Never   • Sexual activity: Defer       NIH Stroke Scale:  Interval: (not recorded)  1a. Level of Consciousness: (not recorded)  1b. LOC Questions: (not recorded)  1c. LOC Commands: (not recorded)  2. Best Gaze: (not recorded)  3. Visual: (not recorded)  4. Facial Palsy: (not recorded)  5a. Motor Arm, Left: (not recorded)  5b. Motor Arm, Right: (not recorded)  6a. Motor Leg, Left: (not recorded)  6b. Motor Leg, Right: (not recorded)  7. Limb Ataxia: (not recorded)  8. Sensory: (not recorded)  9. Best Language: (not recorded)  10. Dysarthria: (not recorded)  11. Extinction and Inattention (formerly Neglect): (not recorded)    Total (NIH Stroke Scale): (not recorded)     Additional notable assessment information:     Nursing report ED to floor:  MILE Rodriguez RN   09/13/22 20:31 EDT

## 2022-09-14 NOTE — PLAN OF CARE
Problem: Adult Inpatient Plan of Care  Goal: Plan of Care Review  Outcome: Ongoing, Progressing  Goal: Patient-Specific Goal (Individualized)  Outcome: Ongoing, Progressing  Goal: Absence of Hospital-Acquired Illness or Injury  Outcome: Ongoing, Progressing  Intervention: Identify and Manage Fall Risk  Recent Flowsheet Documentation  Taken 9/14/2022 1200 by Lizet Lincoln RN  Safety Promotion/Fall Prevention: safety round/check completed  Taken 9/14/2022 1000 by Lizet Lincoln RN  Safety Promotion/Fall Prevention: safety round/check completed  Taken 9/14/2022 0900 by Lizet Lincoln RN  Safety Promotion/Fall Prevention: safety round/check completed  Goal: Optimal Comfort and Wellbeing  Outcome: Ongoing, Progressing  Goal: Readiness for Transition of Care  Outcome: Ongoing, Progressing     Problem: COPD (Chronic Obstructive Pulmonary Disease) Comorbidity  Goal: Maintenance of COPD Symptom Control  Outcome: Ongoing, Progressing   Goal Outcome Evaluation:         Pt on 5L nasal canula with Spo2 is 96%. Chest tube placement today with 1900 ml drainage at this time. States SOA relieved after thoracentesis. Denies need for pain medication. States he is hungry and ready for food.

## 2022-09-14 NOTE — CASE MANAGEMENT/SOCIAL WORK
Continued Stay Note  ANDREI Brumfield     Patient Name: Deric Wylie  MRN: [id number]  Today's Date: 9/14/2022    Admit Date: 9/13/2022     Discharge Plan     Row Name 09/14/22 1645       Plan    Plan Needs screened    Plan Comments Needs cm assessment, pt off floor for procedure during CM rounds                   Expected Discharge Date and Time     Expected Discharge Date Expected Discharge Time    Sep 16, 2022             ANNIE TODD RN

## 2022-09-15 PROBLEM — C34.11 MALIGNANT NEOPLASM OF UPPER LOBE OF RIGHT LUNG: Status: ACTIVE | Noted: 2022-01-01

## 2022-09-15 NOTE — PLAN OF CARE
Problem: Adult Inpatient Plan of Care  Goal: Plan of Care Review  Outcome: Ongoing, Progressing  Flowsheets (Taken 9/15/2022 0104)  Progress: no change  Plan of Care Reviewed With: patient  Outcome Evaluation: Patient rested throughout the night, no complaints at this time.  Goal: Patient-Specific Goal (Individualized)  Outcome: Ongoing, Progressing  Goal: Absence of Hospital-Acquired Illness or Injury  Outcome: Ongoing, Progressing  Intervention: Identify and Manage Fall Risk  Recent Flowsheet Documentation  Taken 9/15/2022 0016 by Arnoldo Mcghee RN  Safety Promotion/Fall Prevention: safety round/check completed  Taken 9/14/2022 2215 by Arnoldo Mcghee RN  Safety Promotion/Fall Prevention: safety round/check completed  Taken 9/14/2022 2009 by Arnoldo Mcghee RN  Safety Promotion/Fall Prevention:   safety round/check completed   room organization consistent   nonskid shoes/slippers when out of bed   clutter free environment maintained   assistive device/personal items within reach  Intervention: Prevent Skin Injury  Recent Flowsheet Documentation  Taken 9/14/2022 2009 by Arnoldo Mcghee RN  Skin Protection:   adhesive use limited   protective footwear used   transparent dressing maintained  Intervention: Prevent and Manage VTE (Venous Thromboembolism) Risk  Recent Flowsheet Documentation  Taken 9/14/2022 2009 by Arnoldo Mcghee RN  VTE Prevention/Management:   bilateral   sequential compression devices off  Intervention: Prevent Infection  Recent Flowsheet Documentation  Taken 9/14/2022 2009 by Arnoldo Mcghee RN  Infection Prevention:   single patient room provided   rest/sleep promoted   personal protective equipment utilized   hand hygiene promoted  Goal: Optimal Comfort and Wellbeing  Outcome: Ongoing, Progressing  Intervention: Provide Person-Centered Care  Recent Flowsheet Documentation  Taken 9/14/2022 2009 by Arnoldo Mcghee RN  Trust Relationship/Rapport:   care explained   choices provided    emotional support provided   empathic listening provided   questions answered   questions encouraged   reassurance provided   thoughts/feelings acknowledged  Goal: Readiness for Transition of Care  Outcome: Ongoing, Progressing     Problem: COPD (Chronic Obstructive Pulmonary Disease) Comorbidity  Goal: Maintenance of COPD Symptom Control  Outcome: Ongoing, Progressing  Intervention: Maintain COPD-Symptom Control  Recent Flowsheet Documentation  Taken 9/15/2022 0016 by Arnoldo Mcghee, RN  Medication Review/Management: medications reviewed  Taken 9/14/2022 2215 by Arnoldo Mcghee RN  Medication Review/Management: medications reviewed  Taken 9/14/2022 2009 by Arnoldo Mcghee, RN  Supportive Measures: active listening utilized  Medication Review/Management: medications reviewed   Goal Outcome Evaluation:  Plan of Care Reviewed With: patient        Progress: no change  Outcome Evaluation: Patient rested throughout the night, no complaints at this time.

## 2022-09-15 NOTE — PROGRESS NOTES
[facility] Medicine Services Daily Progress Note    Patient Name: Deric Wylie  : 1933  MRN: [id number]  Primary Care Physician:  Bernardo Singh MD  Date of admission: 2022      Subjective      Chief Complaint:     Shortness of breath    Patient Reports       Notes and vitals reviewed  Patient has beenOn 10 L of high flow nasal cannula saturating mid 90s  Afebrile  Seen by Dr. Araya who recommended chest tube placement as well as IR guided biopsy from pleural-based lung mass  Still short of breath  White count is normal    9/15  Patient had biopsy done by IRYesterday  He had about 1900 mL out yesterday from chest tube  On 5 L nasal cannula    No growth on pleural fluid so far  Pathology shows evidence of malignant cells and necrosis intraoperatively diagnosed  Awaiting final report    ROS *  All other system reviewed and negative except as above      Objective      Vitals:   Temp:  [97.5 °F (36.4 °C)-98.1 °F (36.7 °C)] 98.1 °F (36.7 °C)  Heart Rate:  [] 97  Resp:  [14-24] 16  BP: (105-130)/(60-78) 111/64  Flow (L/min):  [5-10] 5    Physical Exam     GENERAL APPEARANCE: Well developed, well nourished, alert and cooperative, and appears to be in no acute distress.  HEAD: normocephalic.  EYES: PERRL, EOMI. vision intact grossly.  EARS: Intact hearing.  No gross abnormalities.  NOSE: No nasal discharge.  THROAT: Clear   NECK: Neck supple, non-tender without lymphadenopathy, masses or thyromegaly.  CARDIAC: Normal S1 and S2. No S3, S4 or murmurs. Rhythm is regular. There is no peripheral edema, cyanosis or pallor. Extremities are warm and well perfused. Capillary refill is less than 2 seconds. No carotid bruits.  LUNGS: Clear to auscultation and percussion without rales, rhonchi, wheezing or diminished breath sounds.  ABDOMEN: Positive bowel sounds. Soft, nondistended, nontender. No guarding or rebound. No masses.  MUSKULOSKELETAL: No deformity or swelling   BACK:  No abnormalities noted     EXTREMITIES: No significant deformity or joint abnormality. No edema. Peripheral pulses intact. No varicosities.  LOWER EXTREMITY: No edema or swelling  NEUROLOGICAL: CN II-XII intact. Strength and sensation symmetric and intact throughout. Reflexes 2+ throughout. Cerebellar testing normal.  SKIN: Skin normal color, texture and turgor with no lesions or eruptions.  PSYCHIATRIC: Alert cooperative not suicidal          Result Review    Result Review:  I have personally reviewed the results from the time of this admission to 9/15/2022 10:05 EDT and agree with these findings:  [x]  Laboratory  [x]  Microbiology  [x]  Radiology  [x]  EKG/Telemetry   []  Cardiology/Vascular   []  Pathology  []  Old records  []  Other:  Most notable findings include: **As above          Assessment & Plan      Brief Patient Summary:  Deric Wylie is a 89 y.o. male who *presents with lung mass and pleural effusion    aspirin, 81 mg, Oral, Daily  azithromycin, 500 mg, Intravenous, Q24H  enoxaparin, 40 mg, Subcutaneous, Q24H  sodium chloride, 10 mL, Intravenous, Q12H             Active Hospital Problems:  Active Hospital Problems    Diagnosis    • **Shortness of breath      Plan:     History of Present Illness: Deric Wylie is a 89 y.o. male who presented to [facility] on 9/13/2022 complaining of shortness of breath which has been worsening over the last 4 weeks.  Patient was seen by his PCP with abnormal x-ray and scheduled outpatient CT of chest which was done at this facility.  Patient was encouraged to come to the ER for further evaluations.  The patient denies subjective fevers or chills.  The patient does experience dyspnea with exertion as well as at rest.  In the emergency room the patient's oxygen saturation was in the low 80s on room air.  Patient's was placed on oxygen with titration to 80% high flow.  Patient is resting comfortably on this oxygen setting.    Assessment/plan        Acute  respiratory failure with hypoxia--likely secondary to right upper lobe mass and right pleural effusion: Oxygen support as needed; pulmonary consult; oncology consult  Pleural-based lung mass-seen by Dr. OcampoIR guided biopsy  Left pleural effusion s/p thoracocentesis by  Draw  Fluid cultures pending but negative so far  Intraoperative pathology is positive for malignancy necrosis pending final identification.          COPD, chronic: Add duo nebs as needed     Cardiovascular prophylaxis: Continue aspirin     DVT prophylaxis:  Medical DVT prophylaxis orders are present.      DVT prophylaxis:  Medical DVT prophylaxis orders are present.    CODE STATUS:    Code Status (Patient has no pulse and is not breathing): CPR (Attempt to Resuscitate)  Medical Interventions (Patient has pulse or is breathing): Full Support      Disposition:  I expect patient to be discharged   Home.    This patient has been examined wearing appropriate Personal Protective Equipment and discussed with hospital infection control department. 09/15/22      Electronically signed by Bernardo Lorenzo MD, 09/15/22, 10:05 EDT.  [facility] Brum[facility] Hospitalist Team

## 2022-09-15 NOTE — PROGRESS NOTES
Hematology oncology progress note    Patient is a 89-year-old male who presented to the hospital with shortness of breath CT findings concerning for right upper lobe mass encasing SVC, pulmonary artery.  Patient had a CT-guided biopsy on 9/14/2022 with preliminary non-small cell lung cancer.  Patient also had a chest tube placement on the right side fluid sent for cytology.    Subjective  Shortness of breath persists overall improved, still requiring 6 L via nasal cannula    Physical exam   Nasal cannula oxygen in place, right-sided chest tube in place, decreased breath sounds bilateral basilar bases    Results -CT-guided biopsy pathology consistent with malignancy with necrosis final pending      Assessment and plan    Non-small cell lung cancer  Patient has stage IV disease with pleural effusion  Complete staging we will get CT abdomen, MRI brain  I have discussed case with radiation oncology with significant shortness of breath, potential for worsening as we pursue molecular testing and the fact this is not a non-small cell lung cancer.  We can consider palliative radiation treatment to start with this will help with his shortness of breath.  In the meantime we will obtain NexGen ration sequencing with Anoop PD-L1 analysis plan on starting chemotherapy after completion of radiation.

## 2022-09-15 NOTE — CASE MANAGEMENT/SOCIAL WORK
Discharge Planning Assessment   Octaviano     Patient Name: Deric Wylie  MRN: [id number]  Today's Date: 9/15/2022    Admit Date: 9/13/2022     Discharge Needs Assessment     Row Name 09/15/22 1625       Living Environment    People in Home alone    Current Living Arrangements home    Primary Care Provided by self    Provides Primary Care For no one    Family Caregiver if Needed friend(s)    Family Caregiver Names Friend Guillermnia    Quality of Family Relationships unable to assess  no family present at time of CM rounds    Able to Return to Prior Arrangements yes       Resource/Environmental Concerns    Resource/Environmental Concerns none    Transportation Concerns none       Transition Planning    Patient/Family Anticipates Transition to home    Patient/Family Anticipated Services at Transition none    Transportation Anticipated family or friend will provide       Discharge Needs Assessment    Readmission Within the Last 30 Days no previous admission in last 30 days    Equipment Currently Used at Home none    Concerns to be Addressed discharge planning    Anticipated Changes Related to Illness none    Equipment Needed After Discharge none    Provided Post Acute Provider List? Yes    Post Acute Provider List Home Health    Delivered To Patient    Method of Delivery In person               Discharge Plan     Row Name 09/15/22 1626       Plan    Plan D/C plan: Anticipate routine home, declined HH    Patient/Family in Agreement with Plan yes    Plan Comments Met with pt at bedside. Pt lives at home alone, is IADL including driving. Friend Guillermina will transport at d/c. Denies use of DME at home. PCP and pharmacy confirmed. No financial barriers to medications. No HH agency use. CM discussed PT recommendation of HH after d/c. Pt declines HH at this time, CM left list at bedside for pt to review if he changes his mind. Barrier to d/c: Chest tube                   Expected Discharge Date and Time     Expected Discharge Date  Expected Discharge Time    Sep 17, 2022          Demographic Summary     Row Name 09/15/22 1624       General Information    Admission Type inpatient    Arrived From emergency department    Required Notices Provided Important Message from Medicare    Referral Source admission list    Reason for Consult discharge planning    Preferred Language English               Functional Status     Row Name 09/15/22 1624       Functional Status    Usual Activity Tolerance good    Current Activity Tolerance moderate       Functional Status, IADL    Medications independent    Meal Preparation independent    Housekeeping independent    Laundry independent    Shopping independent                      Patient Forms     Row Name 09/15/22 1628       Patient Forms    Important Message from Medicare ([facility]) --  [facility] 9/13              Met with patient in room wearing PPE: mask     Maintained distance greater than six feet and spent less than 15 minutes in the room.          ANNIE TODD RN

## 2022-09-15 NOTE — PLAN OF CARE
Problem: Adult Inpatient Plan of Care  Goal: Plan of Care Review  Outcome: Ongoing, Progressing  Goal: Patient-Specific Goal (Individualized)  Outcome: Ongoing, Progressing  Goal: Absence of Hospital-Acquired Illness or Injury  Outcome: Ongoing, Progressing  Intervention: Identify and Manage Fall Risk  Recent Flowsheet Documentation  Taken 9/15/2022 1400 by Lizet Lincoln RN  Safety Promotion/Fall Prevention: safety round/check completed  Taken 9/15/2022 1201 by Lizet Lincoln RN  Safety Promotion/Fall Prevention: safety round/check completed  Taken 9/15/2022 1000 by Lizet Lincoln RN  Safety Promotion/Fall Prevention: safety round/check completed  Taken 9/15/2022 0812 by Lizet Lincoln RN  Safety Promotion/Fall Prevention: safety round/check completed  Intervention: Prevent and Manage VTE (Venous Thromboembolism) Risk  Recent Flowsheet Documentation  Taken 9/15/2022 0812 by Lizet Lincoln RN  VTE Prevention/Management:  • compression stockings off  • sequential compression devices off  Range of Motion: active ROM (range of motion) encouraged  Intervention: Prevent Infection  Recent Flowsheet Documentation  Taken 9/15/2022 0812 by Lizet Lincoln RN  Infection Prevention:  • hand hygiene promoted  • single patient room provided  Goal: Optimal Comfort and Wellbeing  Outcome: Ongoing, Progressing  Goal: Readiness for Transition of Care  Outcome: Ongoing, Progressing     Problem: COPD (Chronic Obstructive Pulmonary Disease) Comorbidity  Goal: Maintenance of COPD Symptom Control  Outcome: Ongoing, Progressing   Goal Outcome Evaluation:         Patient had radiation and MRI today. Resting comfortably now. No complaints of pain. Pt is on 7 L high flow nasal canula.

## 2022-09-15 NOTE — PLAN OF CARE
"Goal Outcome Evaluation:     Bed mobility - Mod-A for rolling left to right to left,  Moderate assist for supine to sit to supine.  Dependent for scooting up in the bed.   Transfers - Max-A, Assist x 2 and with rolling walker sit to stand.  Pt unable to come to full standing.  Ambulation - N/A or Not attempted.     Moderate Intensity Therapy recommended post-acute care. This is recommended as therapy feels the patient would require 3-4 days per week and wouldn't tolerate \"3 hour daily\" rehab intensity. SNF would be the preferred choice. If the patient does not agree to SNF, arrange HH or OP depending on home bound status. If patient is medically complex, consider LTACH.. Pt requires no DME at discharge.     Pt's family wants  Home with family assist and and Home Health at discharge. Pt cooperative; agreeable to therapeutic recommendations and plan of care.         "

## 2022-09-15 NOTE — THERAPY TREATMENT NOTE
"Subjective: Pt agreeable to therapeutic plan of care.    Objective:     Bed mobility - Mod-A for rolling left to right to left,  Moderate assist for supine to sit to supine.  Dependent for scooting up in the bed.   Transfers - Max-A, Assist x 2 and with rolling walker sit to stand.  Pt unable to come to full standing.  Ambulation - N/A or Not attempted.    Vitals: WNL    Pain: 0 VAS  Education: Provided education on importance of mobility and skilled verbal / tactile cueing throughout intervention.     Assessment: Deric Wylie presents with functional mobility impairments which indicate the need for skilled intervention. Pt lethargic today and required a significant increased assist.  Pt's daughter reported he had dialysis this morning and had pain meds recently. Will change recommendation to SNF rehab as I dont know if his family will be able to care for him safely. Tolerating session today without incident. Will continue to follow and progress as tolerated.     Plan/Recommendations:   Moderate Intensity Therapy recommended post-acute care. This is recommended as therapy feels the patient would require 3-4 days per week and wouldn't tolerate \"3 hour daily\" rehab intensity. SNF would be the preferred choice. If the patient does not agree to SNF, arrange HH or OP depending on home bound status. If patient is medically complex, consider LTACH.. Pt requires no DME at discharge.     Pt's family wants  Home with family assist and and Home Health at discharge. Pt cooperative; agreeable to therapeutic recommendations and plan of care.     Basic Mobility 6-click:  Rollin = Total, A lot = 2, A little = 3; 4 = None  Supine>Sit:   1 = Total, A lot = 2, A little = 3; 4 = None   Sit>Stand with arms:  1 = Total, A lot = 2, A little = 3; 4 = None  Bed>Chair:   1 = Total, A lot = 2, A little = 3; 4 = None  Ambulate in room:  1 = Total, A lot = 2, A little = 3; 4 = None  3-5 Steps with railin = Total, A lot = 2, A " little = 3; 4 = None  Score: 9    Modified Power: 4 = Moderately severe disability (Unable to attend to own bodily needs without assistance, and unable to walk unassisted)     Post-Tx Position: Supine with HOB Elevated, Alarms activated and Call light and personal items within reach  PPE: mask, gloves, eye protection and gown

## 2022-09-15 NOTE — PROGRESS NOTES
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     Large right pleural effusion:  9/14/2022 bedside chest tube placement  -Neutrophils 21, lymphocytes 74, monocytes 3, nucleated cells 1041, pH 7.50, glucose 111, , protein 3.7     Chronic obstructive pulmonary disease  Severe emphysema     Recommendations:     Chest tube management, -20 cm H2O  - Monitor cytology on pleural cultures     9/14/2022 CT-guided biopsy right upper lobe lung mass pleural-based completed by IR  - Monitor cytology results     titrate oxygen, currently requiring 7 L per NC  -Was requiring 10 L per high flow     Antibiotic azithromycin  DVT prophylaxis Lovenox          LOS: 2 days     Subjective         Objective     Vital signs for last 24 hours:  Vitals:    09/14/22 1615 09/14/22 2126 09/15/22 0052 09/15/22 0337   BP: 112/68 118/61 106/61 115/65   BP Location: Right arm Right arm Right arm Right arm   Patient Position: Lying Lying Lying Lying   Pulse: 102 91 84 84   Resp:  16 16 16   Temp: 98.1 °F (36.7 °C) 97.5 °F (36.4 °C) 97.5 °F (36.4 °C) 97.8 °F (36.6 °C)   TempSrc: Oral Oral Oral Oral   SpO2: 94% 94% 93% 94%   Weight:       Height:           Intake/Output last 3 shifts:  I/O last 3 completed shifts:  In: 970 [P.O.:720; IV Piggyback:250]  Out: 2800 [Urine:900; Chest Tube:1900]  Intake/Output this shift:  No intake/output data recorded.      Radiology  Imaging Results (Last 24 Hours)     Procedure Component Value Units Date/Time    XR Chest 1 View [[id number]] Resulted: 09/15/22 0455     Updated: 09/15/22 0457    CT Needle Biopsy Lung [[id number]] Collected: 09/14/22 1344     Updated: 09/14/22 1411    Narrative:      DATE OF EXAM:  9/14/2022 12:12 PM     PROCEDURE:  CT NEEDLE  BIOPSY LUNG-     INDICATIONS:  89-year-old male with a large anteromedially located right lung mass  with mediastinal involvement, along with a large pleural fluid  collection for which the patient underwent chest tube placement  recently. CT-guided biopsy of the soft tissue masses requested.     COMPARISON:  CT chest, 09/13/2022.     FLUOROSCOPIC TIME:  50.8 seconds     PHYSICIAN MONITORED CONSCIOUS SEDATION TIME:  15 minutes     CONTRAST MEDIA:  None     TECHNIQUE:   The patient's medications were reviewed prior to the procedure.  Following a detailed explanation of the entire procedure's risks and  benefits, patient understood and written informed consent was provided.  He was placed onto the CT table in the supine position with a timeout  performed. His anterior medial superior right chest region and soft  tissue mass were localized with CT imaging and the skin site marked and  prepped and draped using maximum sterile barrier technique. After  obtaining adequate local anesthesia using 1% lidocaine, the mass was  cannulated using a 17-gauge guiding needle and streak CT guidance.  Multiple 18-gauge core biopsy specimens were obtained from the lesion  and submitted for evaluation. The guiding needle was then removed. A  sterile dressing was placed externally. He tolerated the entire  procedure rather well and there were no immediate complications. His  vital signs and pulse oximetry were monitored throughout the procedure.     FINDINGS: CT images do demonstrate appropriate positioning of the  guiding needle during the biopsy procedure. Postbiopsy, there was no  evidence of biopsy related hemorrhage or hematoma. No aerated lung was  crossed during the biopsy procedure. No pneumothorax is identified.  Significant underlying chronic lung disease is appreciated.       Impression:      Technically successful CT-guided 18-gauge core biopsy, anteromedially  located right upper lobe lung mass, as described above.      Electronically Signed By-Henrietta Le MD On:9/14/2022 2:09 PM  This report was finalized on [id number] by  Henrietta Le MD.    XR Chest 1 View [[id number]] Collected: 09/14/22 1031     Updated: 09/14/22 1035    Narrative:      XR CHEST 1 VW-     Date of Exam: 9/14/2022 9:59 AM     Indication: Thoracentesis; R06.02-Shortness of breath; R09.02-Hypoxemia;  R91.8-Other nonspecific abnormal finding of lung field.     Comparison: 8/29/2022     Technique: A single view of the chest was obtained.     FINDINGS:      Heart size is at the upper limits of normal.     Pulmonary Vessels are normal.  There is persistent right upper lobe mass  which is unchanged from the prior exam.  There has been interval  placement of small-caliber right thoracostomy tube projecting over the  right lung base.  There is been complete evacuation of previously  demonstrated right pleural effusion.  No evidence of pneumothorax.  Left  lung is clear.  No pleural effusion or pneumothorax.             Impression:            1.  Interval placement of small-caliber right thoracostomy tube.  No  evidence pneumothorax.  There is been complete evacuation of the  right-sided pleural effusion.  2.  No change in right upper lobe mass.        Electronically Signed By-John Nelson MD On:9/14/2022 10:33 AM  This report was finalized on [id number] by  John Nelson MD.          Labs:  Results from last 7 days   Lab Units 09/13/22  2340   WBC 10*3/mm3 8.40   HEMOGLOBIN g/dL 14.6   HEMATOCRIT % 44.4   PLATELETS 10*3/mm3 166     Results from last 7 days   Lab Units 09/13/22  2340 09/13/22  1617   SODIUM mmol/L 138 137   POTASSIUM mmol/L 3.9 4.1   CHLORIDE mmol/L 101 100   CO2 mmol/L 26.0 26.0   BUN mg/dL 13 15   CREATININE mg/dL 0.79 0.86   CALCIUM mg/dL 10.2 10.5   BILIRUBIN mg/dL  --  0.9   ALK PHOS U/L  --  107   ALT (SGPT) U/L  --  15   AST (SGOT) U/L  --  18   GLUCOSE mg/dL 166* 100*     Results from last 7 days   Lab Units 09/13/22  1641   PH,  ARTERIAL pH units 7.375   PO2 ART mm Hg 73.4*   PCO2, ARTERIAL mm Hg 42.0   HCO3 ART mmol/L 24.6     Results from last 7 days   Lab Units 09/13/22  1617   ALBUMIN g/dL 3.50     Results from last 7 days   Lab Units 09/13/22  1617   TROPONIN T ng/mL <0.010             Results from last 7 days   Lab Units 09/14/22  0743   INR  1.05               Meds:   SCHEDULE  azithromycin, 500 mg, Intravenous, Q24H  enoxaparin, 40 mg, Subcutaneous, Q24H  sodium chloride, 10 mL, Intravenous, Q12H      Infusions     PRNs  •  acetaminophen **OR** acetaminophen **OR** acetaminophen  •  ipratropium-albuterol  •  ondansetron **OR** ondansetron  •  sodium chloride    Physical Exam:  Physical Exam  Cardiovascular:      Heart sounds: Murmur heard.   Pulmonary:      Breath sounds: Rales present.         ROS  Review of Systems    I have reviewed the patient's new clinical results.    Electronically signed by ANN Bentley.

## 2022-09-15 NOTE — CONSULTS
RADIATION ONCOLOGY  CONSULT NOTE    NAME: Deric Wylie  YOB: 1933  MRN #: [id number]  DATE OF SERVICE: 9/15/2022  REFERRING PROVIDER:   Bernardo Singh MD  [address] [place] [address]  IN [facility]  PRIMARY CARE PROVIDER: Bernardo Singh MD    REASON FOR CONSULTATION:    NSCLC - Right Upper Lung    Cancer Staging  Staging Ongoing  Likely Stage IV with probable malignant right pleural effusion, possible left adrenal met, bilateral pulmonary nodules      HISTORY OF PRESENT ILLNESS:    Deric Wylie is a 89 y.o. 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   Initially presented to the emergency department after he was seen by his primary care physician due to an approximate month-long history of worsening dyspnea  He had no hemoptysis, voice changes, pleuritic chest pain  He had no swelling of his neck, face, or right upper extremity  Denies headaches, nausea/vomiting, visual changes, focal neurologic changes  No new bone pains  No recent weight loss  Patient remains completely functional and independent at home    Chest x-ray 8/29/2022-  Masslike opacity in the right upper lobe suspicious for neoplasm measuring up to 9 cm, moderate right pleural effusion    CT chest-9/13/2022  6.5 cm right upper lung paratracheal mass encasing the SVC with approximate 50% luminal narrowing, occlusion of right subclavian and right internal jugular vein with multiple apparent venous collaterals in the right chest and neck, complete encasement with severe narrowing of the right upper lobe pulmonary artery with near occlusion, narrowing of the right upper lobe bronchus, multiple other bilateral small pulmonary nodules, enlargement of left adrenal gland measuring 17 mm new from comparison study in 2014, low-density lesion of right hepatic lobe too small to characterize measuring 9 mm but stable from prior studies    Chest tube placed with drainage of 1700 mL of cloudy  serosanguineous fluid  Fluid cytology pending    CT-guided needle biopsy-9/14/2022  Consultation P    TP #1: Lung, right upper lobe, immediate evaluation touch prep:    Highly suspicious for malignancy with necrosis.      TP #2: Lung, right upper lobe, immediate evaluation touch prep:    Positive for malignancy with necrosis.        On presentation patient was placed on 10 L supplemental oxygen  After chest tube placement with drainage of pleural fluid he was reduced to 7 L and now is stable on 5 L supplemental O2  Patient states he is feeling much better and breathing is markedly improved  Chest tube still placed to suction      The following portions of the patient's history were reviewed and updated as appropriate: allergies, current medications, past family history, past medical history, past social history, past surgical history and problem list. Reviewed with the patient and remain unchanged.      PREVIOUS RADIOTHERAPY OR CHEMOTHERAPY: None  HISTORY OF AUTOIMMUNE DISEASE: No  PACEMAKER: No    PAST MEDICAL HISTORY:    he  has a past medical history of COPD (chronic obstructive pulmonary disease) (HCC).    MEDICATIONS:     Current Facility-Administered Medications:   •  acetaminophen (TYLENOL) tablet 650 mg, 650 mg, Oral, Q4H PRN, 650 mg at 09/14/22 1612 **OR** acetaminophen (TYLENOL) 160 MG/5ML solution 650 mg, 650 mg, Oral, Q4H PRN **OR** acetaminophen (TYLENOL) suppository 650 mg, 650 mg, Rectal, Q4H PRN, Guillermina Garcia APRN  •  aspirin chewable tablet 81 mg, 81 mg, Oral, Daily, Bernardo Lorenzo MD  •  azithromycin (ZITHROMAX) 500 mg in sodium chloride 0.9 % 250 mL IVPB-VTB, 500 mg, Intravenous, Q24H, Anais Julien APRN, 500 mg at 09/14/22 1614  •  Enoxaparin Sodium (LOVENOX) syringe 40 mg, 40 mg, Subcutaneous, Q24H, Guillermina Garcia APRN, 40 mg at 09/14/22 1613  •  ipratropium-albuterol (DUO-NEB) nebulizer solution 3 mL, 3 mL, Nebulization, Q4H PRN, Guillermina Garcia APRN  •  ondansetron (ZOFRAN)  tablet 4 mg, 4 mg, Oral, Q6H PRN **OR** ondansetron (ZOFRAN) injection 4 mg, 4 mg, Intravenous, Q6H PRN, Guillermina Garcia APRN  •  sodium chloride 0.9 % flush 10 mL, 10 mL, Intravenous, Q12H, Guillermina Garcia APRN, 10 mL at 09/15/22 0908  •  sodium chloride 0.9 % flush 10 mL, 10 mL, Intravenous, PRN, Guillermina Garcia APRN    ALLERGIES:   No Known Allergies    PAST SURGICAL HISTORY:   he has a past surgical history that includes AAA repair, open and Appendectomy.    FAMILY HISTORY:   his family history includes Cancer in his brother; Heart disease in his father and mother.    SOCIAL HISTORY:   he  reports that he has never smoked. He has never used smokeless tobacco. He reports that he does not drink alcohol and does not use drugs.    REVIEW OF SYSTEMS:   Review of Systems   Pertinent positive/negatives as in HPI  The remainder of the 14 point ROS has been reviewed and is otherwise negative.    Objective     KPS: 80:  Normal activity with effort; some signs or symptoms    VITAL SIGNS:  Vitals:    09/15/22 0052 09/15/22 0337 09/15/22 0800 09/15/22 1002   BP: 106/61 115/65 111/64    BP Location: Right arm Right arm Right arm    Patient Position: Lying Lying Lying    Pulse: 84 84 97    Resp: 16 16 16    Temp: 97.5 °F (36.4 °C) 97.8 °F (36.6 °C) 98.1 °F (36.7 °C)    TempSrc: Oral Oral Oral    SpO2: 93% 94% 100% 95%   Weight:       Height:           PHYSICAL EXAM:   GENERAL:  A&O x3, NAD    HEENT:  NC/AT. Pupils equally round. Sclera anicteric.    NECK:  Supple, no masses.  LYMPHATIC:  No cervical, supraclavicular LAD   CARDIOVASCULAR:  nl S1 & S2, RRR, no m/r/g  CHEST: Mild wheezing in bilateral lung fields, chest tube noted and covered with dressing in right posterolateral chest wall  ABDOMEN:  soft, NTND, (+) bowel sounds, no HSM   MUSCULOSKELETAL:  No spinal or other bony tenderness to firm palpation  EXTREMITIES:  No clubbing, cyanosis, edema.  SKIN:  No erythema, rashes, ulcerations noted.   NEUROLOGIC:  Cranial nerves II-XII  grossly intact bilaterally. No focal neurologic deficits.  PSYCHIATRIC:  nl mood, affect, judgement      LABS (Reviewed):  HEMATOLOGY:  WBC   Date Value Ref Range Status   09/13/2022 8.40 3.40 - 10.80 10*3/mm3 Final     RBC   Date Value Ref Range Status   09/13/2022 5.27 4.14 - 5.80 10*6/mm3 Final     Hemoglobin   Date Value Ref Range Status   09/13/2022 14.6 13.0 - 17.7 g/dL Final     Hematocrit   Date Value Ref Range Status   09/13/2022 44.4 37.5 - 51.0 % Final     Platelets   Date Value Ref Range Status   09/13/2022 166 140 - 450 10*3/mm3 Final     CHEMISTRY:  Lab Results   Component Value Date    GLUCOSE 166 (H) 09/13/2022    BUN 13 09/13/2022    CREATININE 0.79 09/13/2022    EGFRIFNONA 76 09/29/2021    BCR 16.5 09/13/2022    K 3.9 09/13/2022    CO2 26.0 09/13/2022    CALCIUM 10.2 09/13/2022    ALBUMIN 3.50 09/13/2022    AST 18 09/13/2022    ALT 15 09/13/2022         IMAGING (Reviewed):   CT Chest With Contrast Diagnostic    Result Date: 9/13/2022  Narrative:  DATE OF EXAM: 9/13/2022 3:15 PM  PROCEDURE: CT CHEST W CONTRAST DIAGNOSTIC-  INDICATIONS: dyspenia; R06.00-Dyspnea, unspecified  COMPARISON: PA and lateral chest radiograph 08/29/2022  TECHNIQUE: Routine transaxial slices were obtained through the chest after the intravenous administration of Isovue 370. Reconstructed coronal and sagittal images were also obtained. Automated exposure control and iterative construction methods were used.  FINDINGS: Within the medial right upper lobe there is a large malignant mass which measures 6.1 x 4.9 x 6.5 cm consistent with pulmonary malignancy. This partially encases the superior vena cava on image 35 with approximately 50% luminal narrowing. The mass causes occlusion of right subclavian vein and right internal jugular vein with multiple prominent venous collaterals in the right upper chest and neck. There is extension into right paratracheal mediastinum and right hilum. There is complete encasement severe narrowing  of the right upper lobe pulmonary artery with near occlusion. The mass encases the right upper lobe bronchus with luminal narrowing on image 37. Negative for subcarinal and left hilar adenopathy. No axillary adenopathy. Streak artifact from contrast partially limits assessment for supraclavicular adenopathy.  Heart size is normal. Extensive coronary artery calcifications. Negative for pulmonary embolus. The aortic branch vasculature is patent.  There is a large right-sided pleural effusion resulting in near complete collapse of the right lower lobe with compressive atelectasis. No effusion on the left. There is atelectasis at the posterior right upper lobe. Severe emphysema.  There are several bilateral small pulmonary nodules for example in the right upper lobe measuring 4 mm on image 36, right middle lobe measuring 6 mm on image 55. Left upper lobe nodule measures 5 mm on image 24. Nodule in the superior segment left lower lobe abutting the fissure measures 11 mm on image 32. Additional left upper lobe nodule measures 5 mm on image 34, 6 mm on image 48, and 8 mm on image 56. Several other smaller nodules noted.  Upper abdomen demonstrates normal visualized portions of the spleen and right adrenal gland. Left adrenal gland nodule which could relate to adenoma or metastatic disease measuring 17 mm on image 103, new from 2014. Pancreas without pancreatitis. Low-density lesion in right hepatic lobe too small to characterize measuring 9 mm on image 94 but stable from prior study suggesting cyst or hemangioma. Additional right hepatic 8 mm lesion on image 111 also stable. Negative for hydronephrosis. Fluid-filled distention of te stomach. No pericholecystic inflammation. Cholelithiasis. Extensive upper abdominal aortic atherosclerotic calcification. Partially imaged aneurysmal dilatation of the infrarenal aorta measuring 5.1 cm similar to prior exam. No aggressive osseous lesion.      Impression: 1. Right upper lobe 6.5  cm paratracheal mass consistent with pulmonary malignancy.  Mass invades the right paratracheal mediastinum with occlusion of right subclavian vein and distal right internal jugular vein. Mild narrowing of the SVC. Mass extends to the right hilum with encasement of right upper lobe bronchus and severe narrowing and near occlusion of the right upper lobe pulmonary artery. 2. Large right pleural effusion with near complete collapse of the right lower lobe. 3. Severe emphysema with multiple additional pulmonary nodules concerning for pulmonary metastatic disease. 4. Extensive coronary and aortic atherosclerotic disease. 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 6. Left adrenal gland 1.7 cm nodule may relate to adrenal metastatic disease or adenoma, new from 2014. 7. Additional incidental findings above.  I discussed the critical findings above with Dr. Mazariegos at 2:44 p.m. on 9/13/2022.   Electronically Signed By-Jayant Garcia MD On:9/13/2022 4:25 PM This report was finalized on [id number] by  Jayant Garcia MD.    XR Chest 1 View    Result Date: 9/15/2022  Narrative: EXAMINATION: XR CHEST 1 VW-  DATE OF EXAM: 9/15/2022 4:34 AM  INDICATION: chest tube; R06.02-Shortness of breath; R09.02-Hypoxemia; R91.8-Other nonspecific abnormal finding of lung field.  COMPARISON: Chest radiograph dated 9/14/2022  TECHNIQUE: Portable AP view of the chest was obtained.  FINDINGS: There is a smallbore right basilar chest tube which appears in unchanged position. There is no visible right-sided pneumothorax. There is a trace right-sided pleural effusion. There is elevation of the right hemidiaphragm. There is unchanged masslike opacity within the right apex. The cardiac silhouette is unchanged. There is aortic arch atherosclerotic calcification. Pulmonary vascularity appears normal. There are chronic coarsened interstitial markings likely related to chronic  interstitial lung disease. There are degenerative changes of the thoracic spine.      Impression: 1. Right-sided smallbore chest tube in unchanged position within the basilar aspect of the right hemithorax. No pneumothorax. 2. Unchanged masslike opacity within the right apex with elevation of the right hemidiaphragm.  Electronically Signed By-Jc Nichols MD On:9/15/2022 8:15 AM This report was finalized on [id number] by  Jc Nichols MD.    XR Chest 1 View    Result Date: 9/14/2022  Narrative: XR CHEST 1 VW-  Date of Exam: 9/14/2022 9:59 AM  Indication: Thoracentesis; R06.02-Shortness of breath; R09.02-Hypoxemia; R91.8-Other nonspecific abnormal finding of lung field.  Comparison: 8/29/2022  Technique: A single view of the chest was obtained.  FINDINGS:   Heart size is at the upper limits of normal.  Pulmonary Vessels are normal.  There is persistent right upper lobe mass which is unchanged from the prior exam.  There has been interval placement of small-caliber right thoracostomy tube projecting over the right lung base.  There is been complete evacuation of previously demonstrated right pleural effusion.  No evidence of pneumothorax.  Left lung is clear.  No pleural effusion or pneumothorax.        Impression:   1.  Interval placement of small-caliber right thoracostomy tube.  No evidence pneumothorax.  There is been complete evacuation of the right-sided pleural effusion. 2.  No change in right upper lobe mass.   Electronically Signed By-John Nelson MD On:9/14/2022 10:33 AM This report was finalized on [id number] by  John Nelson MD.    CT Needle Biopsy Lung    Result Date: 9/14/2022  Narrative: DATE OF EXAM: 9/14/2022 12:12 PM  PROCEDURE: CT NEEDLE BIOPSY LUNG-  INDICATIONS: 89-year-old male with a large anteromedially located right lung mass with mediastinal involvement, along with a large pleural fluid collection for which the patient underwent chest tube placement recently. CT-guided  biopsy of the soft tissue masses requested.  COMPARISON: CT chest, 09/13/2022.  FLUOROSCOPIC TIME: 50.8 seconds  PHYSICIAN MONITORED CONSCIOUS SEDATION TIME: 15 minutes  CONTRAST MEDIA: None  TECHNIQUE: The patient's medications were reviewed prior to the procedure. Following a detailed explanation of the entire procedure's risks and benefits, patient understood and written informed consent was provided. He was placed onto the CT table in the supine position with a timeout performed. His anterior medial superior right chest region and soft tissue mass were localized with CT imaging and the skin site marked and prepped and draped using maximum sterile barrier technique. After obtaining adequate local anesthesia using 1% lidocaine, the mass was cannulated using a 17-gauge guiding needle and streak CT guidance. Multiple 18-gauge core biopsy specimens were obtained from the lesion and submitted for evaluation. The guiding needle was then removed. A sterile dressing was placed externally. He tolerated the entire procedure rather well and there were no immediate complications. His vital signs and pulse oximetry were monitored throughout the procedure.  FINDINGS: CT images do demonstrate appropriate positioning of the guiding needle during the biopsy procedure. Postbiopsy, there was no evidence of biopsy related hemorrhage or hematoma. No aerated lung was crossed during the biopsy procedure. No pneumothorax is identified. Significant underlying chronic lung disease is appreciated.      Impression: Technically successful CT-guided 18-gauge core biopsy, anteromedially located right upper lobe lung mass, as described above.  Electronically Signed By-Henrietta eL MD On:9/14/2022 2:09 PM This report was finalized on [id number] by  Henrietta Le MD.    XR Chest PA & Lateral    Result Date: 8/29/2022  Narrative: DATE OF EXAM: 8/29/2022 5:19 PM  PROCEDURE: XR CHEST PA AND LATERAL-  INDICATIONS: ; R06.00-Dyspnea,  unspecified  COMPARISON: Chest x-ray 02/22/2020  TECHNIQUE: Two radiologic views of the chest.  FINDINGS: There is masslike opacity in the medial right upper lobe measuring up to 9 cm. There is a moderate size right pleural effusion. There is also right lower lobe airspace opacity. There is scarring left upper lobe. There is emphysema. No pneumothorax.      Impression: Masslike opacity right upper lobe suspicious for neoplasm. Moderate size right pleural effusion with consolidation right lower lobe. CT chest with contrast is recommended for better evaluation.  Electronically Signed By-Vilma Moore MD On:8/29/2022 5:21 PM This report was finalized on [id number] by  Vilma Moore MD.    I have personally reviewed and interpretted the relevant radiographic images.  Personal interpretation of images as in HPI      Assessment & Plan     ASSESSMENT AND PLAN:    89-year-old man with newly diagnosed non-small cell lung carcinoma  Work-up and staging ongoing  I think this most likely represents stage IV disease with malignant pleural effusion, multiple pulmonary nodules bilaterally, and possible left adrenal metastasis    I did discuss with the patient today palliative radiation therapy options including treatment of the right upper lobe mass  I do not think his current episodes of shortness of breath and dyspnea are related to this mass or compression of the vasculature as his symptoms have markedly improved after the effusion was drained, however they could cause problems without treatment in the near future    I explained the rationale for delivering palliative radiation therapy along with the logistics of planning and delivering radiation treatments along with anticipated potential acute and chronic toxicities including but not limited to: Fatigue, skin erythema, desquamation, radiation pneumonitis, radiation fibrosis, bronchial stricture, atelectasis, radiation esophagitis, esophageal stricture, pericarditis,  pericardial effusion, spinal cord myelopathy, intercostal neuropathy with chronic pain, rib fracture, risk of secondary malignancy.    Questions answered to patient's satisfaction and they wish to proceed as planned.  CT simulation scheduled in near future to begin planning process.  Treatment can be initiated once the patient is stable enough for transportation and off of chest tube suction    Thank-you for allowing me to participate in the care of this patient and please do not hesitate to contact me for any questions or concerns.    This assessment comes from my review of the imaging, pathology, physician notes and other pertinent information as mentioned.      PLAN/ORDERS:  CT simulation  Radiation treatment planning       COORDINATION OF CARE: A copy of this note is sent to the referring provider.    SMOKING CESSATION: Smoking 40 years ago    PAIN AND PAIN MANAGEMENT: Inpatient pain management    MEDICAL DECISION MAKING:    Number/Complexity of Problems Addressed  [] 1 self-limited of minor problem (99202/27701)  [] >=2 self-limited or minor problems, 1 stable chronic illness, 1 acute/uncomplicated illness/injury (99203/56143)  []Chronic illnesses with exacerbation/progression/side effects of treatment, >=2 stable chronic illnesses, 1 undiagnosed new problem with uncertain prognosis, 1 acute illness with systemic symptoms, 1 acute complicated injury (99204/80092)  [x]Chronic illnesses with severe exacerbation/progression/treatment side effects, acute or chronic illness or injury that poses a threat to life or bodily function (27863/34861)    Amount and/or Complexity of Data Reviewed  [] Minimal or none (99202/76859)  [] Limited - meets 1/2 categories (19579/84041)  1. At least 2 of: review of prior external notes from each unique source, review results of each unique test, ordering of each unique test  2. Assessment requiring an independent historian  [] Moderate - meets 1/3 categories (95806/54636)  1. Any 3  of: Review of prior external notes from each unique source, review results of each unique test, ordering of each unique test, assessment requiring an independent historian  2. Independent interpretation of tests  3. Discussion of management or test interpretation  [x] Extensive - meets 2/3 categories (15619/48471)  1. Any 3 of: Review of prior external notes from each unique source, review results of each unique test, ordering of each unique test, assessment requiring an independent historian  2. Independent interpretation of tests  3. Discussion of management or test interpretation    Risk of complications and/or morbidity/mortality of patient management  [] Minimal (92394/81117)  [] Low (53994/73567)  [] Moderate (24541/26352)  Examples: Rx drug management, decision regarding minor surgery with identified patient or procedure risk factors, decision regarding elective major surgery without identified patient or procedure risk factors, diagnosis or treatment significantly limited by social determinants of health  [x] High (99205/74682)  Examples: drug therapy requiring intensive monitoring for toxicity, decision regarding elective major surgery with identified patient/procedure risk factors, decision regarding emergency major surgery, decision regarding hospitalization, decision for DNR or de-escalation of care because of poor prognosis    LEVEL OF MDM (based on 2/3 above categories)  [] Straightforward (71834/68423)  [] Low (55123/01125)  [] Moderate (99918/99799)  [x] High (25531/86938)      CC: Bernardo Singh,* Bernardo Singh MD Robert Bryan Barriger, MD  9/15/2022  10:47 AM EDT

## 2022-09-16 NOTE — PROGRESS NOTES
Hematology oncology progress note    Patient is a 89-year-old male who presented to the hospital with shortness of breath CT findings concerning for right upper lobe mass encasing SVC, pulmonary artery.  Patient had a CT-guided biopsy on 9/14/2022 consistent with invasive moderately differentiated squamous cell carcinoma.  Patient also had a chest tube placement on the right side fluid sent for cytology consistent with malignant effusion.    Subjective  Shortness of breath persistent, requiring 6 lit NC    Physical exam   Right-sided chest tube in place, decreased bibasilar basilar breath sounds    Results -pathology results consistent with invasive moderately differentiated squamous cell carcinoma      Assessment and plan    Non-small cell lung cancer  Patient has stage IV disease with pleural effusion  Complete staging we obtained CT abdomen, MRI brain, CT abdomen showing left adrenal nodule, infrarenal abdominal aortic aneurysm 5.8 cm.  Consider vascular consult.  MRI brain was negative  I have discussed case with radiation oncology with significant shortness of breath, potential for worsening as we pursue molecular testing and the fact this is not a non-small cell lung cancer.  We can consider palliative radiation treatment to start with this will help with his shortness of breath, pain associated with the tumor  In the meantime we will obtain [employer] ration sequencing with Anoop PD-L1 analysis plan on starting chemotherapy after completion of radiation.    Infrarenal abdominal aortic aneurysm  Vascular surgery outpatient consultation.

## 2022-09-16 NOTE — PROGRESS NOTES
[facility]     Progress Note    Patient Name: Deric Wylie  : 1933  MRN: [id number]  Primary Care Physician:  Bernardo Singh MD  Date of admission: 2022  Service date and time: 22 17:02 EDT  Subjective   Subjective     Chief Complaint: SOB    HPI:  Patient Reports feels much better after removal of fluid yesterday      Objective   Objective     Vitals:   Temp:  [97.4 °F (36.3 °C)-98.1 °F (36.7 °C)] 98.1 °F (36.7 °C)  Heart Rate:  [] 95  Resp:  [16-17] 17  BP: (112-129)/(64-75) 129/73  Flow (L/min):  [5-6] 6  Physical Exam    Constitutional: Awake, alert   Eyes: PERRLA, sclerae anicteric, no conjunctival injection   HENT: NCAT, mucous membranes moist   Neck: Supple, no thyromegaly, no lymphadenopathy, trachea midline   Respiratory: Clear to auscultation bilaterally, nonlabored respirations, CT in place   Cardiovascular: RRR, no murmurs, rubs, or gallops, palpable pedal pulses bilaterally   Gastrointestinal: Positive bowel sounds, soft, nontender, nondistended   Musculoskeletal: No bilateral ankle edema, no clubbing or cyanosis to extremities   Psychiatric: Appropriate affect, cooperative   Neurologic: Oriented x 3, strength symmetric in all extremities, Cranial Nerves grossly intact to confrontation, speech clear   Skin: No rashes     Result Review    Result Review:  I have personally reviewed the results from the time of this admission to 2022 17:02 EDT and agree with these findings:  [x]  Laboratory list / accordion  []  Microbiology  [x]  Radiology  [x]  EKG/Telemetry   []  Cardiology/Vascular   []  Pathology  []  Old records  []  Other:        Assessment & Plan   Assessment / Plan       Active Hospital Problems:  Active Hospital Problems    Diagnosis    • **Shortness of breath    • Malignant neoplasm of upper lobe of right lung (HCC)    Acute hypoxic respiratory failure  Right pleural effusion s/p CT tube    Plan:    - pulmonary following, managing CT  - oncology and  rad/onc following and plans for radiation once CT has been removed  - wean oxygen, supportive care  - trend labs, pain control  - cont home meds as able  - PT/OT    DVT prophylaxis:  Medical DVT prophylaxis orders are present.    CODE STATUS:   Code Status (Patient has no pulse and is not breathing): CPR (Attempt to Resuscitate)  Medical Interventions (Patient has pulse or is breathing): Full Support    Disposition:  I expect patient to be discharged 4-5 days  Chavo Beard MD

## 2022-09-16 NOTE — PROGRESS NOTES
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     Large right pleural effusion:  9/14/2022 bedside chest tube placement  -Neutrophils 21, lymphocytes 74, monocytes 3, nucleated cells 1041, pH 7.50, glucose 111, , protein 3.7    9/14/2022 CT-guided biopsy right upper lobe lung mass pleural-based completed by IR  -Consistent with squamous cell carcinoma     Chronic obstructive pulmonary disease  Severe emphysema     Recommendations:     Chest tube management, -20 cm H2O  - Monitor cytology, and pleural cultures  -2400 cc output as of today     Oncology following for new diagnosis of squamous cell carcinoma     titrate oxygen, currently requiring 5 L per NC  -Was requiring 10 L per high flow     Antibiotic azithromycin  DVT prophylaxis Lovenox          LOS: 3 days     Subjective         Objective     Vital signs for last 24 hours:  Vitals:    09/15/22 1600 09/15/22 2028 09/15/22 2352 09/16/22 0407   BP: 120/70 112/64 124/69 124/69   BP Location: Right arm Right arm Right arm Right arm   Patient Position: Lying Lying Lying Lying   Pulse: 93 96 92 88   Resp: 16 16 16 16   Temp: 97.9 °F (36.6 °C) 98 °F (36.7 °C) 98 °F (36.7 °C) 97.4 °F (36.3 °C)   TempSrc: Oral Oral Oral Oral   SpO2: 95% 93% 94% 94%   Weight:       Height:           Intake/Output last 3 shifts:  I/O last 3 completed shifts:  In: 720 [P.O.:720]  Out: 1850 [Urine:1425; Chest Tube:425]  Intake/Output this shift:  No intake/output data recorded.      Radiology  Imaging Results (Last 24 Hours)     Procedure Component Value Units Date/Time    XR Chest 1 View [[id number]] Resulted: 09/16/22 0555     Updated: 09/16/22 0556    MRI Brain With &  Without Contrast [[id number]] Collected: 09/15/22 1453     Updated: 09/15/22 1511    Narrative:      MRI BRAIN W WO CONTRAST-     Date of Exam: 9/15/2022 1:50 PM     Indication: Non-small cell lung cancer (NSCLC), staging;  R06.02-Shortness of breath; R09.02-Hypoxemia; R91.8-Other nonspecific  abnormal finding of lung field.     Comparison: None available.     Technique: Multiplanar multisequence images of the brain were performed  prior to and after uneventful intravenous administration of 13 ml  Prohance.     FINDINGS:     Study is somewhat motion compromised.        No restricted diffusion to suggest acute or subacute infarct. Punctate  area of susceptibility artifact within the deep white matter right  parietal lobe likely sequela of prior hemosiderin distribution     No intracranial mass, midline shift, intracranial hemorrhage, or  pathologic extraaxial fluid collection.       No pathologic enhancement.       The ventricular system is nondilated.       Brain volume is normal for patient's age.       Punctate and patchy areas of FLAIR and T2 signal abnormality within the  deep, periventricular and subcortical white matter noted. Additional  areas noted within the basal ganglia brett and midbrain. There is no  associated abnormal enhancement. Findings are nonspecific but are most  compatible with small vessel ischemic change in this age group. The  major intracranial flow voids are maintained.       Mastoid air cells are grossly clear. There is opacification of the left  side of the frontal sinuses extending into the anterior ethmoid sinuses.     The globes and extraocular muscles are limited by motion. Thinning of  the lenses noted..       No cerebellar pontine angle masses.       Craniocervical junction is normal.       Pituitary gland has normal size and signal intensity for patient's age  and gender.          Impression:         1. Motion limited exam  2. No acute ischemic change  3. No abnormal enhancement  4.  Paranasal sinus disease  5. White matter changes which are nonspecific but most compatible small  vessel ischemic disease in this age group        Electronically Signed By-Lio Robbins On:9/15/2022 3:09 PM  This report was finalized on [id number] by  Lio Robbins, .    CT Abdomen Pelvis With Contrast [[id number]] Collected: 09/15/22 1440     Updated: 09/15/22 1454    Narrative:      EXAM: CT ABDOMEN PELVIS W CONTRAST-     DATE OF EXAM: 9/15/2022 1:32 PM     INDICATION: Non-small cell lung cancer (NSCLC), staging;  R06.02-Shortness of breath; R09.02-Hypoxemia; R91.8-Other nonspecific  abnormal finding of lung field.      COMPARISON: CT chest dated 9/13/2022, CT abdomen and pelvis dated  3/10/2014     TECHNIQUE: Contiguous axial CT images were obtained from the lung bases  to the pubic symphysis obtained following the uneventful intravenous  administration of 100 mL of Isovue 370 contrast. Sagittal and coronal  reconstructions were performed.  Automated exposure control and  iterative reconstruction methods were used.     FINDINGS:  There are trace bilateral pleural effusions. There is a smallbore  right-sided chest tube with tip terminating in the right lung base.  There is a tiny anterior basal right pneumothorax. There are findings of  centrilobular emphysema.     The liver is normal in size and contour. There are multiple small  low-density lesions within the liver likely representing small cysts.  The gallbladder is present with dependent gallstones. There is no  intrahepatic or extrahepatic biliary ductal dilatation. The spleen,  right adrenal gland, and pancreas appear within normal limits. There is  a 1.8 x 1.3 cm left adrenal nodule which is new from 2014. This is  indeterminate in the setting of pulmonary malignancy.     The kidneys are symmetric in size and enhancement. There are multiple  small low-density lesions which are too small to characterize, likely  representing cysts. There is no  hydronephrosis or hydroureter. The  urinary bladder is fluid-filled without wall thickening. The prostate is  enlarged.     The stomach and duodenum are normal in caliber and configuration. There  are no abnormally dilated loops of small bowel to suggest small bowel  obstruction or small bowel inflammation. The appendix is not visualized  and may be absent. There is a moderate colonic stool burden. There is no  evidence of acute colitis. There is sigmoid diverticulosis without acute  diverticulitis.     There is a fusiform infrarenal abdominal aortic aneurysm which measures  at least 5.6 cm in maximal transaxial dimension. The right common iliac  artery is dilated measuring up to 2.3 cm and there is focal saccular  aneurysm arising from the medial aspect of the left common iliac artery  measuring 11 mm x 7 mm. There is aortoiliac atherosclerotic disease.     There is no mesenteric, retroperitoneal, or pelvic lymphadenopathy by  size criteria. There is degenerative disc disease at L2-L3 and L4-5.  There are no suspicious lytic or sclerotic osseous lesions.       Impression:      1. Fusiform infrarenal abdominal aortic aneurysm measuring up to 5.6 cm  in size. Recommend vascular surgery consultation due to its size.  2. Indeterminant 1.8 cm left adrenal nodule which is new from 2014. In  the setting of pulmonary malignancy, this is indeterminant. Consider  follow-up assessment with PET/CT. CT or MRI adrenal protocol could also  be considered but may be less helpful than PET.  3. Right basilar chest tube with trace right-sided pleural effusion and  tiny anterior right basilar pneumothorax.           Electronically Signed By-Jc Nichols MD On:9/15/2022 2:52 PM  This report was finalized on [id number] by  Jc Nichols MD.    XR Chest 1 View [[id number]] Collected: 09/15/22 0814     Updated: 09/15/22 0818    Narrative:      EXAMINATION: XR CHEST 1 VW-     DATE OF EXAM: 9/15/2022 4:34 AM     INDICATION: chest  tube; R06.02-Shortness of breath; R09.02-Hypoxemia;  R91.8-Other nonspecific abnormal finding of lung field.     COMPARISON: Chest radiograph dated 9/14/2022     TECHNIQUE: Portable AP view of the chest was obtained.     FINDINGS:  There is a smallbore right basilar chest tube which appears in unchanged  position. There is no visible right-sided pneumothorax. There is a trace  right-sided pleural effusion. There is elevation of the right  hemidiaphragm. There is unchanged masslike opacity within the right  apex. The cardiac silhouette is unchanged. There is aortic arch  atherosclerotic calcification. Pulmonary vascularity appears normal.  There are chronic coarsened interstitial markings likely related to  chronic interstitial lung disease. There are degenerative changes of the  thoracic spine.       Impression:      1. Right-sided smallbore chest tube in unchanged position within the  basilar aspect of the right hemithorax. No pneumothorax.  2. Unchanged masslike opacity within the right apex with elevation of  the right hemidiaphragm.     Electronically Signed By-Jc Nichols MD On:9/15/2022 8:15 AM  This report was finalized on [id number] by  Jc Nichols MD.          Labs:  Results from last 7 days   Lab Units 09/13/22  2340   WBC 10*3/mm3 8.40   HEMOGLOBIN g/dL 14.6   HEMATOCRIT % 44.4   PLATELETS 10*3/mm3 166     Results from last 7 days   Lab Units 09/13/22  2340 09/13/22  1617   SODIUM mmol/L 138 137   POTASSIUM mmol/L 3.9 4.1   CHLORIDE mmol/L 101 100   CO2 mmol/L 26.0 26.0   BUN mg/dL 13 15   CREATININE mg/dL 0.79 0.86   CALCIUM mg/dL 10.2 10.5   BILIRUBIN mg/dL  --  0.9   ALK PHOS U/L  --  107   ALT (SGPT) U/L  --  15   AST (SGOT) U/L  --  18   GLUCOSE mg/dL 166* 100*     Results from last 7 days   Lab Units 09/13/22  1641   PH, ARTERIAL pH units 7.375   PO2 ART mm Hg 73.4*   PCO2, ARTERIAL mm Hg 42.0   HCO3 ART mmol/L 24.6     Results from last 7 days   Lab Units 09/13/22  1617   ALBUMIN g/dL  3.50     Results from last 7 days   Lab Units 09/13/22  1617   TROPONIN T ng/mL <0.010             Results from last 7 days   Lab Units 09/14/22  0743   INR  1.05               Meds:   SCHEDULE  aspirin, 81 mg, Oral, Daily  azithromycin, 500 mg, Oral, Q24H  enoxaparin, 40 mg, Subcutaneous, Q24H  sodium chloride, 10 mL, Intravenous, Q12H      Infusions     PRNs  •  acetaminophen **OR** acetaminophen **OR** acetaminophen  •  ipratropium-albuterol  •  ondansetron **OR** ondansetron  •  sodium chloride    Physical Exam:  Physical Exam  Cardiovascular:      Heart sounds: Murmur heard.   Pulmonary:      Breath sounds: Rales present.   Skin:     General: Skin is warm and dry.   Neurological:      Mental Status: He is alert and oriented to person, place, and time.         ROS  Review of Systems    I have reviewed the patient's new clinical results.    Electronically signed by ANN Bentley.

## 2022-09-16 NOTE — PLAN OF CARE
Problem: Adult Inpatient Plan of Care  Goal: Plan of Care Review  Outcome: Ongoing, Progressing  Flowsheets (Taken 9/16/2022 0229)  Outcome Evaluation: Pt on tele with sinus rhythm noted at this time. R Chest Tube continues. Serosanguineous fluid noted in atrium & tubing. Pt denied C/O pain earlier in this shift. Pt received XRT today. Pt resting abed at this time. Will continue to monitor.  Goal: Patient-Specific Goal (Individualized)  Outcome: Ongoing, Progressing  Goal: Absence of Hospital-Acquired Illness or Injury  Outcome: Ongoing, Progressing  Intervention: Identify and Manage Fall Risk  Recent Flowsheet Documentation  Taken 9/16/2022 0221 by Rob Duval RN  Safety Promotion/Fall Prevention: safety round/check completed  Taken 9/16/2022 0039 by Rob Duval RN  Safety Promotion/Fall Prevention: safety round/check completed  Taken 9/15/2022 2221 by Rob Duval RN  Safety Promotion/Fall Prevention: safety round/check completed  Taken 9/15/2022 2005 by Rob Duval RN  Safety Promotion/Fall Prevention:   safety round/check completed   nonskid shoes/slippers when out of bed   fall prevention program maintained   clutter free environment maintained   assistive device/personal items within reach  Intervention: Prevent Skin Injury  Recent Flowsheet Documentation  Taken 9/15/2022 2005 by Rob Duval RN  Body Position:   supine   position changed independently  Intervention: Prevent and Manage VTE (Venous Thromboembolism) Risk  Recent Flowsheet Documentation  Taken 9/15/2022 2005 by Rob Duval RN  Activity Management: activity adjusted per tolerance  Intervention: Prevent Infection  Recent Flowsheet Documentation  Taken 9/16/2022 0221 by Rob Duval RN  Infection Prevention:   hand hygiene promoted   personal protective equipment utilized   rest/sleep promoted   single patient room provided  Taken 9/16/2022 0039 by Rob Duval RN  Infection Prevention:   hand hygiene promoted    personal protective equipment utilized   single patient room provided   rest/sleep promoted  Taken 9/15/2022 2221 by Rob Duval RN  Infection Prevention:   hand hygiene promoted   personal protective equipment utilized   rest/sleep promoted   single patient room provided  Taken 9/15/2022 2005 by Rob Duval RN  Infection Prevention:   hand hygiene promoted   personal protective equipment utilized   rest/sleep promoted   single patient room provided  Goal: Optimal Comfort and Wellbeing  Outcome: Ongoing, Progressing  Intervention: Monitor Pain and Promote Comfort  Recent Flowsheet Documentation  Taken 9/15/2022 2005 by Rob Duval RN  Pain Management Interventions:   care clustered   diversional activity provided   pillow support provided   position adjusted   quiet environment facilitated   relaxation techniques promoted   unnecessary movement minimized  Intervention: Provide Person-Centered Care  Recent Flowsheet Documentation  Taken 9/15/2022 2005 by Rob Duval RN  Trust Relationship/Rapport:   care explained   choices provided   questions answered   questions encouraged   reassurance provided   thoughts/feelings acknowledged  Goal: Readiness for Transition of Care  Outcome: Ongoing, Progressing     Problem: COPD (Chronic Obstructive Pulmonary Disease) Comorbidity  Goal: Maintenance of COPD Symptom Control  Outcome: Ongoing, Progressing  Intervention: Maintain COPD-Symptom Control  Recent Flowsheet Documentation  Taken 9/16/2022 0221 by Rob Duval RN  Medication Review/Management: medications reviewed  Taken 9/16/2022 0039 by Rob Duval RN  Medication Review/Management: medications reviewed  Taken 9/15/2022 2221 by Rob Duval RN  Medication Review/Management: medications reviewed  Taken 9/15/2022 2005 by Rob Duval RN  Supportive Measures:   active listening utilized   verbalization of feelings encouraged  Medication Review/Management: medications reviewed   Goal  Outcome Evaluation:              Outcome Evaluation: Pt on tele with sinus rhythm noted at this time. R Chest Tube continues. Serosanguineous fluid noted in atrium & tubing. Pt denied C/O pain earlier in this shift. Pt received XRT today. Pt resting abed at this time. Will continue to monitor.

## 2022-09-16 NOTE — CASE MANAGEMENT/SOCIAL WORK
Continued Stay Note  ANDREI Brumfield     Patient Name: Deric Wylie  MRN: [id number]  Today's Date: 9/16/2022    Admit Date: 9/13/2022     Discharge Plan     Row Name 09/16/22 1444       Plan    Plan D/C plan: Anticipate routine home, declined SNF & rehab. Watch for oxygen needs, no home O2.    Patient/Family in Agreement with Plan yes    Plan Comments CM met with pt again at bedside today to discuss SNF and HH. Pt still declines at this time, will consider HH. Barrier to d/c: Chest tube, 6L oxygen.                   Expected Discharge Date and Time     Expected Discharge Date Expected Discharge Time    Sep 20, 2022         Met with patient in room wearing PPE: mask  Maintained distance greater than six feet and spent less than 15 minutes in the room.            ANNIE TODD RN

## 2022-09-16 NOTE — PLAN OF CARE
Problem: Adult Inpatient Plan of Care  Goal: Plan of Care Review  9/16/2022 1656 by Lizet Lincoln RN  Outcome: Ongoing, Progressing  9/16/2022 1656 by Lizet Lincoln RN  Outcome: Ongoing, Progressing  Goal: Patient-Specific Goal (Individualized)  9/16/2022 1656 by Lizet Lincoln RN  Outcome: Ongoing, Progressing  9/16/2022 1656 by Lizet Lincoln RN  Outcome: Ongoing, Progressing  Goal: Absence of Hospital-Acquired Illness or Injury  9/16/2022 1656 by Lizet Lincoln RN  Outcome: Ongoing, Progressing  9/16/2022 1656 by Lizet Lincoln RN  Outcome: Ongoing, Progressing  Intervention: Identify and Manage Fall Risk  Recent Flowsheet Documentation  Taken 9/16/2022 1415 by Lizet Lincoln RN  Safety Promotion/Fall Prevention: safety round/check completed  Taken 9/16/2022 1212 by Lizet Lincoln RN  Safety Promotion/Fall Prevention: safety round/check completed  Taken 9/16/2022 1000 by Lizet Lincoln RN  Safety Promotion/Fall Prevention: safety round/check completed  Taken 9/16/2022 0800 by Lizet Lincoln RN  Safety Promotion/Fall Prevention: safety round/check completed  Intervention: Prevent and Manage VTE (Venous Thromboembolism) Risk  Recent Flowsheet Documentation  Taken 9/16/2022 0800 by Lizet Lincoln RN  Range of Motion: active ROM (range of motion) encouraged  Goal: Optimal Comfort and Wellbeing  9/16/2022 1656 by Lizet Lincoln RN  Outcome: Ongoing, Progressing  9/16/2022 1656 by Lizet Lincoln RN  Outcome: Ongoing, Progressing  Intervention: Provide Person-Centered Care  Recent Flowsheet Documentation  Taken 9/16/2022 0800 by Lizet Lincoln RN  Trust Relationship/Rapport:   emotional support provided   care explained  Goal: Readiness for Transition of Care  9/16/2022 1656 by Lizet Lincoln RN  Outcome: Ongoing, Progressing  9/16/2022 1656 by Lizet Lincoln RN  Outcome: Ongoing, Progressing     Problem: COPD (Chronic Obstructive Pulmonary Disease) Comorbidity  Goal: Maintenance of COPD Symptom  Control  9/16/2022 1656 by Lizet Lincoln, RN  Outcome: Ongoing, Progressing  9/16/2022 1656 by Lizet Lincoln, RN  Outcome: Ongoing, Progressing   Goal Outcome Evaluation:      Pt seems to be coping well with cancer diagnosis. Up today with physical therapy and sat up in the chair for several hours. Dressing on right upper chest biopsy site changed today 9/16. Pt resting in bed. Some complaints of minor pain in right shoulder blade throughout the day, stated no need for medication.

## 2022-09-16 NOTE — THERAPY TREATMENT NOTE
Subjective: Pt agreeable to therapeutic plan of care.    Objective:     Bed mobility - SBA supine to sit  Transfers - CGA and with rolling walker  Sit to stand.  Pt still has a chest tube and on 5L HF NC  Ambulation - 140 feet CGA and with rolling walker  Lateral sway due to bilateral knee varus deformity. Pt with good step through gait pattern. Pt fairly steady with roll walker    Vitals: WNL    Pain: 3 VAS  Chest tube site on right flank  Education: Provided education on importance of mobility and skilled verbal / tactile cueing throughout intervention.     Assessment: Deric Wylie presents with functional mobility impairments which indicate the need for skilled intervention.  pt seems to be maintaining good mobility while in this acute hospital.  Pt demonstrates potential to cont to improve.   Tolerating session today without incident. Will continue to follow and progress as tolerated.     Plan/Recommendations:   Low Intensity Therapy recommended post-acute care - This is recommended as therapy feels this patient would require 2-3 visits per week. HHPT would be recommended. Pt requires rolling walker at discharge.     Pt desires Home with Home Health at discharge. Pt cooperative; agreeable to therapeutic recommendations and plan of care.     Basic Mobility 6-click:  Rollin = Total, A lot = 2, A little = 3; 4 = None  Supine>Sit:   1 = Total, A lot = 2, A little = 3; 4 = None   Sit>Stand with arms:  1 = Total, A lot = 2, A little = 3; 4 = None  Bed>Chair:   1 = Total, A lot = 2, A little = 3; 4 = None  Ambulate in room:  1 = Total, A lot = 2, A little = 3; 4 = None  3-5 Steps with railin = Total, A lot = 2, A little = 3; 4 = None  Score: 19    Modified Ny: N/A = No pre-op stroke/TIA    Post-Tx Position: Up in Chair and Call light and personal items within reach   PPE: mask, gloves and eye protection

## 2022-09-16 NOTE — PLAN OF CARE
Goal Outcome Evaluation:     Bed mobility - SBA supine to sit  Transfers - CGA and with rolling walker  Sit to stand.  Pt still has a chest tube and on 5L HF NC  Ambulation - 140 feet CGA and with rolling walker  Lateral sway due to varus deformity. Pt with good step through gait pattern. Pt fairly steady with roll walker    Low Intensity Therapy recommended post-acute care - This is recommended as therapy feels this patient would require 2-3 visits per week. HHPT would be recommended. Pt requires rolling walker at discharge.     Pt desires Home with Home Health at discharge. Pt cooperative; agreeable to therapeutic recommendations and plan of care.

## 2022-09-17 NOTE — PROGRESS NOTES
[facility]     Progress Note    Patient Name: Deric Wylie  : 1933  MRN: [id number]  Primary Care Physician:  Bernardo Singh MD  Date of admission: 2022  Service date and time: 22 12:51 EDT  Subjective   Subjective     Chief Complaint: SOB    HPI:  Patient Reports feeling well, CT removed today      Objective   Objective     Vitals:   Temp:  [97.3 °F (36.3 °C)-98.1 °F (36.7 °C)] 97.7 °F (36.5 °C)  Heart Rate:  [82-97] 82  Resp:  [16-17] 16  BP: (112-130)/(70-86) 130/86  Flow (L/min):  [5] 5  Physical Exam    Constitutional: Awake, alert   Eyes: PERRLA, sclerae anicteric, no conjunctival injection   HENT: NCAT, mucous membranes moist   Neck: Supple, no thyromegaly, no lymphadenopathy, trachea midline   Respiratory: Clear to auscultation bilaterally, nonlabored respirations   Cardiovascular: RRR, no murmurs, rubs, or gallops, palpable pedal pulses bilaterally   Gastrointestinal: Positive bowel sounds, soft, nontender, nondistended   Musculoskeletal: No bilateral ankle edema, no clubbing or cyanosis to extremities   Psychiatric: Appropriate affect, cooperative   Neurologic: Oriented x 3, strength symmetric in all extremities, Cranial Nerves grossly intact to confrontation, speech clear   Skin: No rashes     Result Review    Result Review:  I have personally reviewed the results from the time of this admission to 2022 12:51 EDT and agree with these findings:  [x]  Laboratory list / accordion  []  Microbiology  [x]  Radiology  [x]  EKG/Telemetry   []  Cardiology/Vascular   []  Pathology  []  Old records  []  Other:        Assessment & Plan   Assessment / Plan       Active Hospital Problems:  Active Hospital Problems    Diagnosis    • **Shortness of breath    • Malignant neoplasm of upper lobe of right lung (HCC)    Acute hypoxic respiratory failure  Right pleural effusion s/p CT tube    Plan:    - pulmonary following, CT removed today  - oncology and rad/onc following and plans for  radiation now that CT has been removed  - wean oxygen, supportive care  - trend labs, pain control  - cont home meds as able  - PT/OT    DVT prophylaxis:  Medical DVT prophylaxis orders are present.    CODE STATUS:   Code Status (Patient has no pulse and is not breathing): CPR (Attempt to Resuscitate)  Medical Interventions (Patient has pulse or is breathing): Full Support    Disposition:  I expect patient to be discharged 4-5 days  Chavo Beard MD

## 2022-09-17 NOTE — PROGRESS NOTES
Hematology oncology progress note    Patient is a 89-year-old male who presented to the hospital with shortness of breath CT findings concerning for right upper lobe mass encasing SVC, pulmonary artery.  Patient had a CT-guided biopsy on 9/14/2022 consistent with invasive moderately differentiated squamous cell carcinoma.  Patient also had a chest tube placement on the right side fluid sent for cytology consistent with malignant effusion.    Subjective  Continues to have chest tube, shortness of breath persisting.  No other new symptoms.  Continues to be on oxygen    Physical exam   Chest tube removed, on 5 lit NC, decreased bibasilar breath sounds    Results -pathology results consistent with invasive moderately differentiated squamous cell carcinoma      Assessment and plan    Non-small cell lung cancer  Patient has stage IV disease with pleural effusion  Complete staging we obtained CT abdomen, MRI brain, CT abdomen showing left adrenal nodule, infrarenal abdominal aortic aneurysm 5.8 cm.  MRI brain was negative  I have discussed case with radiation oncology with significant shortness of breath, potential for worsening as we pursue molecular testing and the fact this is not a non-small cell lung cancer.  We can consider palliative radiation treatment to start with this will help with his shortness of breath, pain associated with the tumor  In the meantime we will obtain NexGen ration sequencing with Anoop PD-L1 analysis plan on starting chemotherapy after completion of radiation.  Chest tube management per pulmonary    Infrarenal abdominal aortic aneurysm  Vascular surgery outpatient consultation.

## 2022-09-17 NOTE — PROGRESS NOTES
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     Large right pleural effusion:  9/14/2022 bedside chest tube placement  -Neutrophils 21, lymphocytes 74, monocytes 3, nucleated cells 1041, pH 7.50, glucose 111, , protein 3.7  -cytology positive with malignancy consistent with non-small cell carcinoma    9/14/2022 CT-guided biopsy right upper lobe lung mass pleural-based completed by IR  -Consistent with squamous cell carcinoma     Chronic obstructive pulmonary disease  Severe emphysema     Recommendations:     Pleural fluid is positive for cytology, chest x-ray showed no residual fluid will remove chest tube today and monitor any reaccumulation     Oncology following for new diagnosis of squamous cell carcinoma     titrate oxygen, currently requiring 5 L per NC  -Was requiring 10 L per high flow     Antibiotic azithromycin  DVT prophylaxis Lovenox          LOS: 4 days     Subjective         Objective     Vital signs for last 24 hours:  Vitals:    09/16/22 1947 09/16/22 2356 09/17/22 0359 09/17/22 0750   BP: 129/73 112/70 115/74 129/77   BP Location: Right arm Right arm Right arm Right arm   Patient Position: Lying Lying Lying Lying   Pulse: 97 96 95 94   Resp: 16 16 16 16   Temp: 97.3 °F (36.3 °C) 97.6 °F (36.4 °C) 97.5 °F (36.4 °C) 97.3 °F (36.3 °C)   TempSrc: Oral Oral Oral Oral   SpO2: 93% 92% 92% 92%   Weight:       Height:           Intake/Output last 3 shifts:  I/O last 3 completed shifts:  In: 431 [P.O.:431]  Out: 3075 [Urine:2775; Chest Tube:300]  Intake/Output this shift:  No intake/output data recorded.      Radiology  Imaging Results (Last 24 Hours)     Procedure Component Value Units  Date/Time    XR Chest 1 View [[id number]] Resulted: 09/17/22 0036     Updated: 09/17/22 0037          Labs:  Results from last 7 days   Lab Units 09/13/22  2340   WBC 10*3/mm3 8.40   HEMOGLOBIN g/dL 14.6   HEMATOCRIT % 44.4   PLATELETS 10*3/mm3 166     Results from last 7 days   Lab Units 09/13/22  2340 09/13/22  1617   SODIUM mmol/L 138 137   POTASSIUM mmol/L 3.9 4.1   CHLORIDE mmol/L 101 100   CO2 mmol/L 26.0 26.0   BUN mg/dL 13 15   CREATININE mg/dL 0.79 0.86   CALCIUM mg/dL 10.2 10.5   BILIRUBIN mg/dL  --  0.9   ALK PHOS U/L  --  107   ALT (SGPT) U/L  --  15   AST (SGOT) U/L  --  18   GLUCOSE mg/dL 166* 100*     Results from last 7 days   Lab Units 09/13/22  1641   PH, ARTERIAL pH units 7.375   PO2 ART mm Hg 73.4*   PCO2, ARTERIAL mm Hg 42.0   HCO3 ART mmol/L 24.6     Results from last 7 days   Lab Units 09/13/22  1617   ALBUMIN g/dL 3.50     Results from last 7 days   Lab Units 09/13/22  1617   TROPONIN T ng/mL <0.010             Results from last 7 days   Lab Units 09/14/22  0743   INR  1.05               Meds:   SCHEDULE  aspirin, 81 mg, Oral, Daily  azithromycin, 500 mg, Oral, Q24H  enoxaparin, 40 mg, Subcutaneous, Q24H  sodium chloride, 10 mL, Intravenous, Q12H      Infusions     PRNs  •  acetaminophen **OR** acetaminophen **OR** acetaminophen  •  aluminum-magnesium hydroxide-simethicone  •  ipratropium-albuterol  •  ondansetron **OR** ondansetron  •  sodium chloride    Physical Exam:  Physical Exam  Cardiovascular:      Heart sounds: Murmur heard.   Pulmonary:      Breath sounds: Rales present.   Skin:     General: Skin is warm and dry.   Neurological:      Mental Status: He is alert and oriented to person, place, and time.         ROS  Review of Systems    I have reviewed the patient's new clinical results.    Electronically signed by ANN Bentley.

## 2022-09-18 NOTE — THERAPY TREATMENT NOTE
"Subjective: Pt agreeable to therapeutic plan of care. Pt refusing rehab and HH, stated he can get some help as needed and will need a rwx.    Objective:     Bed mobility - N/A or Not attempted.  Transfers - Supervision and with rolling walker  Ambulation - 100 feet SBA and with rolling walker    Vitals: WNL on 4L hf.     Pain: 0 VAS  Education: Provided education on importance of mobility and skilled verbal / tactile cueing throughout intervention.     Assessment: Deric Wylie presents with functional mobility impairments which indicate the need for skilled intervention. Tolerating session today without incident. Pt very varus deformity. No Lob noted. Will need rwx for home and supposed to get a neighbor to assist as needed.  Will continue to follow and progress as tolerated.     Plan/Recommendations:   Low Intensity Therapy recommended post-acute care - This is recommended as therapy feels this patient would require 2-3 visits per week. OP or HH would be the best option depending on patient's home bound status. Consider, if the patient has other  \"skilled\" needs such as wounds, IV antibiotics, etc. Combined with \"low intensity\" could also equate to a SNF. If patient is medically complex, consider LTAC.. Pt requires rolling walker at discharge.     Pt desires Home at discharge. Pt cooperative; agreeable to therapeutic recommendations and plan of care.         Basic Mobility 6-click:  Rollin = Total, A lot = 2, A little = 3; 4 = None  Supine>Sit:   1 = Total, A lot = 2, A little = 3; 4 = None   Sit>Stand with arms:  1 = Total, A lot = 2, A little = 3; 4 = None  Bed>Chair:   1 = Total, A lot = 2, A little = 3; 4 = None  Ambulate in room:  1 = Total, A lot = 2, A little = 3; 4 = None  3-5 Steps with railin = Total, A lot = 2, A little = 3; 4 = None  Score: 20    Post-Tx Position: Up in Chair and Call light and personal items within reach  PPE: mask and gloves  "

## 2022-09-18 NOTE — PROGRESS NOTES
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     Large right pleural effusion:  9/14/2022 bedside chest tube placement, removed 9/17/2022  -Neutrophils 21, lymphocytes 74, monocytes 3, nucleated cells 1041, pH 7.50, glucose 111, , protein 3.7  -cytology positive with malignancy consistent with non-small cell carcinoma    9/14/2022 CT-guided biopsy right upper lobe lung mass pleural-based completed by IR  -Consistent with squamous cell carcinoma     Chronic obstructive pulmonary disease  Severe emphysema     Recommendations:    Continue to monitor for reaccumulation of pleural fluid     titrate oxygen, currently requiring 5 L per NC  -Was requiring 10 L per high flow     Oncology following for new diagnosis of squamous cell carcinoma    Antibiotic azithromycin  DVT prophylaxis Lovenox          LOS: 5 days     Subjective         Objective     Vital signs for last 24 hours:  Vitals:    09/17/22 2005 09/18/22 0015 09/18/22 0459 09/18/22 0745   BP: 137/77 117/78 127/81 144/84   BP Location:    Right arm   Patient Position:    Lying   Pulse:  98 87 93   Resp:  16 16 16   Temp: 97.8 °F (36.6 °C) 97.8 °F (36.6 °C) 97.7 °F (36.5 °C) 97.6 °F (36.4 °C)   TempSrc: Oral  Oral Oral   SpO2:  92% 95% 95%   Weight:       Height:           Intake/Output last 3 shifts:  I/O last 3 completed shifts:  In: 1400 [P.O.:1400]  Out: 2950 [Urine:2950]  Intake/Output this shift:  I/O this shift:  In: 240 [P.O.:240]  Out: -       Radiology  Imaging Results (Last 24 Hours)     Procedure Component Value Units Date/Time    XR Chest 1 View [[id number]] Collected: 09/18/22 0839     Updated: 09/18/22 0842    Narrative:      XR CHEST 1  VW-     Date of Exam: 9/18/2022 12:42 AM     Indication: chest tube; R06.02-Shortness of breath; R09.02-Hypoxemia;  R91.8-Other nonspecific abnormal finding of lung field.     Comparison: 09/17/2022     Technique: A single view of the chest was obtained.     FINDINGS:      Heart size and pulmonary vessels are within normal limits.  There  is emphysematous change of the lungs.  Right upper lobe mass is  unchanged.  There is some patchy right perihilar airspace disease which  may be secondary to atelectasis or developing pneumonia.  Left lung  remains grossly clear.  There is chronic elevation of the right  hemidiaphragm.  No evidence pneumothorax.             Impression:            1.  Patchy right perihilar airspace disease which may be secondary to  atelectasis or pneumonia.  2.  No change in right upper lobe mass.  3.  Emphysema.        Electronically Signed By-John Nelson MD On:9/18/2022 8:40 AM  This report was finalized on [id number] by  John Nelson MD.    XR Chest 1 View [[id number]] Collected: 09/17/22 1022     Updated: 09/17/22 1027    Narrative:      XR CHEST 1 VW-     Date of Exam: 9/17/2022 10:18 AM     Indication: chest tube removal; R06.02-Shortness of breath;  R09.02-Hypoxemia; R91.8-Other nonspecific abnormal finding of lung  field.     Comparison: 09/17/2022 at 12:22 AM     Technique: A single view of the chest was obtained.     FINDINGS:      Heart size and pulmonary vessels are within normal limits.  There  is been no interval change in right upper lobe mass.  The small caliber  right thoracostomy tube has been removed.  There is no evidence  pneumothorax.  No right-sided pleural effusion.  No new airspace  consolidation.  There is improved aeration within the left lung base  consistent with improving atelectasis or pneumonia.  No left-sided  pleural effusion or pneumothorax.             Impression:            1.  Interval removal of right-sided thoracostomy tube.  No  evidence  pneumothorax or pleural effusion.  2.  No change in right upper lobe mass.  3.  Improving aeration within the left lung base.        Electronically Signed By-John Nelson MD On:9/17/2022 10:25 AM  This report was finalized on [id number] by  John Nelson MD.          Labs:  Results from last 7 days   Lab Units 09/13/22  2340   WBC 10*3/mm3 8.40   HEMOGLOBIN g/dL 14.6   HEMATOCRIT % 44.4   PLATELETS 10*3/mm3 166     Results from last 7 days   Lab Units 09/13/22  2340 09/13/22  1617   SODIUM mmol/L 138 137   POTASSIUM mmol/L 3.9 4.1   CHLORIDE mmol/L 101 100   CO2 mmol/L 26.0 26.0   BUN mg/dL 13 15   CREATININE mg/dL 0.79 0.86   CALCIUM mg/dL 10.2 10.5   BILIRUBIN mg/dL  --  0.9   ALK PHOS U/L  --  107   ALT (SGPT) U/L  --  15   AST (SGOT) U/L  --  18   GLUCOSE mg/dL 166* 100*     Results from last 7 days   Lab Units 09/13/22  1641   PH, ARTERIAL pH units 7.375   PO2 ART mm Hg 73.4*   PCO2, ARTERIAL mm Hg 42.0   HCO3 ART mmol/L 24.6     Results from last 7 days   Lab Units 09/13/22  1617   ALBUMIN g/dL 3.50     Results from last 7 days   Lab Units 09/13/22  1617   TROPONIN T ng/mL <0.010             Results from last 7 days   Lab Units 09/14/22  0743   INR  1.05               Meds:   SCHEDULE  aspirin, 81 mg, Oral, Daily  sodium chloride, 10 mL, Intravenous, Q12H      Infusions     PRNs  •  acetaminophen **OR** acetaminophen **OR** acetaminophen  •  aluminum-magnesium hydroxide-simethicone  •  ipratropium-albuterol  •  ondansetron **OR** ondansetron  •  sodium chloride    Physical Exam:  Physical Exam  Cardiovascular:      Heart sounds: Murmur heard.   Pulmonary:      Breath sounds: Rales present.   Skin:     General: Skin is warm and dry.   Neurological:      Mental Status: He is alert and oriented to person, place, and time.         ROS  Review of Systems    I have reviewed the patient's new clinical results.    Electronically signed by ANN Bentley.

## 2022-09-18 NOTE — PROGRESS NOTES
Hematology oncology progress note    Patient is a 89-year-old male who presented to the hospital with shortness of breath CT findings concerning for right upper lobe mass encasing SVC, pulmonary artery.  Patient had a CT-guided biopsy on 9/14/2022 consistent with invasive moderately differentiated squamous cell carcinoma.  Patient also had a chest tube placement on the right side fluid sent for cytology consistent with malignant effusion.    Subjective  Chest tube has been removed. Decreased to 5 lit NC, shortness of breath is improved.    Physical exam   Chest tube removed, on 5 lit NC, decrease breath sounds on right side.    Results -pathology results consistent with invasive moderately differentiated squamous cell carcinoma      Assessment and plan    Non-small cell lung cancer  Patient has stage IV disease with pleural effusion  Complete staging we obtained CT abdomen, MRI brain, CT abdomen showing left adrenal nodule, infrarenal abdominal aortic aneurysm 5.8 cm.  MRI brain was negative  I have discussed case with radiation oncology with significant shortness of breath, potential for worsening as we pursue molecular testing and the fact this is not a non-small cell lung cancer.  We can consider palliative radiation treatment to start with this will help with his shortness of breath, pain associated with the tumor  In the meantime we will obtain NexGen ration sequencing with Anoop PD-L1 analysis plan on starting chemotherapy after completion of radiation.    Now d/c chest tube, will need home oxygen, could be potentially discharged to follow up outpatient for radiation.    Infrarenal abdominal aortic aneurysm  Vascular surgery outpatient consultation.

## 2022-09-18 NOTE — PROGRESS NOTES
[facility]     Progress Note    Patient Name: Deric Wylie  : 1933  MRN: [id number]  Primary Care Physician:  Bernardo Singh MD  Date of admission: 2022  Service date and time: 22 10:51 EDT  Subjective   Subjective     Chief Complaint: SOB    HPI:  Patient Reports feeling the same, resting in bed, no distress      Objective   Objective     Vitals:   Temp:  [97.6 °F (36.4 °C)-97.8 °F (36.6 °C)] 97.6 °F (36.4 °C)  Heart Rate:  [82-98] 93  Resp:  [16] 16  BP: (116-144)/(72-86) 144/84  Flow (L/min):  [5] 5  Physical Exam    Constitutional: Awake, alert   Eyes: PERRLA, sclerae anicteric, no conjunctival injection   HENT: NCAT, mucous membranes moist   Neck: Supple, no thyromegaly, no lymphadenopathy, trachea midline   Respiratory: decreased BS   Cardiovascular: RRR, no murmurs, rubs, or gallops, palpable pedal pulses bilaterally   Gastrointestinal: Positive bowel sounds, soft, nontender, nondistended   Musculoskeletal: No bilateral ankle edema, no clubbing or cyanosis to extremities   Psychiatric: Appropriate affect, cooperative   Neurologic: Oriented x 3, strength symmetric in all extremities, Cranial Nerves grossly intact to confrontation, speech clear   Skin: No rashes     Result Review    Result Review:  I have personally reviewed the results from the time of this admission to 2022 10:51 EDT and agree with these findings:  [x]  Laboratory list / accordion  []  Microbiology  [x]  Radiology  [x]  EKG/Telemetry   []  Cardiology/Vascular   []  Pathology  []  Old records  []  Other:        Assessment & Plan   Assessment / Plan       Active Hospital Problems:  Active Hospital Problems    Diagnosis    • **Shortness of breath    • Malignant neoplasm of upper lobe of right lung (HCC)    Acute hypoxic respiratory failure  Right pleural effusion s/p CT tube    Plan:    - pulmonary following, CT removed yesterday, monitor for reaccumulation of pleural fluid  - oncology and rad/onc  following and plans for radiation now that CT has been removed  - wean oxygen, on 5 liters currently, supportive care  - trend labs, pain control  - cont home meds as able  - PT/OT    DVT prophylaxis:  No DVT prophylaxis order currently exists.    CODE STATUS:   Code Status (Patient has no pulse and is not breathing): CPR (Attempt to Resuscitate)  Medical Interventions (Patient has pulse or is breathing): Full Support    Disposition:  I expect patient to be discharged 4-5 days  Chavo Beard MD

## 2022-09-18 NOTE — PLAN OF CARE
Assessment: Deric Wylie presents with functional mobility impairments which indicate the need for skilled intervention. Tolerating session today without incident. Pt very varus deformity. No Lob noted. Will need rwx for home and supposed to get a neighbor to assist as needed.  Will continue to follow and progress as tolerated.

## 2022-09-18 NOTE — PLAN OF CARE
Problem: Adult Inpatient Plan of Care  Goal: Plan of Care Review  Outcome: Ongoing, Progressing  Goal: Patient-Specific Goal (Individualized)  Outcome: Ongoing, Progressing  Goal: Absence of Hospital-Acquired Illness or Injury  Outcome: Ongoing, Progressing  Intervention: Identify and Manage Fall Risk  Recent Flowsheet Documentation  Taken 9/18/2022 1600 by Sharda Summers RN  Safety Promotion/Fall Prevention: safety round/check completed  Taken 9/18/2022 1400 by Sharda Summers RN  Safety Promotion/Fall Prevention: safety round/check completed  Taken 9/18/2022 1200 by Sharda Summers RN  Safety Promotion/Fall Prevention: safety round/check completed  Taken 9/18/2022 1000 by Sharda Summers RN  Safety Promotion/Fall Prevention: safety round/check completed  Taken 9/18/2022 0815 by Sharda Summers RN  Safety Promotion/Fall Prevention: safety round/check completed  Intervention: Prevent Skin Injury  Recent Flowsheet Documentation  Taken 9/18/2022 0815 by Sharda Summers RN  Body Position: position changed independently  Intervention: Prevent and Manage VTE (Venous Thromboembolism) Risk  Recent Flowsheet Documentation  Taken 9/18/2022 0815 by Sharda Summers RN  Activity Management:   activity adjusted per tolerance   up in chair  Goal: Optimal Comfort and Wellbeing  Outcome: Ongoing, Progressing  Intervention: Provide Person-Centered Care  Recent Flowsheet Documentation  Taken 9/18/2022 0815 by Sharda Summers RN  Trust Relationship/Rapport:   care explained   questions encouraged   questions answered   reassurance provided   thoughts/feelings acknowledged  Goal: Readiness for Transition of Care  Outcome: Ongoing, Progressing     Problem: COPD (Chronic Obstructive Pulmonary Disease) Comorbidity  Goal: Maintenance of COPD Symptom Control  Outcome: Ongoing, Progressing   Goal Outcome Evaluation:

## 2022-09-19 NOTE — PLAN OF CARE
Goal Outcome Evaluation:  Plan of Care Reviewed With: patient        Progress: improving   Patient is alert and oriented, up in chair with call light in reach. PT recommended discharging home with walker. Patient is awaiting for possible discharge home tomorrow.

## 2022-09-19 NOTE — PROGRESS NOTES
"PULMONARY CRITICAL CARE Progress  NOTE      PATIENT IDENTIFICATION:  Name: Deric Wylie  MRN: [id number]  :  1933     Age: 89 y.o.  Sex: male    DATE OF Note:  2022   Referring Physician: Clifford Knight MD                  Subjective:   Feeling a little better, states he is coughing up some blood since yesterday, no SOB, no chest or abdominal pain, no nausea or vomiting, no bowel or bladder issues reported    Objective:  tMax 24 hrs: Temp (24hrs), Av.7 °F (36.5 °C), Min:97.3 °F (36.3 °C), Max:98.2 °F (36.8 °C)      Vitals Ranges:   Temp:  [97.3 °F (36.3 °C)-98.2 °F (36.8 °C)] 97.5 °F (36.4 °C)  Heart Rate:  [63-98] 94  Resp:  [16-18] 18  BP: (109-138)/(71-80) 111/74    Intake and Output Last 3 Shifts:   I/O last 3 completed shifts:  In: 880 [P.O.:880]  Out: 1930 [Urine:1930]    Exam:  /74 (BP Location: Right arm, Patient Position: Lying)   Pulse 94   Temp 97.5 °F (36.4 °C) (Oral)   Resp 18   Ht 180.3 cm (71\")   Wt 64.3 kg (141 lb 12.8 oz)   SpO2 94%   BMI 19.78 kg/m²     General Appearance:   Alert  HEENT:  Normocephalic, without obvious abnormality, Conjunctivae/corneas clear.  Normal external ear canals, nares normal, no drainage     Neck:  Supple, symmetrical, trachea midline. No JVD.  Lungs /Chest wall:   Bilateral basal rhonchi, respirations unlabored, symmetrical wall movement.     Heart:  Regular rate and rhythm, systolic murmur PMI left sternal border  Abdomen: Soft, nontender, no masses, no organomegaly.    Extremities: Trace edema, no clubbing or cyanosis        Medications:    Current Facility-Administered Medications:   •  acetaminophen (TYLENOL) tablet 650 mg, 650 mg, Oral, Q4H PRN, 650 mg at 22 1612 **OR** acetaminophen (TYLENOL) 160 MG/5ML solution 650 mg, 650 mg, Oral, Q4H PRN **OR** acetaminophen (TYLENOL) suppository 650 mg, 650 mg, Rectal, Q4H PRN, Guillermina Garcia, APRN  •  aluminum-magnesium hydroxide-simethicone (MAALOX MAX) 400-400-40 MG/5ML " suspension 15 mL, 15 mL, Oral, Q6H PRN, Kiara Moreno MD, 15 mL at 09/17/22 2225  •  aspirin chewable tablet 81 mg, 81 mg, Oral, Daily, Bernardo Lorenzo MD, 81 mg at 09/19/22 0932  •  ipratropium-albuterol (DUO-NEB) nebulizer solution 3 mL, 3 mL, Nebulization, Q4H PRN, Guillermina Garcia APRN  •  ondansetron (ZOFRAN) tablet 4 mg, 4 mg, Oral, Q6H PRN **OR** ondansetron (ZOFRAN) injection 4 mg, 4 mg, Intravenous, Q6H PRN, Guillermina Garcia APRN  •  sodium chloride 0.9 % flush 10 mL, 10 mL, Intravenous, Q12H, Guillermina Garcia APRN, 10 mL at 09/19/22 0932  •  sodium chloride 0.9 % flush 10 mL, 10 mL, Intravenous, PRN, Guillermina Garcia APRN    Data Review:  All labs (24hrs):   Recent Results (from the past 24 hour(s))   Comprehensive Metabolic Panel    Collection Time: 09/18/22  1:55 PM    Specimen: Blood   Result Value Ref Range    Glucose 211 (H) 65 - 99 mg/dL    BUN 13 8 - 23 mg/dL    Creatinine 0.73 (L) 0.76 - 1.27 mg/dL    Sodium 131 (L) 136 - 145 mmol/L    Potassium 5.0 3.5 - 5.2 mmol/L    Chloride 93 (L) 98 - 107 mmol/L    CO2 32.0 (H) 22.0 - 29.0 mmol/L    Calcium 11.8 (H) 8.6 - 10.5 mg/dL    Total Protein 6.8 6.0 - 8.5 g/dL    Albumin 3.30 (L) 3.50 - 5.20 g/dL    ALT (SGPT) 22 1 - 41 U/L    AST (SGOT) 24 1 - 40 U/L    Alkaline Phosphatase 108 39 - 117 U/L    Total Bilirubin 0.7 0.0 - 1.2 mg/dL    Globulin 3.5 gm/dL    A/G Ratio 0.9 g/dL    BUN/Creatinine Ratio 17.8 7.0 - 25.0    Anion Gap 6.0 5.0 - 15.0 mmol/L    eGFR 87.0 >60.0 mL/min/1.73   CBC Auto Differential    Collection Time: 09/18/22  1:55 PM    Specimen: Blood   Result Value Ref Range    WBC 7.90 3.40 - 10.80 10*3/mm3    RBC 5.71 4.14 - 5.80 10*6/mm3    Hemoglobin 16.1 13.0 - 17.7 g/dL    Hematocrit 49.9 37.5 - 51.0 %    MCV 87.3 79.0 - 97.0 fL    MCH 28.1 26.6 - 33.0 pg    MCHC 32.2 31.5 - 35.7 g/dL    RDW 15.6 (H) 12.3 - 15.4 %    RDW-SD 47.3 37.0 - 54.0 fl    MPV 8.4 6.0 - 12.0 fL    Platelets 185 140 - 450 10*3/mm3    Neutrophil % 80.1 (H) 42.7  - 76.0 %    Lymphocyte % 9.6 (L) 19.6 - 45.3 %    Monocyte % 7.5 5.0 - 12.0 %    Eosinophil % 1.7 0.3 - 6.2 %    Basophil % 1.1 0.0 - 1.5 %    Neutrophils, Absolute 6.40 1.70 - 7.00 10*3/mm3    Lymphocytes, Absolute 0.80 0.70 - 3.10 10*3/mm3    Monocytes, Absolute 0.60 0.10 - 0.90 10*3/mm3    Eosinophils, Absolute 0.10 0.00 - 0.40 10*3/mm3    Basophils, Absolute 0.10 0.00 - 0.20 10*3/mm3    nRBC 0.1 0.0 - 0.2 /100 WBC        Imaging:  XR Chest 1 View  Narrative: XR CHEST 1 VW-     Date of Exam: 9/18/2022 12:42 AM     Indication: chest tube; R06.02-Shortness of breath; R09.02-Hypoxemia;  R91.8-Other nonspecific abnormal finding of lung field.     Comparison: 09/17/2022     Technique: A single view of the chest was obtained.     FINDINGS:      Heart size and pulmonary vessels are within normal limits.  There  is emphysematous change of the lungs.  Right upper lobe mass is  unchanged.  There is some patchy right perihilar airspace disease which  may be secondary to atelectasis or developing pneumonia.  Left lung  remains grossly clear.  There is chronic elevation of the right  hemidiaphragm.  No evidence pneumothorax.           Impression:       1.  Patchy right perihilar airspace disease which may be secondary to  atelectasis or pneumonia.  2.  No change in right upper lobe mass.  3.  Emphysema.        Electronically Signed By-John Nelson MD On:9/18/2022 8:40 AM  This report was finalized on [id number] by  John Nelson MD.       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 Beverley Holman, APRN   9/19/2022  11:33 EDT     I personally have examined  and interviewed the patient. I have reviewed the history, data, problems,  assessment and plan with our NP.  Total Critical care time in direct medical management (   ) minutes, This time specifically excludes time spent performing procedures.    Franklin Lowery MD   9/19/2022  11:37 EDT

## 2022-09-19 NOTE — PROGRESS NOTES
[facility] Medicine Services Daily Progress Note    Patient Name: Deric Wylie  : 1933  MRN: [id number]  Primary Care Physician:  Bernardo Singh MD  Date of admission: 2022      Subjective      Chief Complaint: Metastatic non-small cell lung cancer      Patient Reports patient is doing fine.  Pending radiation therapy today hopefully home tomorrow if stable with home oxygen.    ROS negative except as above      Objective      Vitals:   Temp:  [97.3 °F (36.3 °C)-97.8 °F (36.6 °C)] 97.6 °F (36.4 °C)  Heart Rate:  [] 90  Resp:  [16-18] 16  BP: (101-137)/(67-76) 111/69  Flow (L/min):  [3.5-5] 3.5    Physical Exam  Vitals reviewed.   Constitutional:       Comments: Wearing 3.5 L nasal cannula  Sitting in chair     HENT:      Head: Normocephalic.   Cardiovascular:      Rate and Rhythm: Normal rate.   Pulmonary:      Effort: Pulmonary effort is normal.      Breath sounds: Wheezing present.      Comments: Right-sided wheeze noted  Abdominal:      General: Abdomen is flat.      Palpations: Abdomen is soft.   Musculoskeletal:         General: Normal range of motion.      Cervical back: Normal range of motion.   Skin:     General: Skin is warm.   Neurological:      General: No focal deficit present.      Mental Status: He is alert and oriented to person, place, and time.   Psychiatric:         Mood and Affect: Mood normal.         Behavior: Behavior normal.             Result Review    Result Review:  I have personally reviewed the results from the time of this admission to 2022 17:22 EDT and agree with these findings:  [x]  Laboratory  []  Microbiology  []  Radiology  []  EKG/Telemetry   []  Cardiology/Vascular   []  Pathology  []  Old records  []  Other:  Most notable findings include: Hyperglycemia          Assessment & Plan      Brief Patient Summary:  Deric Wylie is a 89 y.o. male who presents with respiratory failure due to lung cancer      aspirin, 81 mg,  Oral, Daily  budesonide, 0.5 mg, Nebulization, BID - RT  ipratropium-albuterol, 3 mL, Nebulization, 4x Daily - RT  sodium chloride, 10 mL, Intravenous, Q12H             Active Hospital Problems:  Active Hospital Problems    Diagnosis    • **Shortness of breath    • Malignant neoplasm of upper lobe of right lung (HCC)      Plan:    - pulmonary following, CT removed, monitor for reaccumulation of pleural fluid  - oncology and rad/onc following and plans for radiation now that CT has been removed  - wean oxygen, on 3.5 liters currently, supportive care  - trend labs, pain control  - cont home meds as able  - PT/OT  -Plans to go home with home oxygen hopefully tomorrow  -First radiation therapy treatment today    DVT prophylaxis:  No DVT prophylaxis order currently exists.     CODE STATUS:   Code Status (Patient has no pulse and is not breathing): CPR (Attempt to Resuscitate)  Medical Interventions (Patient has pulse or is breathing): Full Support 09/19/22      Electronically signed by Judson Thomas MD, 09/19/22, 17:22 EDT.  [facility] Hospitalist Team

## 2022-09-19 NOTE — CASE MANAGEMENT/SOCIAL WORK
Continued Stay Note  ANDREI Brumfield     Patient Name: Deric Wylie  MRN: [id number]  Today's Date: 9/19/2022    Admit Date: 9/13/2022     Discharge Plan     Row Name 09/19/22 1109       Plan    Plan D/C plan: Anticipate routine home, declines SNF & HH. Watch for oxygen needs, no home O2.    Patient/Family in Agreement with Plan yes    Plan Comments CM met with pt at bedside to deliver IMM. Declined copy. CM again discussed HH PT, pt continues to decline, states he has someone that comes over and helps him. PT recommended rolling walker for pt to go home with. CM sent secure chat to request MD to place order for walker. CM notified [place] liaison.                   Expected Discharge Date and Time     Expected Discharge Date Expected Discharge Time    Sep 20, 2022           Met with patient in room wearing PPE: mask  Maintained distance greater than six feet and spent less than 15 minutes in the room.        ANNIE TODD RN

## 2022-09-19 NOTE — DISCHARGE PLACEMENT REQUEST
"Deric Wylie (89 y.o. Male)             Date of Birth   02/25/1933    Social Security Number       Address   [address] [place] [facility]    Home Phone   [phone]    MRN   [id number]       [employer]   [employer]    Marital Status                               Admission Date   9/13/22    Admission Type   Emergency    Admitting Provider   Clifford Knight MD    Attending Provider   Judson Thomas MD    Department, Room/Bed   [facility] 3A MEDICAL INPATIENT, 343/1       Discharge Date       Discharge Disposition       Discharge Destination                               Attending Provider: Judson Thomas MD    Allergies: No Known Allergies    Isolation: None   Infection: None   Code Status: CPR   Advance Care Planning Activity    Ht: 180.3 cm (71\")   Wt: 64.3 kg (141 lb 12.8 oz)    Admission Cmt: None   Principal Problem: Shortness of breath [R06.02]                 Active Insurance as of 9/13/2022     Primary Coverage     Payor Plan Insurance Group Employer/Plan Group    MEDICARE MEDICARE A & B      Payor Plan Address Payor Plan Phone Number Payor Plan Fax Number Effective Dates    [address] [phone]  2/1/1998 - None Entered    [facility] [id number]       Subscriber Name Subscriber Birth Date Member ID       DERIC WYLIE 2/25/1933 [id number]           Secondary Coverage     Payor Plan Insurance Group Employer/Plan Group    ANTHEM BLUE CROSS ANTHEM BLUE CROSS BLUE SHIELD PPO [id number]     Payor Plan Address Payor Plan Phone Number Payor Plan Fax Number Effective Dates    [address] [phone]  1/1/2022 - None Entered    [facility] [id number]       Subscriber Name Subscriber Birth Date Member ID       DERIC WYLIE 2/25/1933 [id number]                 Emergency Contacts      (Rel.) Home Phone Work Phone Mobile Phone    Guillermina Reynolds (Neighbor) -- -- [phone]              "

## 2022-09-19 NOTE — THERAPY TREATMENT NOTE
Subjective: Pt agreeable to therapeutic plan of care.    Objective:     Bed mobility - Independent  Transfers - Modified-Independent and with rolling walker  Ambulation - 350 feet Supervision and with rolling walker  Pt needed assist to manage 02 only.  Lateral sway due to bilateral knee varus deformity. Pt with good step through gait pattern.    Vitals: WNL    Pain: 2 VAS soreness where chest tube was  Education: Provided education on importance of mobility and skilled verbal / tactile cueing throughout intervention.     Assessment: Deric Wylie presents with functional mobility impairments which indicate the need for skilled intervention. Good tolerance for activity today.  Pt does remain on 3L NC.  Pt not short of air after ambulating 350 feet with roll walker. Tolerating session today without incident. Will continue to follow and progress as tolerated.     Plan/Recommendations:   Low Intensity Therapy recommended post-acute care - This is recommended as therapy feels this patient would require 2-3 visits per week. Home Health PT would be recommended. Pt requires rolling walker at discharge.     Pt desires Home with Home Health at discharge. Pt cooperative; agreeable to therapeutic recommendations and plan of care.     Basic Mobility 6-click:  Rollin = Total, A lot = 2, A little = 3; 4 = None  Supine>Sit:   1 = Total, A lot = 2, A little = 3; 4 = None   Sit>Stand with arms:  1 = Total, A lot = 2, A little = 3; 4 = None  Bed>Chair:   1 = Total, A lot = 2, A little = 3; 4 = None  Ambulate in room:  1 = Total, A lot = 2, A little = 3; 4 = None  3-5 Steps with railin = Total, A lot = 2, A little = 3; 4 = None  Score: 20    Modified [place]: N/A = No pre-op stroke/TIA    Post-Tx Position: Up in Chair and Call light and personal items within reach   PPE: mask, gloves and eye protection

## 2022-09-19 NOTE — NURSING NOTE
Resident returned from first radiation appointment, second appointment scheduled for tomorrow (9/20/22) @1330. Resident resting in chair, no complaints at this time.

## 2022-09-19 NOTE — PROGRESS NOTES
Hematology oncology progress note    Patient is a 89-year-old male who presented to the hospital with shortness of breath CT findings concerning for right upper lobe mass encasing SVC, pulmonary artery.  Patient had a CT-guided biopsy on 9/14/2022 consistent with invasive moderately differentiated squamous cell carcinoma.  Patient also had a chest tube placement on the right side fluid sent for cytology consistent with malignant effusion.    Subjective  Has had some mild hemoptysis, otherwise stable.decreasing oxygen requirement.    Physical exam   4 lit NC, decreased breath sounds b/l bases. Temporal muscle wasting.    Results -pathology results consistent with invasive moderately differentiated squamous cell carcinoma. Caris pending.      Assessment and plan    Non-small cell lung cancer  Patient has stage IV disease with pleural effusion  Complete staging we obtained CT abdomen, MRI brain, CT abdomen showing left adrenal nodule, infrarenal abdominal aortic aneurysm 5.8 cm.  MRI brain was negative  I have discussed case with radiation oncology with significant shortness of breath, potential for worsening as we pursue molecular testing and the fact this is not a non-small cell lung cancer.  We can consider palliative radiation treatment to start with this will help with his shortness of breath, pain associated with the tumor. This is scheduled for today.  In the meantime we will obtain [employer] ration sequencing with Anoop, PD-L1 analysis plan on starting chemotherapy after completion of radiation.    Now d/c chest tube, will need home oxygen, could be potentially discharged to follow up outpatient for radiation. With hemoptysis, Pulm considering bronchoscopy.    Infrarenal abdominal aortic aneurysm  Vascular surgery outpatient consultation.

## 2022-09-19 NOTE — CASE MANAGEMENT/SOCIAL WORK
OSW notified by Hospital MSW that patient would be bringing in POA.     OSW went to hospital to meet new diagnosis, introduce self/role to the Cancer Center and offer support.     OSW met w/ patient at bedside, introduce self, role, availability and contact information.     Patient reports he should receive the POA tomorrow and he'll bring it to his next visit at the Center.     Patient reports living alone and having support for transportation and other basic needs.     OSW will remain available.     CHRISTOPHER LathamW, CSW, MSW  Oncology MSW  [facility]

## 2022-09-19 NOTE — PLAN OF CARE
Goal Outcome Evaluation:        Bed mobility - Independent  Transfers - Modified-Independent and with rolling walker  Ambulation - 350 feet Supervision and with rolling walker  Pt needed assist to manage 02 only.  Lateral sway due to bilateral knee varus deformity. Pt with good step through gait pattern.    Low Intensity Therapy recommended post-acute care - This is recommended as therapy feels this patient would require 2-3 visits per week. Home Health PT would be recommended. Pt requires rolling walker at discharge.     Pt desires Home with Home Health at discharge. Pt cooperative; agreeable to therapeutic recommendations and plan of care.

## 2022-09-19 NOTE — PLAN OF CARE
Goal Outcome Evaluation:  Plan of Care Reviewed With: patient        Progress: no change   Pt rested through the night no complaints VSS will cont to monitor.

## 2022-09-19 NOTE — ACP (ADVANCE CARE PLANNING)
"Social Work Assessment  [facility]     Patient Name: Deric Wylie  MRN: [id number]  Today's Date: 9/19/2022    Admit Date: 9/13/2022     Discharge Needs Assessment     Row Name 09/19/22 1018       Discharge Needs Assessment    Concerns to be Addressed decision-making    Concerns Comments SW was consulted for ACP. SW called into pt's room, and pt stated he does not want his family contacted. Pt denies having a guardian, is , and has 2 adult children, whom he does not have contact with. Pt stated he is having a will drawn up \"today.\" SW informed him this would need to include a Medical POA, and that  will need a copy of it - pt verbalized understanding. LINNETTE sent SC to Oncology SW to f/u with pt when he presents for OP services. No further needs identified.              Phone communication or documentation only - no physical contact with patient or family.    OSCAR Triplett, LSW  Medical Social Worker  Ph [phone]  Fax [phone]  [email or web address]    "

## 2022-09-20 NOTE — PROGRESS NOTES
Hematology oncology progress note    Patient is a 89-year-old male who presented to the hospital with shortness of breath CT findings concerning for right upper lobe mass encasing SVC, pulmonary artery.  Patient had a CT-guided biopsy on 9/14/2022 consistent with invasive moderately differentiated squamous cell carcinoma.  Patient also had a chest tube placement on the right side fluid sent for cytology consistent with malignant effusion.    Subjective  Mild hemoptysis, improved oxygen requirement.    Physical exam   3 lit NC, decreased breath sounds b/l bases again noted. Temporal muscle wasting.    Results -pathology results consistent with invasive moderately differentiated squamous cell carcinoma. Next generation sequencing pending.      Assessment and plan    Non-small cell lung cancer  Patient has stage IV disease with pleural effusion  Complete staging we obtained CT abdomen, MRI brain, CT abdomen showing left adrenal nodule, infrarenal abdominal aortic aneurysm 5.8 cm.  MRI brain was negative  I have discussed case with radiation oncology with significant shortness of breath, potential for worsening as we pursue molecular testing and the fact this is not a non-small cell lung cancer.  We can consider palliative radiation treatment to start with this will help with his shortness of breath, pain associated with the tumor. This is scheduled for today.  In the meantime we will obtain [employer] sequencing with Anoop PD-L1 analysis plan on starting chemotherapy after completion of radiation.    Now d/c chest tube, will need home oxygen, continue palliative radiation, awaiting next generation sequencing.    Infrarenal abdominal aortic aneurysm  Vascular surgery outpatient consultation.

## 2022-09-20 NOTE — PLAN OF CARE
Problem: Adult Inpatient Plan of Care  Goal: Plan of Care Review  Outcome: Ongoing, Progressing  Goal: Patient-Specific Goal (Individualized)  Outcome: Ongoing, Progressing  Goal: Absence of Hospital-Acquired Illness or Injury  Outcome: Ongoing, Progressing  Intervention: Identify and Manage Fall Risk  Recent Flowsheet Documentation  Taken 9/20/2022 1400 by Lizet Lincoln RN  Safety Promotion/Fall Prevention: patient off unit  Taken 9/20/2022 1205 by Lizet Lincoln RN  Safety Promotion/Fall Prevention: safety round/check completed  Taken 9/20/2022 1000 by Lizet Lincoln RN  Safety Promotion/Fall Prevention: safety round/check completed  Taken 9/20/2022 0800 by Lizet Lincoln RN  Safety Promotion/Fall Prevention: safety round/check completed  Intervention: Prevent and Manage VTE (Venous Thromboembolism) Risk  Recent Flowsheet Documentation  Taken 9/20/2022 0800 by Lizet Lincoln RN  Range of Motion: active ROM (range of motion) encouraged  Intervention: Prevent Infection  Recent Flowsheet Documentation  Taken 9/20/2022 0900 by Lizet Lincoln RN  Infection Prevention:  • single patient room provided  • rest/sleep promoted  • hand hygiene promoted  • visitors restricted/screened  Goal: Optimal Comfort and Wellbeing  Outcome: Ongoing, Progressing  Goal: Readiness for Transition of Care  Outcome: Ongoing, Progressing     Problem: COPD (Chronic Obstructive Pulmonary Disease) Comorbidity  Goal: Maintenance of COPD Symptom Control  Outcome: Ongoing, Progressing   Goal Outcome Evaluation:     Radiation today, states decreased appetite. No observed hemoptysis at this time. On 2L of oxygen via humidified nasal canula. No complaints of pain.

## 2022-09-20 NOTE — PROGRESS NOTES
"PULMONARY CRITICAL CARE Progress  NOTE      PATIENT IDENTIFICATION:  Name: Deric Wylie  MRN: [id number]  :  1933     Age: 89 y.o.  Sex: male    DATE OF Note:  2022   Referring Physician: Clifford Knight MD                  Subjective:   Still having significant hemoptysis  No SOB, no chest or abdominal pain, no nausea or vomiting, no bowel or bladder issues reported    Objective:  tMax 24 hrs: Temp (24hrs), Av.7 °F (36.5 °C), Min:97.4 °F (36.3 °C), Max:97.9 °F (36.6 °C)      Vitals Ranges:   Temp:  [97.4 °F (36.3 °C)-97.9 °F (36.6 °C)] 97.7 °F (36.5 °C)  Heart Rate:  [] 95  Resp:  [16-18] 18  BP: ()/(61-72) 120/72    Intake and Output Last 3 Shifts:   I/O last 3 completed shifts:  In: 800 [P.O.:800]  Out: 1040 [Urine:1040]    Exam:  /72 (BP Location: Right arm, Patient Position: Lying)   Pulse 95   Temp 97.7 °F (36.5 °C) (Oral)   Resp 18   Ht 180.3 cm (71\")   Wt 64.3 kg (141 lb 12.8 oz)   SpO2 95%   BMI 19.78 kg/m²     General Appearance:   Alert  HEENT:  Normocephalic, without obvious abnormality, Conjunctivae/corneas clear.  Normal external ear canals, nares normal, no drainage     Neck:  Supple, symmetrical, trachea midline. No JVD.  Lungs /Chest wall:   Bilateral basal rhonchi, respirations unlabored, symmetrical wall movement.     Heart:  Regular rate and rhythm, systolic murmur PMI left sternal border  Abdomen: Soft, nontender, no masses, no organomegaly.    Extremities: Trace edema, no clubbing or cyanosis        Medications:    Current Facility-Administered Medications:   •  acetaminophen (TYLENOL) tablet 650 mg, 650 mg, Oral, Q4H PRN, 650 mg at 22 1612 **OR** acetaminophen (TYLENOL) 160 MG/5ML solution 650 mg, 650 mg, Oral, Q4H PRN **OR** acetaminophen (TYLENOL) suppository 650 mg, 650 mg, Rectal, Q4H PRN, Guillermina Garcia, APRN  •  aluminum-magnesium hydroxide-simethicone (MAALOX MAX) 400-400-40 MG/5ML suspension 15 mL, 15 mL, Oral, Q6H PRN, Tiffanie, " Kiara KUMAR MD, 15 mL at 09/19/22 2354  •  aspirin chewable tablet 81 mg, 81 mg, Oral, Daily, Bernardo Lorenzo MD, 81 mg at 09/20/22 0803  •  budesonide (PULMICORT) nebulizer solution 0.5 mg, 0.5 mg, Nebulization, BID - RT, Radha Holman APRN, 0.5 mg at 09/20/22 0718  •  ipratropium-albuterol (DUO-NEB) nebulizer solution 3 mL, 3 mL, Nebulization, Q4H PRN, Guillermina Garcia, APRN  •  ipratropium-albuterol (DUO-NEB) nebulizer solution 3 mL, 3 mL, Nebulization, 4x Daily - RT, Radha Holman APRN, 3 mL at 09/20/22 0718  •  ondansetron (ZOFRAN) tablet 4 mg, 4 mg, Oral, Q6H PRN **OR** ondansetron (ZOFRAN) injection 4 mg, 4 mg, Intravenous, Q6H PRN, Carlos Garciaen, APRN  •  sodium chloride 0.9 % flush 10 mL, 10 mL, Intravenous, Q12H, Guillermina Garcia, APRN, 10 mL at 09/20/22 0803  •  sodium chloride 0.9 % flush 10 mL, 10 mL, Intravenous, PRN, Guillermina Garcia, APRN    Data Review:  All labs (24hrs):   Recent Results (from the past 24 hour(s))   Rad Onc Aria Session Summary    Collection Time: 09/19/22  4:11 PM   Result Value Ref Range    Course ID C1     Course Start Date 9/15/2022  4:23 PM     Course First Treatment Date 9/19/2022  4:08 PM     Course Last Treatment Date 9/19/2022  4:09 PM     Course Elapsed Days 0     Reference Point ID RtUpperLobe     Reference Point Dosage Given to Date 4 Gy    Reference Point Session Dosage Given 4 Gy    Plan ID RtUpperLung     Plan Fractions Treated to Date 1     Plan Total Fractions Prescribed 5     Plan Prescribed Dose Per Fraction 4 Gy    Plan Total Prescribed Dose 2,000 cGy    Plan Primary Reference Point RtUpperLobe    Comprehensive Metabolic Panel    Collection Time: 09/20/22 12:01 AM    Specimen: Blood   Result Value Ref Range    Glucose 160 (H) 65 - 99 mg/dL    BUN 18 8 - 23 mg/dL    Creatinine 0.89 0.76 - 1.27 mg/dL    Sodium 133 (L) 136 - 145 mmol/L    Potassium 4.7 3.5 - 5.2 mmol/L    Chloride 95 (L) 98 - 107 mmol/L    CO2 31.0 (H) 22.0 - 29.0 mmol/L    Calcium 11.8 (H)  8.6 - 10.5 mg/dL    Total Protein 6.4 6.0 - 8.5 g/dL    Albumin 3.20 (L) 3.50 - 5.20 g/dL    ALT (SGPT) 34 1 - 41 U/L    AST (SGOT) 33 1 - 40 U/L    Alkaline Phosphatase 107 39 - 117 U/L    Total Bilirubin 0.5 0.0 - 1.2 mg/dL    Globulin 3.2 gm/dL    A/G Ratio 1.0 g/dL    BUN/Creatinine Ratio 20.2 7.0 - 25.0    Anion Gap 7.0 5.0 - 15.0 mmol/L    eGFR 81.9 >60.0 mL/min/1.73   Magnesium    Collection Time: 09/20/22 12:01 AM    Specimen: Blood   Result Value Ref Range    Magnesium 2.2 1.6 - 2.4 mg/dL   Phosphorus    Collection Time: 09/20/22 12:01 AM    Specimen: Blood   Result Value Ref Range    Phosphorus 2.7 2.5 - 4.5 mg/dL   CBC Auto Differential    Collection Time: 09/20/22 12:01 AM    Specimen: Blood   Result Value Ref Range    WBC 7.60 3.40 - 10.80 10*3/mm3    RBC 5.50 4.14 - 5.80 10*6/mm3    Hemoglobin 15.2 13.0 - 17.7 g/dL    Hematocrit 47.5 37.5 - 51.0 %    MCV 86.4 79.0 - 97.0 fL    MCH 27.6 26.6 - 33.0 pg    MCHC 32.0 31.5 - 35.7 g/dL    RDW 15.1 12.3 - 15.4 %    RDW-SD 45.5 37.0 - 54.0 fl    MPV 8.9 6.0 - 12.0 fL    Platelets 180 140 - 450 10*3/mm3    Neutrophil % 76.2 (H) 42.7 - 76.0 %    Lymphocyte % 11.0 (L) 19.6 - 45.3 %    Monocyte % 10.7 5.0 - 12.0 %    Eosinophil % 1.5 0.3 - 6.2 %    Basophil % 0.6 0.0 - 1.5 %    Neutrophils, Absolute 5.80 1.70 - 7.00 10*3/mm3    Lymphocytes, Absolute 0.80 0.70 - 3.10 10*3/mm3    Monocytes, Absolute 0.80 0.10 - 0.90 10*3/mm3    Eosinophils, Absolute 0.10 0.00 - 0.40 10*3/mm3    Basophils, Absolute 0.00 0.00 - 0.20 10*3/mm3    nRBC 0.0 0.0 - 0.2 /100 WBC        Imaging:  XR Chest 1 View  Narrative:    DATE OF EXAM:   9/20/2022 4:20 AM     PROCEDURE:   XR CHEST 1 VW-     INDICATIONS:   Shortness of breath; R06.02-Shortness of breath; R09.02-Hypoxemia;  R91.8-Other nonspecific abnormal finding of lung field     COMPARISON:  09/18/2022     TECHNIQUE:   Portable chest radiograph.     FINDINGS:    Redemonstration of a right paratracheal upper lobe mass. Stable  elevation  of the right hemidiaphragm. Heart size normal. Advanced  emphysema. No pneumothorax. Small right pleural effusion. No significant  effusion on the left.     Impression: 1. No change in right upper lobe paratracheal mass.  2. Mild bibasilar airspace disease likely atelectasis with small right  pleural effusion.  3. Severe emphysema.     Electronically Signed By-Jayant Garcia MD On:9/20/2022 7:28 AM  This report was finalized on [id number] by  Jayant Garcia MD.       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    Franklin Lowery MD   9/20/2022  12:46 EDT

## 2022-09-20 NOTE — PLAN OF CARE
Goal Outcome Evaluation:  Plan of Care Reviewed With: patient        Progress: no change   Pt rested well through the night. Pt to have radiation today @ 1330. O2 wean down to 2LHF sat at 96%. Pt VSS will cont to monitor.

## 2022-09-20 NOTE — PROGRESS NOTES
[facility] Medicine Services Daily Progress Note    Patient Name: Deric Wylie  : 1933  MRN: [id number]  Primary Care Physician:  Bernardo Singh MD  Date of admission: 2022      Subjective    Chief Complaint: Metastatic non-small cell lung cancer        Patient Reports patient is doing fine.  Pending another radiation therapy today hopefully home tomorrow if stable with home oxygen and no hemoptysis     ROS negative except as above       Objective      Vitals:   Temp:  [97.4 °F (36.3 °C)-97.9 °F (36.6 °C)] 97.7 °F (36.5 °C)  Heart Rate:  [] 94  Resp:  [16-18] 18  BP: ()/(61-72) 120/72  Flow (L/min):  [2-4.5] 2    Physical Exam  Vitals reviewed.   Constitutional:       Comments: Wearing 3.5 L nasal cannula   laying in bed today mildly labored.  States he was just moving     HENT:      Head: Normocephalic.   Cardiovascular:      Rate and Rhythm: Normal rate.   Pulmonary:      Effort: Pulmonary effort is normal.      Breath sounds: Wheezing present.      Comments: Right-sided wheeze noted  Abdominal:      General: Abdomen is flat.      Palpations: Abdomen is soft.   Musculoskeletal:         General: Normal range of motion.      Cervical back: Normal range of motion.   Skin:     General: Skin is warm.   Neurological:      General: No focal deficit present.      Mental Status: He is alert and oriented to person, place, and time.   Psychiatric:         Mood and Affect: Mood normal.         Behavior: Behavior normal.        Result Review    Result Review:  I have personally reviewed the results from the time of this admission to 2022 16:08 EDT and agree with these findings:  [x]  Laboratory  []  Microbiology  []  Radiology  []  EKG/Telemetry   []  Cardiology/Vascular   []  Pathology  []  Old records  []  Other:  Most notable findings include: Lab work normal           Assessment & Plan       Brief Patient Summary:  Deric Wylie is a 89 y.o. male who presents  with respiratory failure due to lung cancer        aspirin, 81 mg, Oral, Daily  budesonide, 0.5 mg, Nebulization, BID - RT  ipratropium-albuterol, 3 mL, Nebulization, 4x Daily - RT  sodium chloride, 10 mL, Intravenous, Q12H            Active Hospital Problems:       Active Hospital Problems     Diagnosis     • **Shortness of breath     • Malignant neoplasm of upper lobe of right lung (HCC)        Plan:    - pulmonary following, chest tube removed, monitor for reaccumulation of pleural fluid  - oncology and rad/onc following and plans for radiation now that CT has been removed  - wean oxygen, on 3.5 liters currently, supportive care  - trend labs, pain control  - cont home meds as able  - PT/OT  -Plans to go home with home oxygen hopefully tomorrow  -First radiation therapy treatment September 19.  Had another on September 20  -Hemoptysis improving.  Hopefully home tomorrow     DVT prophylaxis:  No DVT prophylaxis order currently exists.     CODE STATUS:   Code Status (Patient has no pulse and is not breathing): CPR (Attempt to Resuscitate)  Medical Interventions (Patient has pulse or is breathing): Full Support    09/20/22      Electronically signed by Judson Thomas MD, 09/20/22, 16:08 EDT.  [facility] Hospitalist Team

## 2022-09-21 NOTE — DISCHARGE SUMMARY
[facility] Medicine Services  DISCHARGE SUMMARY    Patient Name: Deric Wylie  : 1933  MRN: [id number]    Discharge condition: Stabilized  Date of Admission: 2022  Discharge Diagnosis: Lung cancer shortness of breath respiratory failure  Date of Discharge: 2022  Primary Care Physician: Bernardo Singh MD      Presenting Problem:   Shortness of breath [R06.02]  Hypoxia [R09.02]  Mass of upper lobe of right lung [R91.8]    Active and Resolved Hospital Problems:  Active Hospital Problems    Diagnosis POA   • **Shortness of breath [R06.02] Yes   • Malignant neoplasm of upper lobe of right lung (HCC) [C34.11] Unknown      Resolved Hospital Problems   No resolved problems to display.         Hospital Course     Hospital Course:  Deric Wylie is a 89 y.o. male who presents to the hospital with shortness of breath and was found to have non-small cell lung cancer.  Patient was seen by oncology and pulmonary.  The patient was receiving radiation therapy and was improving.  He required supplemental oxygen and had to be set up with home oxygen tank.  Once he was cleared by pulmonary and oncology and was doing well he received several treatments of radiation therapy and was sent home in stable condition with a oxygen tank.  He was instructed to follow-up very closely with pulmonary and oncology.            Reasons For Change In Medications and Indications for New Medications:      Day of Discharge     Vital Signs:  Temp:  [97.3 °F (36.3 °C)-98.2 °F (36.8 °C)] 98.2 °F (36.8 °C)  Heart Rate:  [] 113  Resp:  [16-22] 20  BP: (122-139)/(68-75) 122/69  Flow (L/min):  [2-3] 3    Physical Exam:  Physical Exam  Vitals reviewed.   Constitutional:       Comments: Sitting in the chair wearing nasal cannula   HENT:      Head: Normocephalic.   Cardiovascular:      Rate and Rhythm: Normal rate.   Pulmonary:      Effort: Pulmonary effort is normal.      Breath sounds: Wheezing  present.      Comments: Mild right-sided wheeze  Abdominal:      General: Abdomen is flat.      Palpations: Abdomen is soft.   Musculoskeletal:         General: Normal range of motion.      Cervical back: Normal range of motion.   Skin:     General: Skin is warm.   Neurological:      General: No focal deficit present.      Mental Status: He is alert and oriented to person, place, and time.   Psychiatric:         Mood and Affect: Mood normal.         Behavior: Behavior normal.            Pertinent  and/or Most Recent Results     LAB RESULTS:      Lab 09/20/22  0001 09/18/22  1355   WBC 7.60 7.90   HEMOGLOBIN 15.2 16.1   HEMATOCRIT 47.5 49.9   PLATELETS 180 185   NEUTROS ABS 5.80 6.40   LYMPHS ABS 0.80 0.80   MONOS ABS 0.80 0.60   EOS ABS 0.10 0.10   MCV 86.4 87.3         Lab 09/20/22  0001 09/18/22  1355   SODIUM 133* 131*   POTASSIUM 4.7 5.0   CHLORIDE 95* 93*   CO2 31.0* 32.0*   ANION GAP 7.0 6.0   BUN 18 13   CREATININE 0.89 0.73*   EGFR 81.9 87.0   GLUCOSE 160* 211*   CALCIUM 11.8* 11.8*   MAGNESIUM 2.2  --    PHOSPHORUS 2.7  --          Lab 09/20/22  0001 09/18/22  1355   TOTAL PROTEIN 6.4 6.8   ALBUMIN 3.20* 3.30*   GLOBULIN 3.2 3.5   ALT (SGPT) 34 22   AST (SGOT) 33 24   BILIRUBIN 0.5 0.7   ALK PHOS 107 108                     Brief Urine Lab Results     None        Microbiology Results (last 10 days)     Procedure Component Value - Date/Time    AFB Culture - Body Fluid, Pleural Cavity [[id number]] Collected: 09/14/22 1000    Lab Status: Preliminary result Specimen: Body Fluid from Pleural Cavity Updated: 09/21/22 1017     AFB Culture No AFB isolated at 1 week     AFB Stain No acid fast bacilli seen    Body Fluid Culture - Body Fluid, Pleural Cavity [[id number]] Collected: 09/14/22 1000    Lab Status: Final result Specimen: Body Fluid from Pleural Cavity Updated: 09/19/22 0655     Body Fluid Culture No growth at 5 days     Gram Stain Few (2+) WBCs per low power field      No organisms seen    Anaerobic Culture -  Pleural Fluid, Pleural Cavity [[id number]]  (Normal) Collected: 09/14/22 1000    Lab Status: Final result Specimen: Pleural Fluid from Pleural Cavity Updated: 09/19/22 0654     Anaerobic Culture No anaerobes isolated at 5 days    Respiratory Panel PCR w/COVID-19(SARS-CoV-2) DELFINA/TERENCE/[facility]YN/PAD/COR/MAD/DANIEL In-House, NP Swab in UTM/VTM, 3-4 HR TAT - Swab, Nasopharynx [[id number]]  (Normal) Collected: 09/13/22 1642    Lab Status: Final result Specimen: Swab from Nasopharynx Updated: 09/13/22 1746     ADENOVIRUS, PCR Not Detected     Coronavirus 229E Not Detected     Coronavirus HKU1 Not Detected     Coronavirus NL63 Not Detected     Coronavirus OC43 Not Detected     COVID19 Not Detected     Human Metapneumovirus Not Detected     Human Rhinovirus/Enterovirus Not Detected     Influenza A PCR Not Detected     Influenza B PCR Not Detected     Parainfluenza Virus 1 Not Detected     Parainfluenza Virus 2 Not Detected     Parainfluenza Virus 3 Not Detected     Parainfluenza Virus 4 Not Detected     RSV, PCR Not Detected     Bordetella pertussis pcr Not Detected     Bordetella parapertussis PCR Not Detected     Chlamydophila pneumoniae PCR Not Detected     Mycoplasma pneumo by PCR Not Detected    Narrative:      In the setting of a positive respiratory panel with a viral infection PLUS a negative procalcitonin without other underlying concern for bacterial infection, consider observing off antibiotics or discontinuation of antibiotics and continue supportive care. If the respiratory panel is positive for atypical bacterial infection (Bordetella pertussis, Chlamydophila pneumoniae, or Mycoplasma pneumoniae), consider antibiotic de-escalation to target atypical bacterial infection.    Blood Culture - Blood, Arm, Right [[id number]]  (Normal) Collected: 09/13/22 1639    Lab Status: Final result Specimen: Blood from Arm, Right Updated: 09/18/22 1647     Blood Culture No growth at 5 days    Blood Culture - Blood, Arm, Left [[id number]]   (Normal) Collected: 09/13/22 1617    Lab Status: Final result Specimen: Blood from Arm, Left Updated: 09/18/22 1633     Blood Culture No growth at 5 days          CT Chest With Contrast Diagnostic    Result Date: 9/13/2022  Impression: 1. 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  Mass invades the right paratracheal mediastinum with occlusion of right subclavian vein and distal right internal jugular vein. Mild narrowing of the SVC. Mass extends to the right hilum with encasement of right upper lobe bronchus and severe narrowing and near occlusion of the right upper lobe pulmonary artery. 2. Large right pleural effusion with near complete collapse of the right lower lobe. 3. Severe emphysema with multiple additional pulmonary nodules concerning for pulmonary metastatic disease. 4. Extensive coronary and aortic atherosclerotic disease. 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 6. Left adrenal gland 1.7 cm nodule may relate to adrenal metastatic disease or adenoma, new from 2014. 7. Additional incidental findings above.  I discussed the critical findings above with Dr. Mazariegos at 2:44 p.m. on 9/13/2022.   Electronically Signed By-Jayant Garcia MD On:9/13/2022 4:25 PM This report was finalized on [id number] by  Jayant Garcia MD.    MRI Brain With & Without Contrast    Result Date: 9/15/2022  Impression:  1. Motion limited exam 2. No acute ischemic change 3. No abnormal enhancement 4. Paranasal sinus disease 5. White matter changes which are nonspecific but most compatible small vessel ischemic disease in this age group   Electronically Signed By-Lio Robbins On:9/15/2022 3:09 PM This report was finalized on [id number] by  Lio Robbins, .    CT Abdomen Pelvis With Contrast    Result Date: 9/15/2022  Impression: 1. Fusiform infrarenal abdominal aortic aneurysm measuring up to 5.6 cm in size. Recommend  vascular surgery consultation due to its size. 2. Indeterminant 1.8 cm left adrenal nodule which is new from 2014. In the setting of pulmonary malignancy, this is indeterminant. Consider follow-up assessment with PET/CT. CT or MRI adrenal protocol could also be considered but may be less helpful than PET. 3. Right basilar chest tube with trace right-sided pleural effusion and tiny anterior right basilar pneumothorax.    Electronically Signed By-Jc Nichols MD On:9/15/2022 2:52 PM This report was finalized on [id number] by  Jc Nichlos MD.    XR Chest 1 View    Result Date: 9/21/2022  Impression: 1. Redemonstration of right upper lobe paratracheal mass. 2. Persistent bibasilar airspace disease which may relate to atelectasis with elevated right hemidiaphragm. 3. Advanced emphysema. 4. Small right pleural effusion.  Electronically Signed By-Jayant Garcia MD On:9/21/2022 10:17 AM This report was finalized on [id number] by  Jayant Garcia MD.    XR Chest 1 View    Result Date: 9/20/2022  Impression: 1. No change in right upper lobe paratracheal mass. 2. Mild bibasilar airspace disease likely atelectasis with small right pleural effusion. 3. Severe emphysema.  Electronically Signed By-Jayant Garcia MD On:9/20/2022 7:28 AM This report was finalized on [id number] by  Jayant Garcia MD.    XR Chest 1 View    Result Date: 9/18/2022  Impression:   1.  Patchy right perihilar airspace disease which may be secondary to atelectasis or pneumonia. 2.  No change in right upper lobe mass. 3.  Emphysema.   Electronically Signed By-John Nelson MD On:9/18/2022 8:40 AM This report was finalized on [id number] by  John Nelson MD.    XR Chest 1 View    Result Date: 9/17/2022  Impression:   1.  Interval removal of right-sided thoracostomy tube.  No evidence pneumothorax or pleural effusion. 2.  No change in right upper lobe mass. 3.  Improving aeration within the left lung base.   Electronically Signed By-John  MD Omar On:9/17/2022 10:25 AM This report was finalized on [id number] by  John Nelson MD.    XR Chest 1 View    Result Date: 9/17/2022  Impression:   1.  No change in right-sided thoracostomy tube.  No evidence of pleural effusion or pneumothorax. 2.  No change in right upper lobe mass.   Electronically Signed By-John Nelson MD On:9/17/2022 8:01 AM This report was finalized on [id number] by  John Nelson MD.    XR Chest 1 View    Result Date: 9/16/2022  Impression: 1. Chest tube stable overlying the right lung base. No pneumothorax. 2. Redemonstration of right upper lobe paratracheal mass. 3. Advanced emphysema and additional chronic findings above.  Electronically Signed By-Jayant Garcia MD On:9/16/2022 7:18 AM This report was finalized on [id number] by  Jayant Garcia MD.    XR Chest 1 View    Result Date: 9/15/2022  Impression: 1. Right-sided smallbore chest tube in unchanged position within the basilar aspect of the right hemithorax. No pneumothorax. 2. Unchanged masslike opacity within the right apex with elevation of the right hemidiaphragm.  Electronically Signed By-Jc Nichols MD On:9/15/2022 8:15 AM This report was finalized on [id number] by  Jc Nichols MD.    XR Chest 1 View    Result Date: 9/14/2022  Impression:   1.  Interval placement of small-caliber right thoracostomy tube.  No evidence pneumothorax.  There is been complete evacuation of the right-sided pleural effusion. 2.  No change in right upper lobe mass.   Electronically Signed By-John Nelson MD On:9/14/2022 10:33 AM This report was finalized on [id number] by  John Nelson MD.    CT Needle Biopsy Lung    Result Date: 9/14/2022  Impression: Technically successful CT-guided 18-gauge core biopsy, anteromedially located right upper lobe lung mass, as described above.  Electronically Signed By-Henrietta Le MD On:9/14/2022 2:09 PM This report was finalized on [id number] by  Henrietta Le MD.    XR  Chest PA & Lateral    Result Date: 8/29/2022  Impression: Masslike opacity right upper lobe suspicious for neoplasm. Moderate size right pleural effusion with consolidation right lower lobe. CT chest with contrast is recommended for better evaluation.  Electronically Signed By-Vilma Moore MD On:8/29/2022 5:21 PM This report was finalized on [id number] by  Vilma Moore MD.                  Labs Pending at Discharge:  Pending Labs     Order Current Status    ADDITIONAL PATHOLOGY REQUEST In process    AFB Culture - Body Fluid, Pleural Cavity Preliminary result          Procedures Performed           Consults:   Consults     Date and Time Order Name Status Description    9/13/2022 10:31 PM Hematology & Oncology Inpatient Consult Completed     9/13/2022 10:18 PM Inpatient Pulmonology Consult Completed     9/13/2022  5:13 PM Hospitalist (on-call MD unless specified)              Discharge Details        Discharge Medications      Continue These Medications      Instructions Start Date   aspirin 81 MG chewable tablet   81 mg, Oral, Daily             No Known Allergies      Discharge Disposition:   Home or Self Care    Diet:  Hospital:  Diet Order   Procedures   • Diet Regular         Discharge Activity:         CODE STATUS:  Code Status and Medical Interventions:   Ordered at: 09/13/22 1912     Code Status (Patient has no pulse and is not breathing):    CPR (Attempt to Resuscitate)     Medical Interventions (Patient has pulse or is breathing):    Full Support         Future Appointments   Date Time Provider Department Center   9/23/2022  1:15 PM Jadon Milner MD [facility] None           Time spent on Discharge including face to face service:  45 minutes    This patient has been examined wearing appropriate Personal Protective Equipment and discussed with Patient. 09/21/22      Signature: Judson Thomas MD

## 2022-09-21 NOTE — PLAN OF CARE
Problem: Adult Inpatient Plan of Care  Goal: Plan of Care Review  Outcome: Ongoing, Progressing  Goal: Patient-Specific Goal (Individualized)  Outcome: Ongoing, Progressing  Goal: Absence of Hospital-Acquired Illness or Injury  Outcome: Ongoing, Progressing  Goal: Optimal Comfort and Wellbeing  Outcome: Ongoing, Progressing  Goal: Readiness for Transition of Care  Outcome: Ongoing, Progressing     Problem: COPD (Chronic Obstructive Pulmonary Disease) Comorbidity  Goal: Maintenance of COPD Symptom Control  Outcome: Ongoing, Progressing     Problem: Skin Injury Risk Increased  Goal: Skin Health and Integrity  Outcome: Ongoing, Progressing   Goal Outcome Evaluation:                 Patient had radiation today. No hemoptysis noted. No c/o discomfort this shift. Plan to discharge in the morning. Will continue to observe.

## 2022-09-21 NOTE — PROGRESS NOTES
"PULMONARY CRITICAL CARE Progress  NOTE      PATIENT IDENTIFICATION:  Name: Deric Wylie  MRN: [id number]  :  1933     Age: 89 y.o.  Sex: male    DATE OF Note:  2022   Referring Physician: Clifford Knight MD                  Subjective:   Feeling better, sitting in chair at bedside, no hemoptysis today, on room air, no chest or abdominal pain, no bowel or bladder issues, wants to go home but states he does not have a ride till tomorrow     Objective:  tMax 24 hrs: Temp (24hrs), Av.6 °F (36.4 °C), Min:97.3 °F (36.3 °C), Max:98.2 °F (36.8 °C)      Vitals Ranges:   Temp:  [97.3 °F (36.3 °C)-98.2 °F (36.8 °C)] 98.2 °F (36.8 °C)  Heart Rate:  [] 113  Resp:  [16-22] 20  BP: (122-139)/(68-75) 122/69    Intake and Output Last 3 Shifts:   I/O last 3 completed shifts:  In: -   Out: 1260 [Urine:1260]    Exam:  /69 (BP Location: Right arm, Patient Position: Sitting)   Pulse 113   Temp 98.2 °F (36.8 °C) (Oral)   Resp 20   Ht 180.3 cm (71\")   Wt 64.3 kg (141 lb 12.8 oz)   SpO2 95%   BMI 19.78 kg/m²     General Appearance: AAO  HEENT:  Normocephalic, without obvious abnormality, Conjunctivae/corneas clear.  Normal external ear canals, nares normal, no drainage     Neck:  Supple, symmetrical, trachea midline. No JVD.  Lungs /Chest wall:   Bilateral basal rhonchi, respirations unlabored, symmetrical wall movement.     Heart:  Regular rate and rhythm, systolic murmur PMI left sternal border  Abdomen: Soft, nontender, no masses, no organomegaly.    Extremities: Trace edema, no clubbing or cyanosis        Medications:    Current Facility-Administered Medications:     acetaminophen (TYLENOL) tablet 650 mg, 650 mg, Oral, Q4H PRN, 650 mg at 22 1612 **OR** acetaminophen (TYLENOL) 160 MG/5ML solution 650 mg, 650 mg, Oral, Q4H PRN **OR** acetaminophen (TYLENOL) suppository 650 mg, 650 mg, Rectal, Q4H PRN, Guillermina Garcia APRN    aluminum-magnesium hydroxide-simethicone (MAALOX MAX) 400-400-40 " MG/5ML suspension 15 mL, 15 mL, Oral, Q6H PRN, Kiara Moreno MD, 15 mL at 09/19/22 2354    aspirin chewable tablet 81 mg, 81 mg, Oral, Daily, BjBernardo MD, 81 mg at 09/21/22 0858    budesonide (PULMICORT) nebulizer solution 0.5 mg, 0.5 mg, Nebulization, BID - RT, Radha Holman APRN, 0.5 mg at 09/21/22 0658    ipratropium-albuterol (DUO-NEB) nebulizer solution 3 mL, 3 mL, Nebulization, Q4H PRN, Guillermina Garcia APRN    ipratropium-albuterol (DUO-NEB) nebulizer solution 3 mL, 3 mL, Nebulization, TID - RT, Judson Thomas MD, 3 mL at 09/21/22 1120    ondansetron (ZOFRAN) tablet 4 mg, 4 mg, Oral, Q6H PRN **OR** ondansetron (ZOFRAN) injection 4 mg, 4 mg, Intravenous, Q6H PRN, Guillermina Garcia, APRVIRGEN    sodium chloride 0.9 % flush 10 mL, 10 mL, Intravenous, Q12H, Guillermina Garcia APRN, 10 mL at 09/21/22 0858    sodium chloride 0.9 % flush 10 mL, 10 mL, Intravenous, PRN, Guillermina Garcia, APRVIRGEN    Data Review:  All labs (24hrs):   Recent Results (from the past 24 hour(s))   Rad Onc Aria Session Summary    Collection Time: 09/21/22 12:38 PM   Result Value Ref Range    Course ID C1     Course Start Date 9/15/2022  4:23 PM     Course First Treatment Date 9/19/2022  4:08 PM     Course Last Treatment Date 9/21/2022 12:37 PM     Course Elapsed Days 2     Reference Point ID RtUpperLobe     Reference Point Dosage Given to Date 12 Gy    Reference Point Session Dosage Given 4 Gy    Plan ID RtUpperLung     Plan Fractions Treated to Date 3     Plan Total Fractions Prescribed 5     Plan Prescribed Dose Per Fraction 4 Gy    Plan Total Prescribed Dose 2,000 cGy    Plan Primary Reference Point RtUpperLobe         Imaging:  XR Chest 1 View  Narrative:    DATE OF EXAM:   9/21/2022 5:10 AM     PROCEDURE:   XR CHEST 1 VW-     INDICATIONS:   Shortness of breath; R06.02-Shortness of breath; R09.02-Hypoxemia;  R91.8-Other nonspecific abnormal finding of lung field     COMPARISON:  09/20/2022     TECHNIQUE:   Portable chest  radiograph.     FINDINGS:   Stable elevation of the right hemidiaphragm. Redemonstration of a right  upper lobe paratracheal mass. Advanced emphysema. Bibasilar  atelectasis/scarring similar to the prior study. There is a chronic  linear scarring at the left upper lobe. Dense aortic arch at there is  chronic calcifications. Small right pleural effusion.     Impression: 1. Redemonstration of right upper lobe paratracheal mass.  2. Persistent bibasilar airspace disease which may relate to atelectasis  with elevated right hemidiaphragm.  3. Advanced emphysema.  4. Small right pleural effusion.     Electronically Signed By-Jayant Garcia MD On:9/21/2022 10:17 AM  This report was finalized on [id number] by  Jayant Garcia MD.       ASSESSMENT:  Right upper lobe squamous cell carcinoma  Right upper lobe paratracheal mass  Large right pleural effusion  COPD       PLAN:  May discharge in a.m. and follow-up with our service in 2 weeks  Bronchodilator  Inhaled corticosteroids  Incentive spirometer  Electrolytes/ glycemic control  GI prophylaxis    Discussed with Dr. Beverley Holman, APRN   9/21/2022  17:12 EDT     I personally have examined  and interviewed the patient. I have reviewed the history, data, problems, assessment and plan with our NP.  Critical care time in direct medical management (   ) minutes  Electronically signed by Franklin Lowery MD. D, ABSM.

## 2022-09-21 NOTE — PROGRESS NOTES
Exercise Oximetry    Patient Name:Deric Wylie   MRN: [id number]   Date: 09/21/22             ROOM AIR BASELINE   SpO2% 86   Heart Rate 106   Blood Pressure      EXERCISE ON ROOM AIR SpO2% EXERCISE ON O2 @ 3 LPM SpO2%   1 MINUTE  1 MINUTE    2 MINUTES  2 MINUTES    3 MINUTES  3 MINUTES    4 MINUTES  4 MINUTES    5 MINUTES  5 MINUTES    6 MINUTES  6 MINUTES               Distance Walked   Distance Walked   Dyspnea (Rodriguez Scale)   Dyspnea (Rodriguez Scale)   Fatigue (Rodriguez Scale)   Fatigue (Rodriguez Scale)   SpO2% Post Exercise   SpO2% Post Exercise   HR Post Exercise   HR Post Exercise   Time to Recovery   Time to Recovery     Comments: Patient's 02 sat dropped to 86% on room air for 5 minutes,(done at rest).  Placed 02 back on patient at 2 liters, sat increased to 90%, increased 02 to 3 liters, sat increased to 94% on 3 liters 02.

## 2022-09-21 NOTE — CASE MANAGEMENT/SOCIAL WORK
Continued Stay Note   [facility]     Patient Name: Deric Wylie  MRN: [id number]  Today's Date: 9/21/2022    Admit Date: 9/13/2022    Plan: D/C plan: Anticipate routine home. Declines SNF & HH. New home oxygen needs.   Discharge Plan     Row Name 09/21/22 1137       Plan    Plan D/C plan: Anticipate routine home. Declines SNF & HH. New home oxygen needs.    Patient/Family in Agreement with Plan yes    Plan Comments CM received notification that pt had respiratory test for oxygen, now requires 3L for home oxygen use. CM requested order from Dr. Thomas for home oxygen. CM notified [place] liaison of new oxygen need to be delivered at bedside prior to d/c.                   Expected Discharge Date and Time     Expected Discharge Date Expected Discharge Time    Sep 21, 2022       Phone communication or documentation only - no physical contact with patient or family.      ANNIE TODD RN

## 2022-09-21 NOTE — PROGRESS NOTES
Hematology oncology progress note    Patient is a 89-year-old male who presented to the hospital with shortness of breath CT findings concerning for right upper lobe mass encasing SVC, pulmonary artery.  Patient had a CT-guided biopsy on 9/14/2022 consistent with invasive moderately differentiated squamous cell carcinoma.  Patient also had a chest tube placement on the right side fluid sent for cytology consistent with malignant effusion. This was eventually removed.    Subjective  Hemoptysis is improved, tolerating radiation treatment well, decreasing o2 requirement    Physical exam   3 lit NC, temporal muscle wasting, decreased bibasilar breath sounds.    Results -pathology results consistent with invasive moderately differentiated squamous cell carcinoma. Next generation sequencing pending. Cbc stable      Assessment and plan    Non-small cell lung cancer  Patient has stage IV disease with pleural effusion  Complete staging we obtained CT abdomen, MRI brain, CT abdomen showing left adrenal nodule, infrarenal abdominal aortic aneurysm 5.8 cm.  MRI brain was negative  I have discussed case with radiation oncology with significant shortness of breath, potential for worsening as we pursue molecular testing and the fact this is not a non-small cell lung cancer.  We can consider palliative radiation treatment to start with this will help with his shortness of breath, pain associated with the tumor. This is scheduled for today.  In the meantime we will obtain relocality ration sequencing with [employer] PD-L1 analysis plan on starting chemotherapy after completion of radiation.    Now d/c chest tube, will need home oxygen, continue palliative radiation, awaiting next generation sequencing.  Patient is ok to be discharged from my standpoint. Will follow up next week.    Infrarenal abdominal aortic aneurysm  Vascular surgery outpatient consultation.

## 2022-09-21 NOTE — PROGRESS NOTES
Patient Name: Deric Wylie  : 1933  MRN #: [id number]          RADIATION ON TREATMENT VISIT NOTE    DIAGNOSIS:    Diagnosis Plan   1. Malignant neoplasm of upper lobe of right lung (HCC)          Cancer Staging  No matching staging information was found for the patient.     TREATMENT COURSE:   Oncology/Hematology History   Malignant neoplasm of upper lobe of right lung (HCC)   9/15/2022 Initial Diagnosis    Malignant neoplasm of upper lobe of right lung (HCC)         Treatment Site/Current Radiation Dose:  Radiation Treatments     Active   Plans   RtUpperLung   Most recent treatment: Dose planned: 400 cGy (fraction 3 on 2022)   Total: Dose planned: 2,000 cGy (5 fractions)   Elapsed Days: 2      Reference Points   RtUpperLobe   Most recent treatment: Dose given: 400 cGy (on 2022)   Total: Dose given: 1,200 cGy   Elapsed Days: 2                   Current Radiation Dose:   1200 cGy    Subjective:  Doing well, remains inpatient with occasional hemoptysis according to records    EXAM  KPS: 60    There were no vitals filed for this visit.  There were no vitals filed for this visit.    Weight:   Wt Readings from Last 3 Encounters:   22 64.3 kg (141 lb 12.8 oz)   20 72.5 kg (159 lb 13.3 oz)       NAD      LABS (Reviewed):    Hematology  WBC   Date Value Ref Range Status   2022 7.60 3.40 - 10.80 10*3/mm3 Final     RBC   Date Value Ref Range Status   2022 5.50 4.14 - 5.80 10*6/mm3 Final     Hemoglobin   Date Value Ref Range Status   2022 15.2 13.0 - 17.7 g/dL Final     Hematocrit   Date Value Ref Range Status   2022 47.5 37.5 - 51.0 % Final     Platelets   Date Value Ref Range Status   2022 180 140 - 450 10*3/mm3 Final       Chemistry   Lab Results   Component Value Date    GLUCOSE 160 (H) 2022    BUN 18 2022    CREATININE 0.89 2022    EGFRIFNONA 76 2021    BCR 20.2 2022    K 4.7 2022    CO2 31.0 (H) 2022    CALCIUM 11.8  (H) 09/20/2022    ALBUMIN 3.20 (L) 09/20/2022    AST 33 09/20/2022    ALT 34 09/20/2022         Medication:   No current facility-administered medications for this visit.  No current outpatient medications on file.    Facility-Administered Medications Ordered in Other Visits:   •  acetaminophen (TYLENOL) tablet 650 mg, 650 mg, Oral, Q4H PRN, 650 mg at 09/14/22 1612 **OR** acetaminophen (TYLENOL) 160 MG/5ML solution 650 mg, 650 mg, Oral, Q4H PRN **OR** acetaminophen (TYLENOL) suppository 650 mg, 650 mg, Rectal, Q4H PRN, Guillermina Garcia APRN  •  aluminum-magnesium hydroxide-simethicone (MAALOX MAX) 400-400-40 MG/5ML suspension 15 mL, 15 mL, Oral, Q6H PRN, Kiara Moreno MD, 15 mL at 09/19/22 2354  •  aspirin chewable tablet 81 mg, 81 mg, Oral, Daily, Bernardo Lorenzo MD, 81 mg at 09/21/22 0858  •  budesonide (PULMICORT) nebulizer solution 0.5 mg, 0.5 mg, Nebulization, BID - RT, Radha Holman APRN, 0.5 mg at 09/21/22 0658  •  ipratropium-albuterol (DUO-NEB) nebulizer solution 3 mL, 3 mL, Nebulization, Q4H PRN, Guillermina Garcia APRN  •  ipratropium-albuterol (DUO-NEB) nebulizer solution 3 mL, 3 mL, Nebulization, TID - RT, Judson Thomas MD, 3 mL at 09/21/22 1120  •  ondansetron (ZOFRAN) tablet 4 mg, 4 mg, Oral, Q6H PRN **OR** ondansetron (ZOFRAN) injection 4 mg, 4 mg, Intravenous, Q6H PRN, Guillermina Garcia APRN  •  sodium chloride 0.9 % flush 10 mL, 10 mL, Intravenous, Q12H, Guillermina Garcia APRN, 10 mL at 09/21/22 0858  •  sodium chloride 0.9 % flush 10 mL, 10 mL, Intravenous, PRN, Guillermina Garcia, ANN      PAIN:   Deric Wylie reports a pain score of 0.  Given his pain assessment as noted, treatment options were discussed and the following options were decided upon as a follow-up plan to address the patient's pain: continuation of current treatment plan for pain.        Patient chart including appropriate labs, setup images, treatment parameters, delivered dose have been reviewed      Recommendations:   [x]  Continue RT  [] Change RT Plan [] Hold RT, length:      RTC 1 month after EOT  Schedule outpatient f/u with medical oncology to discuss systemic options    Approved Electronically By:  Jadon Milner MD, 9/21/2022, 13:05 EDT

## 2022-09-21 NOTE — DISCHARGE PLACEMENT REQUEST
"Deric Wylie (89 y.o. Male)             Date of Birth   02/25/1933    Social Security Number       Address   [address] [place] [facility]    Home Phone   [phone]    MRN   [id number]       [employer]    Marital Status                               Admission Date   9/13/22    Admission Type   Emergency    Admitting Provider   Clifford Knight MD    Attending Provider   Judson Thomas MD    Department, Room/Bed   [facility] 3A MEDICAL INPATIENT, 343/1       Discharge Date       Discharge Disposition       Discharge Destination                               Attending Provider: Judson Thomas MD    Allergies: No Known Allergies    Isolation: None   Infection: None   Code Status: CPR   Advance Care Planning Activity    Ht: 180.3 cm (71\")   Wt: 64.3 kg (141 lb 12.8 oz)    Admission Cmt: None   Principal Problem: Shortness of breath [R06.02]                 Active Insurance as of 9/13/2022     Primary Coverage     Payor Plan Insurance Group Employer/Plan Group    MEDICARE MEDICARE A & B      Payor Plan Address Payor Plan Phone Number Payor Plan Fax Number Effective Dates    [address] [phone]  2/1/1998 - None Entered    [facility] [id number]       Subscriber Name Subscriber Birth Date Member ID       DERIC WYLIE 2/25/1933 [id number]           Secondary Coverage     Payor Plan Insurance Group Employer/Plan Group    ANTHEM BLUE CROSS ANTHEM BLUE CROSS BLUE SHIELD PPO [id number]     Payor Plan Address Payor Plan Phone Number Payor Plan Fax Number Effective Dates    [address] [phone]  1/1/2022 - None Entered    [facility] [id number]       Subscriber Name Subscriber Birth Date Member ID       DERIC WYLIE 2/25/1933 [id number]                 Emergency Contacts      (Rel.) Home Phone Work Phone Mobile Phone    Guillermina Reynolds (Neighbor) -- -- [phone]              "

## 2022-09-22 NOTE — PROGRESS NOTES
RADIATION THERAPY DISCHARGE INSTRUCTIONS    Deric Wylie completed 2000 cGy in 5 fractions for treatment of malignant neoplasm of upper lobe of right lung. The following instructions were provided to the patient and/or family in their printed after visit summary (AVS) as well as discussed in-person with the radiation oncology nurse. The patient and/or family member had the opportunity to ask questions and verbalized their questions were adequately answered. Patient is to reach out to our clinic if new symptoms arise, and continue close follow up with Dr. Delgado.     DIET  [x]  Eat a regular, well balanced diet that is high in protein such as meat, eggs, cheese, and nut butters  [x]  Drink 48 to 64 ounces of fluid daily  []  Drink lots of fluids to help decrease thick oral secretions  [x]  Use nutritional supplements if you are not able to eat full meals  [x]  Monitor your weight and report continued weight loss to your doctor    MEDICATIONS  [x]  Use Tylenol as needed to decrease discomfort and irritation to treatment area  []  Use Tylenol as needed to decrease breast discomfort and irritation due to swelling and skin reaction  [x]  Take pain medications only as prescribed  [x]  Take a laxative or stool softener as needed to prevent constipation due to pain medications  []  Use a synthetic saliva product (such as Salivart®, Glandosane®, or MouthKote®) as needed to alleviate dry mouth or throat  [x]  Use Leanne's Magic Mouthwash or other oral pain relief medication before meals and before taking medications as needed to ease the discomfort of swallowing  []  Use an over-the-counter decongestant (such as Sudafed®) if your ears feel plugged  []  Use Imodium (up to 8 pills per day) as needed for loose stools    SKIN CARE  [x]  Wash treated skin gently with your hands using a mild, non-drying soap such as Dove® or Aveeno® until skin returns to normal  [x]  Pat skin dry - do not rub  [x]  Keep treated skin moist with  frequent applications of Aquaphor® or unscented lotion (such as Eucerin)®  [x]  Always protect your treated skin when outdoors by wearing protective clothing and applying sunscreen SPF 15 or higher at least 30 minutes before going outdoors and reapply frequently  []  Resume use of deodorant to the armpit of the affected arm when skin reactions improve    ORAL CARE  []  Continue oral rinses as directed until mouth soreness goes away  []  Avoid using commercial mouthwash that contains alcohol  []  Rinse mouth with salt and soda solution: 2 teaspoons salt and 2 teaspoons baking soda dissolved in 8 ounces warm water  []  Perform oral care twice daily and after each meal using a soft or extra soft toothbrush  []  Continue regular visits to your dentist and follow guidelines to care for your teeth    ACTIVITY  [x]  Fatigue is a normal side effect of radiation therapy and should improve over time  [x]  Alternate rest and activity  [x]  Exercise such as walking may help to improve your fatigue    FOLLOW-UP  [x]  Continue follow-up appointments with all other doctors as necessary  []  Continue to have regular mammograms as ordered by your physician  []  Ensure you have a PSA level drawn at [facility] laboratory (no appointment necessary) at least two days prior to your one month follow-up appointment with Dr. Salinas  []  Call your radiation oncology doctor if you are concerned with any side effects you are experiencing: [phone]  [x]  Call your radiation oncology doctor if you are concerned with any side effects you are experiencing, such as increased shortness of breath, fevers or chills, night sweats, increased coughing or new cough, blood in your sputum, or difficulty swallowing: [phone]    SPECIAL INSTRUCTIONS  []  Perform self-breast exams monthly (see below)  []  Continue all range of motion and mobility exercise as instructed (if applicable)  []  Never allow blood draws or blood pressures to be  taken on your affected arm unless in an emergency situation (if applicable)  []  Practice careful nail care and avoid open skin to your affected arm and hand  []  Call your physician if you notice swelling of your affected arm or hand that is unusual or doesn't go away  []  Continue performing the on-treatment skin care routine recommended by your radiation oncology until your 1-month follow-up appointment  []  Do not stop taking your steroid medication suddenly; your medical or radiation oncologist will slowly decrease your dose to prevent any adverse effects.  []  Do not drive until your doctor has said it's okay to do so  []  Reintroduce fiber into your diet slowly so you will know which foods cause loose stools or cramps  []  Use sitz baths as directed  []  Side effects should subside in a couple weeks; tell your doctor if you have increased burning, frequency, or blood in your urine or stool  []  Notify your medical or radiation oncologist if you notice any white patches inside your mouth or on your tongue, with or without foul taste. This could be a yeast infection and prompt treatment is important.  _______________________________________________________________________    Completed by: Nila Diaz MA, Radiation Oncology Medical Assistant on 09/23/2022  at 09:35 EDT

## 2022-09-22 NOTE — PLAN OF CARE
Problem: Adult Inpatient Plan of Care  Goal: Plan of Care Review  Outcome: Ongoing, Progressing  Flowsheets (Taken 9/22/2022 0230)  Plan of Care Reviewed With: patient  Goal: Patient-Specific Goal (Individualized)  Outcome: Ongoing, Progressing  Goal: Absence of Hospital-Acquired Illness or Injury  Outcome: Ongoing, Progressing  Intervention: Identify and Manage Fall Risk  Recent Flowsheet Documentation  Taken 9/22/2022 0214 by Arnoldo Mcghee RN  Safety Promotion/Fall Prevention: safety round/check completed  Taken 9/22/2022 0026 by Arnoldo Mcghee RN  Safety Promotion/Fall Prevention: safety round/check completed  Taken 9/21/2022 2224 by Arnoldo Mcghee RN  Safety Promotion/Fall Prevention: safety round/check completed  Taken 9/21/2022 2034 by Arnoldo Mcghee RN  Safety Promotion/Fall Prevention:   safety round/check completed   room organization consistent   nonskid shoes/slippers when out of bed   clutter free environment maintained  Intervention: Prevent Skin Injury  Recent Flowsheet Documentation  Taken 9/21/2022 2034 by Arnoldo Mcghee RN  Skin Protection:   adhesive use limited   protective footwear used   transparent dressing maintained  Intervention: Prevent and Manage VTE (Venous Thromboembolism) Risk  Recent Flowsheet Documentation  Taken 9/21/2022 2034 by Arnoldo Mcghee RN  VTE Prevention/Management:   bilateral   sequential compression devices on  Intervention: Prevent Infection  Recent Flowsheet Documentation  Taken 9/21/2022 2034 by Arnoldo Mcghee RN  Infection Prevention:   rest/sleep promoted   single patient room provided   personal protective equipment utilized   hand hygiene promoted  Goal: Optimal Comfort and Wellbeing  Outcome: Ongoing, Progressing  Intervention: Provide Person-Centered Care  Recent Flowsheet Documentation  Taken 9/21/2022 2034 by Arnoldo Mcghee RN  Trust Relationship/Rapport:   care explained   emotional support provided   choices provided   questions answered    empathic listening provided   questions encouraged   reassurance provided   thoughts/feelings acknowledged  Goal: Readiness for Transition of Care  Outcome: Ongoing, Progressing     Problem: COPD (Chronic Obstructive Pulmonary Disease) Comorbidity  Goal: Maintenance of COPD Symptom Control  Outcome: Ongoing, Progressing  Intervention: Maintain COPD-Symptom Control  Recent Flowsheet Documentation  Taken 9/22/2022 0214 by Arnoldo Mcghee RN  Medication Review/Management: medications reviewed  Taken 9/22/2022 0026 by Arnoldo Mcghee RN  Medication Review/Management: medications reviewed  Taken 9/21/2022 2224 by Arnoldo Mcghee RN  Medication Review/Management: medications reviewed  Taken 9/21/2022 2034 by Arnoldo Mcghee RN  Supportive Measures: active listening utilized  Medication Review/Management: medications reviewed     Problem: Skin Injury Risk Increased  Goal: Skin Health and Integrity  Outcome: Ongoing, Progressing  Intervention: Optimize Skin Protection  Recent Flowsheet Documentation  Taken 9/21/2022 2034 by Arnoldo Mcghee RN  Pressure Reduction Techniques:   frequent weight shift encouraged   weight shift assistance provided  Pressure Reduction Devices: pressure-redistributing mattress utilized  Skin Protection:   adhesive use limited   protective footwear used   transparent dressing maintained   Goal Outcome Evaluation:  Plan of Care Reviewed With: patient        Progress: no change  Outcome Evaluation: Patient rested throughout the night, no complaints at this time.

## 2022-09-22 NOTE — CASE MANAGEMENT/SOCIAL WORK
Case Management Discharge Note      Final Note: Home        Selected Continued Care - Discharged on 9/22/2022 Admission date: 9/13/2022 - Discharge disposition: Home or Self Care            Transportation Services  Private: Car    Final Discharge Disposition Code: 01 - home or self-care

## 2022-09-22 NOTE — PROGRESS NOTES
"PULMONARY CRITICAL CARE Progress  NOTE      PATIENT IDENTIFICATION:  Name: Deric Wylie  MRN: [id number]  :  1933     Age: 89 y.o.  Sex: male    DATE OF Note:  2022   Referring Physician: Clifford Knight MD                  Subjective:   Feeling better, sitting up in bed, no SOB, no chest or abdominal pain, no bowel or bladder issues reported    Objective:  tMax 24 hrs: Temp (24hrs), Av.9 °F (36.6 °C), Min:97.4 °F (36.3 °C), Max:98.2 °F (36.8 °C)      Vitals Ranges:   Temp:  [97.4 °F (36.3 °C)-98.2 °F (36.8 °C)] 97.9 °F (36.6 °C)  Heart Rate:  [] 97  Resp:  [17-22] 17  BP: (122-132)/(69-76) 132/76    Intake and Output Last 3 Shifts:   I/O last 3 completed shifts:  In: 1440 [P.O.:1440]  Out:  [Urine:]    Exam:  /76 (BP Location: Right arm, Patient Position: Lying)   Pulse 97   Temp 97.9 °F (36.6 °C) (Oral)   Resp 17   Ht 180.3 cm (71\")   Wt 64.3 kg (141 lb 12.8 oz)   SpO2 93%   BMI 19.78 kg/m²     General Appearance: Alert  HEENT:  Normocephalic, without obvious abnormality, Conjunctivae/corneas clear.  Normal external ear canals, nares normal, no drainage     Neck:  Supple, symmetrical, trachea midline. No JVD.  Lungs /Chest wall:   Bilateral basal rhonchi, respirations unlabored, symmetrical wall movement.     Heart:  Regular rate and rhythm, systolic murmur PMI left sternal border  Abdomen: Soft, nontender, no masses, no organomegaly.    Extremities: Trace edema, no clubbing or cyanosis        Medications:    Current Facility-Administered Medications:     acetaminophen (TYLENOL) tablet 650 mg, 650 mg, Oral, Q4H PRN, 650 mg at 22 1612 **OR** acetaminophen (TYLENOL) 160 MG/5ML solution 650 mg, 650 mg, Oral, Q4H PRN **OR** acetaminophen (TYLENOL) suppository 650 mg, 650 mg, Rectal, Q4H PRN, Guillermina Garcia APRN    aluminum-magnesium hydroxide-simethicone (MAALOX MAX) 400-400-40 MG/5ML suspension 15 mL, 15 mL, Oral, Q6H PRN, Kiara Moreno MD, 15 mL at " 09/19/22 2354    aspirin chewable tablet 81 mg, 81 mg, Oral, Daily, Bj, Bernardo Prieto MD, 81 mg at 09/21/22 0858    budesonide (PULMICORT) nebulizer solution 0.5 mg, 0.5 mg, Nebulization, BID - RT, Radha Holman, ANN, 0.5 mg at 09/21/22 1845    ipratropium-albuterol (DUO-NEB) nebulizer solution 3 mL, 3 mL, Nebulization, Q4H PRN, Guillermina Garcia APRN    ipratropium-albuterol (DUO-NEB) nebulizer solution 3 mL, 3 mL, Nebulization, TID - RT, Judson Thomas MD, 3 mL at 09/21/22 1845    ondansetron (ZOFRAN) tablet 4 mg, 4 mg, Oral, Q6H PRN **OR** ondansetron (ZOFRAN) injection 4 mg, 4 mg, Intravenous, Q6H PRN, Guillermina Garcia APRN    sodium chloride 0.9 % flush 10 mL, 10 mL, Intravenous, Q12H, Guillermina Garcia APRN, 10 mL at 09/21/22 2034    sodium chloride 0.9 % flush 10 mL, 10 mL, Intravenous, PRN, Guillermina Garcia, ANN    Data Review:  All labs (24hrs):   Recent Results (from the past 24 hour(s))   Rad Onc [facility] Session Summary    Collection Time: 09/21/22 12:38 PM   Result Value Ref Range    Course ID C1     Course Start Date 9/15/2022  4:23 PM     Course First Treatment Date 9/19/2022  4:08 PM     Course Last Treatment Date 9/21/2022 12:37 PM     Course Elapsed Days 2     Reference Point ID RtUpperLobe     Reference Point Dosage Given to Date 12 Gy    Reference Point Session Dosage Given 4 Gy    Plan ID RtUpperLung     Plan Fractions Treated to Date 3     Plan Total Fractions Prescribed 5     Plan Prescribed Dose Per Fraction 4 Gy    Plan Total Prescribed Dose 2,000 cGy    Plan Primary Reference Point RtUpperLobe         Imaging:  XR Chest 1 View  Narrative:    DATE OF EXAM:   9/21/2022 5:10 AM     PROCEDURE:   XR CHEST 1 VW-     INDICATIONS:   Shortness of breath; R06.02-Shortness of breath; R09.02-Hypoxemia;  R91.8-Other nonspecific abnormal finding of lung field     COMPARISON:  09/20/2022     TECHNIQUE:   Portable chest radiograph.     FINDINGS:   Stable elevation of the right hemidiaphragm. Redemonstration of  a right  upper lobe paratracheal mass. Advanced emphysema. Bibasilar  atelectasis/scarring similar to the prior study. There is a chronic  linear scarring at the left upper lobe. Dense aortic arch at there is  chronic calcifications. Small right pleural effusion.     Impression: 1. Redemonstration of right upper lobe paratracheal mass.  2. Persistent bibasilar airspace disease which may relate to atelectasis  with elevated right hemidiaphragm.  3. Advanced emphysema.  4. Small right pleural effusion.     Electronically Signed By-Jayant Garcia MD On:9/21/2022 10:17 AM  This report was finalized on [id number] by  Jayant Garcia MD.       ASSESSMENT:  Right upper lobe squamous cell carcinoma  Right upper lobe paratracheal mass  Large pleural effusion  COPD       PLAN:  May discharge and follow-up with our service in 2 weeks  Bronchodilator  Inhaled corticosteroids  Incentive spirometer  Electrolytes/ glycemic control  No DVT and GI prophylaxis    Discussed with Dr. Beverley Holman, APRN   9/22/2022  07:39 EDT     I personally have examined  and interviewed the patient. I have reviewed the history, data, problems, assessment and plan with our NP.  Total Critical care time in direct medical management (   ) minutes, This time specifically excludes time spent performing procedures.    Franklin Lowery MD   9/23/2022  23:19 EDT

## 2022-09-22 NOTE — PATIENT INSTRUCTIONS
RADIATION THERAPY DISCHARGE INSTRUCTIONS  Chest    CONGRATULATIONS! You completed 5 radiation treatments for treatment of your malignant neoplasm of upper lobe of right lung cancer. Please make sure to review these instructions and call the Radiation Oncology Department if you have any questions or concerns with symptoms you may experience. Thank you for trusting us with your cancer treatment!    DIET  Eat a regular, well balanced diet that is high in protein such as meat, eggs, cheese, and nut butters  Drink 48 to 64 ounces of fluid daily  Use nutritional supplements if you are not able to eat full meals  Monitor your weight and report continued weight loss to your doctor    MEDICATIONS  Use Tylenol as needed to decrease discomfort and irritation to treatment area  Take pain medications only as prescribed  Take a laxative or stool softener as needed to prevent constipation due to pain medications  Use Leanne's Magic Mouthwash or other oral pain relief medication before meals and before taking medications as needed to ease the discomfort of swallowing (if applicable)    SKIN CARE  Wash treated skin gently with your hands using a mild, non-drying soap such as Dove® or Aveeno® until skin returns to normal  Pat skin dry - do not rub  Keep treated skin moist with frequent applications of Aquaphor® or unscented lotion (such as Eucerin)®  Always protect your treated skin when outdoors by wearing protective clothing and applying sunscreen SPF 15 or higher at least 30 minutes before going outdoors and reapply frequently  Resume use of deodorant to the armpit of the affected arm when skin reactions improve    ACTIVITY  Fatigue is a normal side effect of radiation therapy and should improve over time  Alternate rest and activity  Exercise such as walking may help to improve your fatigue    FOLLOW-UP  Continue follow-up appointments with all other doctors as necessary  Call your radiation oncology doctor if you are concerned  with any side effects you are experiencing, such as increased shortness of breath, fevers or chills, night sweats, increased coughing or new cough, blood in your sputum, or difficulty swallowing: [phone]    _______________________________________________________________________    Completed by: Nila Diaz MA on 9/23/2022 at 09:36 EDT

## 2022-09-23 NOTE — OUTREACH NOTE
Prep Survey    Flowsheet Row Responses   [facility] patient discharged from? [facility]   Is LACE score < 7 ? No   Emergency Room discharge w/ pulse ox? No   Eligibility Readm Mgmt   Discharge diagnosis non-small cell lung cancer.   Does the patient have one of the following disease processes/diagnoses(primary or secondary)? Other   Does the patient have Home health ordered? No   Is there a DME ordered? Yes   What DME was ordered? [employer] for oxygen    Prep survey completed? Yes          TANYA BOTELLO - Registered Nurse

## 2022-09-23 NOTE — TELEPHONE ENCOUNTER
TRIED TO REACH PATIENT TO SCHEDULE HOSPITAL FOLLOW UP FOR NEXT WEEK AND GOT RECORDING STATING NOT AVAILABLE TO TAKE CALLS AT THIS TIME AND THEN CALL DISCONNECTED.

## 2022-09-23 NOTE — PROGRESS NOTES
RADIATION ONCOLOGY  TREATMENT COMPLETION NOTE  NAME: Deric Wylie  YOB: 1933  MRN #: [id number]  DATE OF SERVICE: 9/23/2022  REFERRING PROVIDER:   Bernardo Singh MD  [address] [place] [id number]  PRIMARY CARE PROVIDER: Bernardo Singh MD   Primary Radiation Oncologist: Jadon Milner MD    DIAGNOSIS:    Diagnosis Plan   1. Malignant neoplasm of upper lobe of right lung (HCC)         WORKUP AND TX COURSE:  Oncology/Hematology History   Malignant neoplasm of upper lobe of right lung (HCC)   9/15/2022 Initial Diagnosis    Malignant neoplasm of upper lobe of right lung (HCC)         Radiation Treatments     Active   Reference Points   RtUpperLobe   Most recent treatment: Dose given: 400 cGy (on 9/23/2022)   Total: Dose given: 2,000 cGy   Elapsed Days: 4                   TREATMENT DETAILS    SITE:    Right upper lung  RX:    400 cGy x5 = 2000 cGy  ENERGY:   18 MV photons  BEAMS:   AP, PA  TX DATES:   9/19/2022 - 9/23/2022  ELAPSED DAYS:  4      Treatment Tolerance:   Overall Mr. Wylie tolerated his treatments well  His breathing and pulmonary status continued to improve during the course of his therapy  He remained inpatient for most of the treatments and return as an outpatient for his final fraction        Current Outpatient Medications:   •  aspirin 81 MG chewable tablet, Chew 81 mg Daily., Disp: , Rfl:       Disposition:  Return to clinic in 1 month  Follow-up with medical oncology in the interim to discuss systemic treatment options      Approved Electronically By:  Jadon Milner MD, 9/23/2022, 14:40 EDT

## 2022-09-26 NOTE — OUTREACH NOTE
Medical Week 1 Survey    Flowsheet Row Responses   [facility] facility patient discharged from? [facility]   Does the patient have one of the following disease processes/diagnoses(primary or secondary)? Other   Week 1 attempt successful? No   Unsuccessful attempts Attempt 1          PADMAJA BOTELLO - Licensed Nurse

## 2022-09-29 NOTE — OUTREACH NOTE
Medical Week 1 Survey    Flowsheet Row Responses   [facility] patient discharged from? [facility]   Does the patient have one of the following disease processes/diagnoses(primary or secondary)? Other   Week 1 attempt successful? Yes   Call start time 1001   Call end time 1016   Meds reviewed with patient/caregiver? Yes   Is the patient having any side effects they believe may be caused by any medication additions or changes? No   Does the patient have all medications ordered at discharge? N/A   Is the patient taking all medications as directed (includes completed medication regime)? Yes   Does the patient have a primary care provider?  Yes   Does the patient have an appointment with their PCP within 7 days of discharge? Yes   Has the patient kept scheduled appointments due by today? N/A   Has home health visited the patient within 72 hours of discharge? N/A   Home health comments Declined HH services.   Has all DME been delivered? Yes   DME comments States left his walker at hospital-will forward to case management.   Psychosocial issues? Yes   Psychosocial comments Lives alone. Declined SNF placement, and declined HH services.   Did the patient receive a copy of their discharge instructions? Yes   Nursing interventions Reviewed instructions with patient   What is the patient's perception of their health status since discharge? Same   Is the patient/caregiver able to teach back signs and symptoms related to disease process for when to call PCP? Yes   Is the patient/caregiver able to teach back signs and symptoms related to disease process for when to call 911? Yes   Is the patient/caregiver able to teach back the hierarchy of who to call/visit for symptoms/problems? PCP, Specialist, Home health nurse, Urgent Care, ED, 911 Yes   If the patient is a current smoker, are they able to teach back resources for cessation? Not a smoker   Week 1 call completed? Yes   Wrap up additional comments Patient states is about the  same. States is using home oxygen continuously. States only need today is his walker which he left behind at the hospital. Call to [facility] at [facility]-no walker found. Will forward to case management.          MACHO BURROUGHS - Registered Nurse

## 2022-10-07 NOTE — OUTREACH NOTE
Medical Week 2 Survey    Flowsheet Row Responses   [facility] facility patient discharged from? [facility]   Does the patient have one of the following disease processes/diagnoses(primary or secondary)? Other   Week 2 attempt successful? No   Unsuccessful attempts Attempt 1          PADMAJA BOTELLO - Licensed Nurse

## 2022-10-10 NOTE — PROGRESS NOTES
Education for Administration of Chemotherapy and/or Biotherapy     NAME: Deric Wylie  : 1933  MRN: [id number]  DATE OF SERVICE: 10/12/2022  REASON FOR VISIT: PATIENT EDUCATION    Mr. Deric Wylie is here today for education on his upcoming chemotherapy and/or biotherapy recommended for treatment of his disease.     I reviewed treatment options, obtained signed consent, and answered any questions he had regarding the administration of Keytruda.     Deric Wylie has already consulted with Dr Delgado for the treatment of malignant neoplasm right upper lung.  The patient's oncologist has discussed the same treatment options with the patient and answered his questions prior to today's visit.    TREATMENT GOALS:  The goal of the treatment is to:    [] Curative intent - intent is cure; cure implies patient survival will not be restricted by current cancer diagnosis   [x] Control  - intent is to extend survival but not long enough to meet definition of cure for patient with that diagnosis   [] Palliative - means treatment given in a non-curative setting to optimize symptom control, improve quality of life, and improve survival    This treatment has been explained to Deric Wylie. Alternative methods of treatment, if any, have been explained to Deric Wylie, as have the benefits and risks of each. Based on the physician's explanation of the benefits and risks of this treatment and any alternatives available, the patient agrees the potential benefits outweighs the potential risks involved. I have explained to the patient the most likely complications which might occur from this treatment. The patient understands along with the treatment additional medications may be necessary to lessen the side effects.     SIDE EFFECTS:  Possible side effects may include but are not limited to, any of the following, or a combination of the following:    [x]  Abdominal pain  []  Hypersensitivity reaction [x]  Rash   [x]   Allergic Reaction []  Hypertension []  Secondary malignancies   [x]  Anemia [x]  Hypertriglyceridemia  []  Sexual side effects    []  Anxiety [x]  Hypoalbuminemia [x]  Shortness of breath   [x]  Back pain [x]  Hypo /Hyperglycemia []  Skin changes   [x]  Body pain [x]  Hyponatremia  []  Somnolence   []  Blood clots (DVT/PE) [x]  Immune-mediated reaction [x]  Sore throat   [x]  Bleeding [x]  Infection  [x]  Swelling   []  Bone pain []  Infusion reaction  []  Taste changes   []  Bruising []  Injection site reaction  []  Temperature sensitivity   []  Cardiovascular events  []  Injection site ulceration []  Thrombocytopenia   []  Central neurotoxicity [x]  Insomnia []  Thyroid changes   []  Chest pain []  Itching []  Tinnitus   [x]  Chills []  Joint pain [x]  Upper respiratory tract infection    []  Confusion []  Kidney damage []  Visual changes   []  Congestive heart failure []  Leukopenia [x]  Vitlligo   [x]  Constipation [x]  LFT imbalances [x]  Vomiting   []  Cough []  Liver damage []  Watery eyes   []  Depression [x]  Loss of appetite []  Weakness   [x]  Diarrhea []  Low blood pressure []  Weight gain   [x]  Dizziness []  Lung damage []  Weight loss   []  Dry skin []  Menopausal symptoms []  Wound healing complication   []  Ecchymosis []  Menstrual irregularities [x]  Colitis   [x]  Electrolyte imbalances []  Metallic taste  [x]  Pneumonitis   []  Elevated LDH []  Mood changes [x]  Immune Mediated Reactions   []  Eye irritation []  Mouth sores []  Other   [x]  Fatigue [x]  Muscle aches  []  Other   []  Fertility effects  []  Nephrotic syndrome []  Other   [x]  Fevers []  Nail changes []  Other   []  Fistula formation []  Nausea  []  Other   []  Flu-like symptoms []  Neck pain  []  Other   []  Fluid retention []  Neutropenia []  Other   []  Forgetfulness []  Nosebleeds []  Other   []  Gastrointestinal perforation [x]  Pain in arms/legs []  Other   []  Hand foot syndrome []  Pericardial effusion  []  Other   []  Hair  loss/discoloration [x]  Peripheral neuropathy []  Other   [x]  Headaches []  Petechiae []  Other   []  Hearing loss/change []  Pharyngitis  []  Other   []  Heart damage []  Photosensitivity  []  Other   []  Hematuria []  Pleural effusion  []  Other   []  Hemorrhage []  Proteinuria  []  Other     VASCULAR ACCESS:  The patient was educated on the possible need for vascular access/port placement.  The patient was advised although uncommon, leakage of an infused medication from the vein or venous access device (port) may lead to skin breakdown and/or other tissue damage.  The patient was advised he may have pain, bleeding, and/or bruising from the insertion of a needle in their vein or venous access device (port).  The patient was further advised despite proper technique, infection with redness and irritation may rarely occur at the site where the needle was inserted.  The patient was advised if complications occur, additional medical treatment is available.    BLOOD COUNT MONITORING:  While receiving treatment, it has been explained to the patient blood counts will be monitored.  This may include but is not limited to a complete blood count (CBC). The patient may develop neutropenia, anemia, or thrombocytopenia. This has been explained and a handout was provided to the patient.     NUTRITION:   It was explained to the patient about nutrition and its importance while undergoing chemotherapy and/or biotherapy. Certain medications will be prescribed during the treatment which may change the way foods taste or smell. These changes may cause poor or no appetite. The patient was advised food is fuel for the body, and if it does not get the fuel it needs, he may become malnourished, which can lead to severe fatigue. It was discussed with the patient about calories and how to add high-calorie foods to his diet.  Protein was also mentioned in regards to how it will help make new cells for the body. Information was given to  Deric Wylie regarding good protein sources.   It was also discussed with the patient the importance of  eating and drinking every 2-3 hours while awake. We discussed fluid intake of at least 6 to 8 ounce glasses of liquids per day to stay hydrated. Examples are listed below:   Water  Juice (fruit or vegetable)  Soda Sport Drinks Soup   Milk  Ensure, Boost, Glucerna Ice Cream Popsicles Jello   Milkshakes Pudding  Gatorade Sherbert Yogurt     REPRODUCTION:  Reproductive risks were discussed, including appropriate use of birth control, and protection during sexual relations. The risks of becoming pregnant while receiving chemotherapy and/or biotherapy were reviewed for females.  Males were instructed to use appropriate birth control to prevent conception during treatment.  We also discussed the importance of using reliable barrier methods while participating in intimate activities as this may expose their partners to a potentially harmful drug. The importance of pregnancy prevention was emphasized due to risks of increased chance of birth defects and miscarriages.     Deric Wylie was provided handouts on:   1. Home instructions: Take temp BID and report 100.4 or higher  2. Complete blood counts and terminology: Discussed function of WBC, Hgb, Platelets in the body; signs when each are low; what signs to report  3. Nutrition during cancer therapy: Maintain balanced diet with good proteins; use boost or nutritional recipes provided if not eating well   4. Fluids and dehydration: Drink 8-10, 8-ounce glasses of caffeine free fluids daily; discussed signs of dehydration - report for IV fluids  5. Mouth care: Discussed good daily bathing, good mouth care, good hand hygiene as infection preventatives  6. Cancer-related fatigue: Stay active with rest periods  7. Management of constipation: Discussed constipation protocol, handouts provided    8. Management of diarrhea: Discussed diarrhea protocol, handouts provided  "  9. Handouts from [employer] on Keytruda printed, discussed, and sent with patient for reference   10. Handouts from [employer] on \"Chemotherapy Safety\" and \"Watching for and Preventing Infections\" printed, discussed and sent with patient for reference  11. A copy of pink refrigerator sheet with side effect management and numbers for reaching the clinic sent with patient  12. Discussed how to contact the clinic during business hours for problems, side effects and after hours, weekends and holidays reaching the provider on call  13. Discussed signs and symptoms of infection in detail  14. Discussed immune mediated responses in detail  15. Discussed how immunotherapy works in the body versus how chemotherapy works in the body    TOPICS EDUCATION PROVIDED EDUCATION REINFORCED COMMENTS   ANEMIA:  role of RBC, cause, s/s, ways to manage, role of transfusion [x] [x]    THROMBOCYTOPENIA:  role of platelet, cause, s/s, ways to prevent bleeding, things to avoid, when to seek help [x] [x]    NEUTROPENIA:  role of WBC, cause, infection precautions, s/s of infection, when to call MD [x] [x]    NUTRITION & APPETITE CHANGES:  importance of maintaining healthy diet & weight, ways to manage to improve intake, dietary consult, exercise regimen [x] [x]    DIARRHEA:  causes, s/s of dehydration, ways to manage, dietary changes, when to call MD [x] [x]    CONSTIPATION:  causes, ways to manage, dietary changes, when to call MD [x] [x]    NAUSEA & VOMITING:  causes, use of antiemetics, dietary changes, when to call MD [x] [x]    MOUTH SORES:  causes, oral care, ways to manage [x] [x]    ALOPECIA:  causes, ways to manage, resources [x] [x]    INFERTILITY & SEXUALITY:  causes, fertility preservation options, sexuality changes, ways to manage, importance of birth control [x] [x]    NERVOUS SYSTEM CHANGES:  causes, s/s, neuropathies, cognitive changes, ways to manage [x] [x]    PAIN:  causes, ways to manage [x] [x]    SKIN & " NAIL CHANGES:  cause, s/s, ways to manage [x] [x]    ORGAN TOXICITIES:  cause, s/s, need for diagnostic tests, labs, when to notify MD [x] [x]    SURVIVORSHIP:  distress, distress assessment, secondary malignancies, early/late effects, follow-up, social issues, social support [] []    HOME CARE:  use of spill kits, storing of PO chemo, how to manage bodily fluids [x] [x]    MISCELLANEOUS:  drug interactions, administration, vesicants  [x] [x]      PAST MEDICAL HISTORY:  Past Medical History:   Diagnosis Date   • COPD (chronic obstructive pulmonary disease) (HCC)        PAST SURGICAL HISTORY:  Past Surgical History:   Procedure Laterality Date   • ABDOMINAL AORTIC ANEURYSM REPAIR     • APPENDECTOMY         CURRENT MEDICATIONS:    Current Outpatient Medications:   •  aspirin 81 MG chewable tablet, Chew 81 mg Daily., Disp: , Rfl:   •  traMADol (Ultram) 50 MG tablet, Take 0.5 tablets by mouth Every 6 (Six) Hours As Needed for Moderate Pain for up to 30 days., Disp: 60 tablet, Rfl: 0    ALLERGIES:  No Known Allergies    FAMILY HISTORY:  Family History   Problem Relation Age of Onset   • Heart disease Father    • Heart disease Mother    • Cancer Brother        ONCOLOGIC FAMILY HISTORY:  Cancer-related family history includes Cancer in his brother.    SOCIAL HISTORY:  Social History     Tobacco Use   • Smoking status: Former   • Smokeless tobacco: Never   Substance Use Topics   • Alcohol use: Never   • Drug use: Never       HPI, ROS and PFSH have been reviewed and confirmed on 10/12/2022.     REVIEW OF SYSTEMS:  Review of Systems   Constitutional: Negative for chills, fatigue and fever.   HENT: Negative.    Eyes: Negative for visual disturbance.   Respiratory: Negative for shortness of breath.    Cardiovascular: Negative for chest pain and palpitations.   Gastrointestinal: Negative for abdominal pain.   Endocrine: Negative.    Genitourinary: Negative for dysuria.   Musculoskeletal: Negative.    Skin: Negative for rash and  wound.   Neurological: Negative for dizziness.   Psychiatric/Behavioral: Negative for behavioral problems.       PHYSICAL EXAMINATION:  Physical Exam  Vitals and nursing note reviewed.   HENT:      Head: Normocephalic.      Mouth/Throat:      Pharynx: Oropharynx is clear.   Eyes:      Pupils: Pupils are equal, round, and reactive to light.   Cardiovascular:      Rate and Rhythm: Normal rate and regular rhythm.      Pulses: Normal pulses.      Heart sounds: Normal heart sounds.   Pulmonary:      Effort: Pulmonary effort is normal.      Breath sounds: Normal breath sounds.   Abdominal:      General: Bowel sounds are normal.   Musculoskeletal:         General: Normal range of motion.      Cervical back: Normal range of motion.   Skin:     General: Skin is warm.   Neurological:      Mental Status: He is alert and oriented to person, place, and time.   Psychiatric:         Behavior: Behavior normal.         LABS:  WBC   Date Value Ref Range Status   10/03/2022 4.96 3.40 - 10.80 10*3/mm3 Final     RBC   Date Value Ref Range Status   10/03/2022 4.97 4.14 - 5.80 10*6/mm3 Final     Hemoglobin   Date Value Ref Range Status   10/03/2022 14.0 13.0 - 17.7 g/dL Final     Hematocrit   Date Value Ref Range Status   10/03/2022 44.0 37.5 - 51.0 % Final     MCV   Date Value Ref Range Status   10/03/2022 88.5 79.0 - 97.0 fL Final     MCH   Date Value Ref Range Status   10/03/2022 28.2 26.6 - 33.0 pg Final     MCHC   Date Value Ref Range Status   10/03/2022 31.8 31.5 - 35.7 g/dL Final     RDW   Date Value Ref Range Status   10/03/2022 14.9 12.3 - 15.4 % Final     RDW-SD   Date Value Ref Range Status   10/03/2022 47.8 37.0 - 54.0 fl Final     MPV   Date Value Ref Range Status   10/03/2022 9.5 6.0 - 12.0 fL Final     Platelets   Date Value Ref Range Status   10/03/2022 192 140 - 450 10*3/mm3 Final     Neutrophil %   Date Value Ref Range Status   10/03/2022 73.4 42.7 - 76.0 % Final     Lymphocyte %   Date Value Ref Range Status    10/03/2022 10.9 (L) 19.6 - 45.3 % Final     Monocyte %   Date Value Ref Range Status   10/03/2022 12.9 (H) 5.0 - 12.0 % Final     Eosinophil %   Date Value Ref Range Status   10/03/2022 2.4 0.3 - 6.2 % Final     Basophil %   Date Value Ref Range Status   10/03/2022 0.4 0.0 - 1.5 % Final     Immature Grans %   Date Value Ref Range Status   09/29/2021 0.4 0.0 - 0.5 % Final     Neutrophils, Absolute   Date Value Ref Range Status   10/03/2022 3.64 1.70 - 7.00 10*3/mm3 Final     Lymphocytes, Absolute   Date Value Ref Range Status   10/03/2022 0.54 (L) 0.70 - 3.10 10*3/mm3 Final     Monocytes, Absolute   Date Value Ref Range Status   10/03/2022 0.64 0.10 - 0.90 10*3/mm3 Final     Eosinophils, Absolute   Date Value Ref Range Status   10/03/2022 0.12 0.00 - 0.40 10*3/mm3 Final     Basophils, Absolute   Date Value Ref Range Status   10/03/2022 0.02 0.00 - 0.20 10*3/mm3 Final     Immature Grans, Absolute   Date Value Ref Range Status   09/29/2021 0.02 0.00 - 0.05 10*3/mm3 Final     nRBC   Date Value Ref Range Status   09/20/2022 0.0 0.0 - 0.2 /100 WBC Final     Lab Results   Component Value Date    GLUCOSE 160 (H) 09/20/2022    BUN 18 09/20/2022    CREATININE 0.89 09/20/2022    EGFRIFNONA 76 09/29/2021    BCR 20.2 09/20/2022    K 4.7 09/20/2022    CO2 31.0 (H) 09/20/2022    CALCIUM 11.8 (H) 09/20/2022    ALBUMIN 3.20 (L) 09/20/2022    AST 33 09/20/2022    ALT 34 09/20/2022       Assessment & Plan   There are no diagnoses linked to this encounter.  ASSESSMENT   1. Encounter for medication management and education of Keytruda    2. Malignant neoplasm right upper lobe    PLAN  • Start Keytruda 10/12/22  • Notified , and dietician patient starting new treatment   · RTC Dr Delgado 10/24/22      I have reviewed labs results, imaging, vitals, and medications with the patient today.    A total of 40 minutes were spent in education of Keytruda and immunotherapy, side effects of Keytruda including -more common -less common  and -immune mediated responses, preparation of teaching packet, answering questions, addressing concerns, discussion of signs of infection to report, discussion to report diarrhea-rash-persistent cough with worsening SOB- extreme fatigue, discussion of all the information above, ordering, and documentation.    Electronically signed by ANN Wilkerson, 10/12/22, 10:14 AM EDT.

## 2022-10-11 NOTE — OUTREACH NOTE
Medical Week 2 Survey    Flowsheet Row Responses   [facility] facility patient discharged from? [facility]   Does the patient have one of the following disease processes/diagnoses(primary or secondary)? Other   Week 2 attempt successful? No   Unsuccessful attempts Attempt 2  [UTR both contact numbers]          SENAIT KUMAR - Registered Nurse  
Consent: The patient's consent was obtained including but not limited to risks of crusting, scabbing, blistering, scarring, darker or lighter pigmentary change, recurrence, incomplete removal and infection.
Post-Care Instructions: I reviewed with the patient in detail post-care instructions. Patient is to wear sunprotection, and avoid picking at any of the treated lesions. Pt may apply Vaseline to crusted or scabbing areas.
Duration Of Freeze Thaw-Cycle (Seconds): 10
Show Applicator Variable?: Yes
Detail Level: Detailed
Number Of Freeze-Thaw Cycles: 2 freeze-thaw cycles
Render Post-Care Instructions In Note?: no

## 2022-10-12 NOTE — PROGRESS NOTES
Pt. Was here at clinic for C1D1 Keytruda,   Pt. Was seen by Rohini JUAREZ for chemo teaching prior to his treatment.   Pt. Has no complaints today and cbc results within parameters for treatment.   Treatment given as ordered and pt. Tolerated well.   Pt. Discharged from clinic with no complaints and AVS was given.

## 2022-10-12 NOTE — PROGRESS NOTES
OSW met w/ patient in Exam room after infusion teaching by APRN.     OSW followed up with patient about POA papers that were drafted when he was in the hospital. Patient reports having them at home, but forgot to bring with him. Reports his POA is Guillermina Reynolds - 'a friend.' Per hospital records, patient is  w/ 2 children, whom he has no contact with. OSW explained needing POA papers on file, in order to make sure we have correct person making decisions - patient v/u.     OSW was asked by APRN about HH for patient. OSW spoke to patient about HH services or in-home palliative services. Patient stated he'd 'rather not right now.' OSW educated client on health literacy and support they could provide, if he changes his mind. Patient reports having '2 guys' that rotate helping to care for him and run errands. He denies any issues meeting basic needs at this time.     OSW discussed various services available here including: OSW, financial counselor, dietician, massage therapy and volunteer chaplaincy program. Also provided a packet of resources including a welcome letter, community, transportation and oncology resources.     OSW took patient back to infusion room for his treatment and got his 'helper' Sebastián and brought him back to the room, also.     OSW will remain available.     CHRISTOPHER LathamW, CSW, MSW  Oncology MSW  [facility]

## 2022-10-20 NOTE — OUTREACH NOTE
Medical Week 3 Survey    Flowsheet Row Responses   [facility] facility patient discharged from? [facility]   Does the patient have one of the following disease processes/diagnoses(primary or secondary)? Other   Week 3 attempt successful? No   Unsuccessful attempts Attempt 1          PORFIRIO Teran Registered Nurse

## 2022-10-21 NOTE — PROGRESS NOTES
HEMATOLOGY ONCOLOGY OUTPATIENT FOLLOW UP       Patient name: Deric Wylie  : 1933  MRN: [id number]  Primary Care Physician: Bernardo Singh MD  Referring Physician: Bernardo Singh,*  Reason For Consult: Non-small cell lung cancer        History of Present Illness:    Patient is a 89 y.o.  male who presented to the hospital with shortness of breath CT findings concerning for right upper lobe mass encasing SVC, pulmonary artery.  Patient had a CT-guided biopsy on 2022 consistent with invasive moderately differentiated squamous cell carcinoma.  Patient also had a chest tube placement on the right side fluid sent for cytology consistent with malignant effusion. This was eventually removed.     9/15/2022 -MRI brain with and without contrast with no evidence of metastatic disease    2022 -completed palliative radiation to lung mass.    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  Tumor mutational burden is high at 11.7  PD-L1 TPS 1% positive    Patient was eventually discharged    I had a discussion with patient about treatment options.  He did not want chemotherapy.  We started him on immunotherapy only with pembrolizumab.    10/12/2022 -cycle 1 pembrolizumab    Past Medical History:   Diagnosis Date   • COPD (chronic obstructive pulmonary disease) (HCC)        Past Surgical History:   Procedure Laterality Date   • ABDOMINAL AORTIC ANEURYSM REPAIR     • APPENDECTOMY           Current Outpatient Medications:   •  aspirin 81 MG chewable tablet, Chew 81 mg Daily., Disp: , Rfl:   •  ondansetron (Zofran) 8 MG tablet, Take 1 tablet by mouth 3 (Three) Times a Day As Needed for Nausea or Vomiting., Disp: 30 tablet, Rfl: 5  •  traMADol (Ultram) 50 MG tablet, Take 0.5 tablets by mouth Every 6 (Six) Hours As Needed for Moderate Pain for up to 30 days., Disp: 60 tablet, Rfl: 0    No Known Allergies    Family History   Problem Relation  Age of Onset   • Heart disease Father    • Heart disease Mother    • Cancer Brother        Cancer-related family history includes Cancer in his brother.    Social History     Tobacco Use   • Smoking status: Former   • Smokeless tobacco: Never   Substance Use Topics   • Alcohol use: Never   • Drug use: Never     Social History     Social History Narrative   • Not on file        9/23/2022.      Objective:  Vital signs:  There were no vitals filed for this visit.  There is no height or weight on file to calculate BMI.  ECOG  (2) Ambulatory and capable of self care, unable to carry out work activity, up and about > 50% or waking hours    Physical Exam:   Physical Exam  Constitutional:       Appearance: Normal appearance.   HENT:      Head: Normocephalic and atraumatic.      Nose: Nose normal.      Mouth/Throat:      Mouth: Mucous membranes are moist.   Eyes:      Pupils: Pupils are equal, round, and reactive to light.   Cardiovascular:      Rate and Rhythm: Normal rate and regular rhythm.      Pulses: Normal pulses.      Heart sounds: No murmur heard.  Pulmonary:      Effort: Pulmonary effort is normal.      Breath sounds: Normal breath sounds.   Abdominal:      General: There is no distension.      Palpations: Abdomen is soft. There is no mass.      Tenderness: There is no abdominal tenderness.   Musculoskeletal:         General: Normal range of motion.      Cervical back: Normal range of motion.   Skin:     General: Skin is warm.   Neurological:      General: No focal deficit present.      Mental Status: He is alert.   Psychiatric:         Mood and Affect: Mood normal.         Lab Results - Last 18 Months   Lab Units 10/12/22  1030 10/03/22  1431 09/20/22  0001   WBC 10*3/mm3 8.15 4.96 7.60   HEMOGLOBIN g/dL 15.4 14.0 15.2   HEMATOCRIT % 49.2 44.0 47.5   PLATELETS 10*3/mm3 167 192 180   MCV fL 90.4 88.5 86.4     Lab Results - Last 18 Months   Lab Units 10/12/22  1030 09/20/22  0001 09/18/22  1355   SODIUM mmol/L 137  133* 131*   POTASSIUM mmol/L 4.0 4.7 5.0   CHLORIDE mmol/L 96* 95* 93*   CO2 mmol/L 29.0 31.0* 32.0*   BUN mg/dL 11 18 13   CREATININE mg/dL 0.68* 0.89 0.73*   CALCIUM mg/dL 11.2* 11.8* 11.8*   BILIRUBIN mg/dL 0.9 0.5 0.7   ALK PHOS U/L 102 107 108   ALT (SGPT) U/L 7 34 22   AST (SGOT) U/L 17 33 24   GLUCOSE mg/dL 100* 160* 211*       Lab Results   Component Value Date    GLUCOSE 100 (H) 10/12/2022    BUN 11 10/12/2022    CREATININE 0.68 (L) 10/12/2022    EGFRIFNONA 76 09/29/2021    BCR 16.2 10/12/2022    K 4.0 10/12/2022    CO2 29.0 10/12/2022    CALCIUM 11.2 (H) 10/12/2022    ALBUMIN 3.60 10/12/2022    AST 17 10/12/2022    ALT 7 10/12/2022       Lab Results - Last 18 Months   Lab Units 09/14/22  0743   INR  1.05       No results found for: IRON, TIBC, FERRITIN    No results found for: FOLATE    No results found for: OCCULTBLD    No results found for: RETICCTPCT  No results found for: [id number]  No results found for: SPEP, UPEP  No results found for: LDH, URICACID  No results found for: SULEIMAN, RF, SEDRATE  No results found for: FIBRINOGEN, HAPTOGLOBIN  Lab Results   Component Value Date    INR 1.05 09/14/2022     No results found for:   No results found for: CEA  No components found for: CA-19-9  Lab Results   Component Value Date    PSA 2.750 09/29/2021     No results found for: SEDRATE    Assessment & Plan          Non-small cell lung cancer  Patient has stage IV disease with pleural effusion  Complete staging we obtained CT abdomen, MRI brain, CT abdomen showing left adrenal nodule, infrarenal abdominal aortic aneurysm 5.8 cm.  MRI brain was negative  I have discussed case with radiation oncology with significant shortness of breath, potential for worsening as we pursue molecular testing and the fact this is not a non-small cell lung cancer.  We have completed palliative radiation treatment to start with this will help with his shortness of breath, pain associated with the tumor.   [email or web address] ration  sequencing was sent.  Results are pending.  I discussed treatment options including chemotherapy, immunotherapy versus targeted treatment based on [employer] ration sequencing.  Patient has declined any type of chemotherapy.  He is willing to pursue immunotherapy if that is an option.  Eventual PD-L1 with positive results, treatment rotation burden high  Patient started on pembrolizumab.  Tolerating well no side effects.  Continue immunotherapy for now         Infrarenal abdominal aortic aneurysm  Vascular surgery outpatient consultation.  We will send a referral on follow-up.            Thank you very much for providing the opportunity to participate in this patient’s care. Please do not hesitate to call if there are any other questions.

## 2022-11-04 NOTE — TELEPHONE ENCOUNTER
Caller: Deric Wylie    Relationship: Self    Best call back number: [phone]    What does billing need from the patient: PATIENT REQUESTING TO CLARIFY IF HE CAN PAY BALANCE AT OFFICE AND TO VERIFY AMOUNT.

## 2022-11-22 NOTE — PROGRESS NOTES
HEMATOLOGY ONCOLOGY OUTPATIENT FOLLOW UP       Patient name: Deric Wylie  : 1933  MRN: [id number]  Primary Care Physician: Bernardo Singh MD  Referring Physician: Bernardo Singh,*  Reason For Consult: Non-small cell lung cancer        History of Present Illness:    Patient is a 89 y.o.  male who presented to the hospital with shortness of breath CT findings concerning for right upper lobe mass encasing SVC, pulmonary artery.  Patient had a CT-guided biopsy on 2022 consistent with invasive moderately differentiated squamous cell carcinoma.  Patient also had a chest tube placement on the right side fluid sent for cytology consistent with malignant effusion. This was eventually removed.     9/15/2022 -MRI brain with and without contrast with no evidence of metastatic disease    2022 -completed palliative radiation to lung mass.    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  Tumor mutational burden is high at 11.7  PD-L1 TPS 1% positive    Patient was eventually discharged    I had a discussion with patient about treatment options.  He did not want chemotherapy.  We started him on immunotherapy only with pembrolizumab.    10/12/2022 -cycle 1 pembrolizumab  2022 - cycle 2  2022 - cycle 3    Patient has ongoing shortness of breath, has not worsened.    Past Medical History:   Diagnosis Date   • COPD (chronic obstructive pulmonary disease) (HCC)        Past Surgical History:   Procedure Laterality Date   • ABDOMINAL AORTIC ANEURYSM REPAIR     • APPENDECTOMY           Current Outpatient Medications:   •  aspirin 81 MG chewable tablet, Chew 81 mg Daily., Disp: , Rfl:   •  ondansetron (Zofran) 8 MG tablet, Take 1 tablet by mouth 3 (Three) Times a Day As Needed for Nausea or Vomiting., Disp: 30 tablet, Rfl: 5  •  traMADol (Ultram) 50 MG tablet, Take 1 tablet by mouth 2 (Two) Times a Day As Needed for Moderate Pain for  up to 30 days., Disp: 60 tablet, Rfl: 0    No Known Allergies    Family History   Problem Relation Age of Onset   • Heart disease Father    • Heart disease Mother    • Cancer Brother        Cancer-related family history includes Cancer in his brother.    Social History     Tobacco Use   • Smoking status: Former   • Smokeless tobacco: Never   Substance Use Topics   • Alcohol use: Never   • Drug use: Never     Social History     Social History Narrative   • Not on file        9/23/2022.      Objective:  Vital signs:  There were no vitals filed for this visit.  There is no height or weight on file to calculate BMI.  ECOG  (2) Ambulatory and capable of self care, unable to carry out work activity, up and about > 50% or waking hours    Physical Exam:   Physical Exam  Constitutional:       Appearance: Normal appearance.   HENT:      Head: Normocephalic and atraumatic.      Nose: Nose normal.      Mouth/Throat:      Mouth: Mucous membranes are moist.   Eyes:      Pupils: Pupils are equal, round, and reactive to light.   Cardiovascular:      Rate and Rhythm: Normal rate and regular rhythm.      Pulses: Normal pulses.      Heart sounds: No murmur heard.  Pulmonary:      Effort: Pulmonary effort is normal.      Breath sounds: Normal breath sounds.   Abdominal:      General: There is no distension.      Palpations: Abdomen is soft. There is no mass.      Tenderness: There is no abdominal tenderness.   Musculoskeletal:         General: Normal range of motion.      Cervical back: Normal range of motion and neck supple.   Skin:     General: Skin is warm.   Neurological:      General: No focal deficit present.      Mental Status: He is alert.   Psychiatric:         Mood and Affect: Mood normal.         Lab Results - Last 18 Months   Lab Units 11/02/22  1347 10/24/22  1226 10/12/22  1030   WBC 10*3/mm3 5.48 6.36 8.15   HEMOGLOBIN g/dL 14.0 14.7 15.4   HEMATOCRIT % 44.6 46.2 49.2   PLATELETS 10*3/mm3 153 177 167   MCV fL 89.4 88.3  90.4     Lab Results - Last 18 Months   Lab Units 11/02/22  1347 10/12/22  1030 09/20/22  0001   SODIUM mmol/L 141 137 133*   POTASSIUM mmol/L 3.5 4.0 4.7   CHLORIDE mmol/L 103 96* 95*   CO2 mmol/L 28.0 29.0 31.0*   BUN mg/dL 9 11 18   CREATININE mg/dL 0.61* 0.68* 0.89   CALCIUM mg/dL 10.3 11.2* 11.8*   BILIRUBIN mg/dL 0.6 0.9 0.5   ALK PHOS U/L 90 102 107   ALT (SGPT) U/L 12 7 34   AST (SGOT) U/L 19 17 33   GLUCOSE mg/dL 101* 100* 160*       Lab Results   Component Value Date    GLUCOSE 101 (H) 11/02/2022    BUN 9 11/02/2022    CREATININE 0.61 (L) 11/02/2022    EGFRIFNONA 76 09/29/2021    BCR 14.8 11/02/2022    K 3.5 11/02/2022    CO2 28.0 11/02/2022    CALCIUM 10.3 11/02/2022    ALBUMIN 3.40 (L) 11/02/2022    AST 19 11/02/2022    ALT 12 11/02/2022       Lab Results - Last 18 Months   Lab Units 09/14/22  0743   INR  1.05       No results found for: IRON, TIBC, FERRITIN    No results found for: FOLATE    No results found for: OCCULTBLD    No results found for: RETICCTPCT  No results found for: [id number]  No results found for: SPEP, UPEP  No results found for: LDH, URICACID  No results found for: SULEIMAN, RF, SEDRATE  No results found for: FIBRINOGEN, HAPTOGLOBIN  Lab Results   Component Value Date    INR 1.05 09/14/2022     No results found for:   No results found for: CEA  No components found for: CA-19-9  Lab Results   Component Value Date    PSA 2.750 09/29/2021     No results found for: SEDRATE    Assessment & Plan     Patient is a 89 yr old male with metastatic non small cell lung cancer on treatment with Pembrolizumab.     Non-small cell lung cancer  Patient has stage IV disease with pleural effusion  Complete staging we obtained CT abdomen, MRI brain, CT abdomen showing left adrenal nodule, infrarenal abdominal aortic aneurysm 5.8 cm.  MRI brain was negative  I have discussed case with radiation oncology with significant shortness of breath, potential for worsening as we pursue molecular testing and the  fact this is not a non-small cell lung cancer.  We have completed palliative radiation treatment to start with this will help with his shortness of breath, pain associated with the tumor.   [employer] ration sequencing was sent.  Results are pending.  I discussed treatment options including chemotherapy, immunotherapy versus targeted treatment based on [employer] ration sequencing.  Patient has declined any type of chemotherapy.  He is willing to pursue immunotherapy if that is an option.  Eventual PD-L1 with positive results, treatment mutation burden high  Patient started on pembrolizumab.    No immunotherapy related side effects. Continue the same, repeat imaging prior to follow up.    Constipation  miralax    Infrarenal abdominal aortic aneurysm  Vascular surgery outpatient consultation.  We will send a referral          Thank you very much for providing the opportunity to participate in this patient’s care. Please do not hesitate to call if there are any other questions.

## 2022-11-23 NOTE — PROGRESS NOTES
Pt here for Keytruda and MD visit.  Pt w/ no complaints at this time.  PIV started.  Pt seen by Dr. Delgado in f/u. Treatment given and pt tolerated.  IV removed.  Pt given AVS w/ next appointment and v/u.  Pt discharged from clinic.

## 2022-11-28 NOTE — TELEPHONE ENCOUNTER
Called pt to make him an apt for 11/30 to recheck his lab work. He stated he would call me back and let me know once he figured out if he had a ride or not. Call back information was given. He v/u.

## 2022-11-28 NOTE — TELEPHONE ENCOUNTER
----- Message from Veronica Delgado MD sent at 11/23/2022  4:24 PM EST -----  Repeat CMP next week

## 2022-12-01 PROBLEM — E86.0 DEHYDRATION: Status: ACTIVE | Noted: 2022-01-01

## 2022-12-02 NOTE — PROGRESS NOTES
Pt to the clinic for IV fluids.  PIV obtained.  Fluids given and tolerated well. AVS given prior to discharge.

## 2022-12-12 NOTE — PROGRESS NOTES
HEMATOLOGY ONCOLOGY OUTPATIENT FOLLOW UP       Patient name: Deric Wylie  : 1933  MRN: [id number]  Primary Care Physician: Bernardo Singh MD  Referring Physician: Bernardo Singh,*  Reason For Consult: Non-small cell lung cancer    History of Present Illness:    Patient is a 89 y.o.  male who presented to the hospital with shortness of breath CT findings concerning for right upper lobe mass encasing SVC, pulmonary artery.  Patient had a CT-guided biopsy on 2022 consistent with invasive moderately differentiated squamous cell carcinoma.  Patient also had a chest tube placement on the right side fluid sent for cytology consistent with malignant effusion. This was eventually removed.     9/15/2022 -MRI brain with and without contrast with no evidence of metastatic disease    2022 -completed palliative radiation to lung mass.    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  Tumor mutational burden is high at 11.7  PD-L1 TPS 1% positive    Patient was eventually discharged    I had a discussion with patient about treatment options.  He did not want chemotherapy.  We started him on immunotherapy only with pembrolizumab.    10/12/2022 -cycle 1 pembrolizumab  2022 - cycle 2  2022 - cycle 3  2022 -cycle 4    Subjective:  Shortness of breath is stable.  Continues to use 3 L of oxygen at home.  Tolerating treatment well no significant side effects other than fatigue.    Past Medical History:   Diagnosis Date   • COPD (chronic obstructive pulmonary disease) (HCC)        Past Surgical History:   Procedure Laterality Date   • ABDOMINAL AORTIC ANEURYSM REPAIR     • APPENDECTOMY           Current Outpatient Medications:   •  aspirin 81 MG chewable tablet, Chew 81 mg Daily., Disp: , Rfl:   •  ondansetron (Zofran) 8 MG tablet, Take 1 tablet by mouth 3 (Three) Times a Day As Needed for Nausea or Vomiting., Disp: 30 tablet,  "Rfl: 5  •  polyethylene glycol (MIRALAX) 17 g packet, Take 17 g by mouth Daily., Disp: 24 each, Rfl: 1  No current facility-administered medications for this visit.    No Known Allergies    Family History   Problem Relation Age of Onset   • Heart disease Father    • Heart disease Mother    • Cancer Brother        Cancer-related family history includes Cancer in his brother.    Social History     Tobacco Use   • Smoking status: Former   • Smokeless tobacco: Never   Substance Use Topics   • Alcohol use: Never   • Drug use: Never     Social History     Social History Narrative   • Not on file      Objective:  Vital signs:  Vitals:    12/14/22 1408   BP: 145/84   Pulse: 98   Resp: 20   Temp: 98 °F (36.7 °C)   SpO2: 91%   Weight: 66.7 kg (147 lb)   Height: 180.3 cm (71\")   PainSc: 0-No pain     Body mass index is 20.5 kg/m².  ECOG  (2) Ambulatory and capable of self care, unable to carry out work activity, up and about > 50% or waking hours    Physical Exam:   Physical Exam  Constitutional:       Appearance: Normal appearance.   HENT:      Head: Normocephalic and atraumatic.      Mouth/Throat:      Mouth: Mucous membranes are moist.   Eyes:      Extraocular Movements: Extraocular movements intact.      Pupils: Pupils are equal, round, and reactive to light.   Cardiovascular:      Rate and Rhythm: Normal rate and regular rhythm.      Pulses: Normal pulses.      Heart sounds: No murmur heard.  Pulmonary:      Effort: Pulmonary effort is normal.      Breath sounds: Normal breath sounds.   Abdominal:      General: There is no distension.      Palpations: Abdomen is soft. There is no mass.      Tenderness: There is no abdominal tenderness.   Musculoskeletal:         General: Normal range of motion.      Cervical back: Normal range of motion and neck supple.   Skin:     General: Skin is warm.   Neurological:      General: No focal deficit present.      Mental Status: He is alert.   Psychiatric:         Mood and Affect: Mood " normal.         Lab Results - Last 18 Months   Lab Units 12/14/22  1334 11/30/22  1403 11/23/22  1338   WBC 10*3/mm3 5.41 4.35 4.79   HEMOGLOBIN g/dL 14.1 14.5 13.9   HEMATOCRIT % 44.4 46.0 44.0   PLATELETS 10*3/mm3 135* 135* 132*   MCV fL 90.4 89.8 88.9     Lab Results - Last 18 Months   Lab Units 11/30/22  1403 11/23/22  1338 11/02/22  1347   SODIUM mmol/L 139 139 141   POTASSIUM mmol/L 3.9 3.6 3.5   CHLORIDE mmol/L 98 99 103   CO2 mmol/L 31.0* 30.0* 28.0   BUN mg/dL 11 12 9   CREATININE mg/dL 0.63* 0.60* 0.61*   CALCIUM mg/dL 11.0* 10.8* 10.3   BILIRUBIN mg/dL 1.4* 1.4* 0.6   ALK PHOS U/L 102 87 90   ALT (SGPT) U/L 9 11 12   AST (SGOT) U/L 16 16 19   GLUCOSE mg/dL 169* 123* 101*       Lab Results   Component Value Date    GLUCOSE 169 (H) 11/30/2022    BUN 11 11/30/2022    CREATININE 0.63 (L) 11/30/2022    EGFRIFNONA 76 09/29/2021    BCR 17.5 11/30/2022    K 3.9 11/30/2022    CO2 31.0 (H) 11/30/2022    CALCIUM 11.0 (H) 11/30/2022    ALBUMIN 3.90 11/30/2022    AST 16 11/30/2022    ALT 9 11/30/2022       Lab Results - Last 18 Months   Lab Units 09/14/22  0743   INR  1.05       No results found for: IRON, TIBC, FERRITIN    No results found for: FOLATE    No results found for: OCCULTBLD    No results found for: RETICCTPCT  No results found for: [id number]  No results found for: SPEP, UPEP  No results found for: LDH, URICACID  No results found for: SULEIMAN, RF, SEDRATE  No results found for: FIBRINOGEN, HAPTOGLOBIN  Lab Results   Component Value Date    INR 1.05 09/14/2022     No results found for:   No results found for: CEA  No components found for: CA-19-9  Lab Results   Component Value Date    PSA 2.750 09/29/2021     No results found for: SEDRATE    Assessment & Plan     Patient is a 89 yr old male with metastatic non small cell lung cancer on treatment with Pembrolizumab.     Non-small cell lung cancer  Patient has stage IV disease with pleural effusion  Complete staging we obtained CT abdomen, MRI brain, CT  abdomen showing left adrenal nodule, infrarenal abdominal aortic aneurysm 5.8 cm.  MRI brain was negative  I have discussed case with radiation oncology with significant shortness of breath, potential for worsening as we pursue molecular testing and the fact this is not a non-small cell lung cancer.  We have completed palliative radiation treatment to start with this will help with his shortness of breath, pain associated with the tumor.   [employer] ration sequencing was sent.  Results are pending.  I discussed treatment options including chemotherapy, immunotherapy versus targeted treatment based on [employer] ration sequencing.  Patient has declined any type of chemotherapy.  He is willing to pursue immunotherapy if that is an option.  Eventual PD-L1 with positive results, treatment mutation burden high  Patient started on pembrolizumab.    No immunotherapy related side effects.  Continue treatment for now.  Repeat imaging is scheduled for tomorrow.     Constipation  miralax as needed which helps.    Infrarenal abdominal aortic aneurysm  Vascular surgery outpatient consultation.        Thank you very much for providing the opportunity to participate in this patient’s care. Please do not hesitate to call if there are any other questions.

## 2022-12-14 NOTE — PROGRESS NOTES
Patient came in for keytruda without complaints. Seen by MD, no new orders at this time. Patient given next apts. Treatment tolerated.

## 2022-12-15 NOTE — PROGRESS NOTES
Per Dr. Delgado pt is coming in for 1L NS today prior to his scans. Misha Coffey pt could be added on the schedule at 1. Pt was notified of Dr. Langley order and time of infusion today. He v/u and stated he would be here. Scheduling was made aware and asked to add the pt on.

## 2022-12-15 NOTE — PROGRESS NOTES
RADIATION ONCOLOGY FOLLOW-UP NOTE    NAME: Deric Wylie  YOB: 1933  MRN #: [id number]  DATE OF SERVICE: 12/21/2022  REFERRING PROVIDER: Veronica Delgado MD  PRIMARY CARE PROVIDER: Bernardo Singh MD    DIAGNOSIS:  Stage IV SCC of the lung  Tumor mutational burden is high at 11.7    REASON FOR VISIT:  3M CT F/U    RADIATION TREATMENT COURSE:  2000 cGy in 5 fractions to RUL, completed 09/23/2022.    HISTORY OF PRESENT ILLNESS: The patient is a 89 y.o. year old male who was last seen in our office on 09/23/2022 upon completion of radiation therapy treatment.    He follows with Dr. Delgado, and was last seen on 12/14/2022. Their plan is to continue immunotherapy with pembrolizumab, have repeat imaging, and follow up in about 6 weeks on 01/25/2023.    CT CHEST W/ CONTRAST  DATE OF EXAM: 12/15/2022  FACILITY: [facility]  FINDINGS:  An abnormal soft tissue mass is again noted at the anterior medial  aspect of the right upper hemithorax. This finding measures 3.4 x 5.7 cm  on today's study. The lesion has mildly decreased in size in AP  diameter. There remains compressive consolidation/atelectasis of the  right middle lobe and right lower lobe. A large right pleural effusion  is seen. 2 small pulmonary nodules are noted within the remaining  aerated portion of the right upper lobe which are stable measuring up to  5 mm. The lesion extends into the superior margin of the right hilum.  There is associated abnormal soft tissue attenuation involving the right  hilum which is similar in appearance when compared to the prior.     Scattered pulmonary nodules are seen throughout the left upper lobe and  left lower lobe. The nodules measure up to approximately 1 cm in the  superior segment of the left lower lobe laterally. The findings suggest  metastatic foci. There is a small left pleural effusion. Mild  atelectasis is noted in the left lower lobe. Severe changes of emphysema  are  noted.     No significant right hilar lymphadenopathy is seen. The abnormal soft  tissue attenuation involving the right hilum also extends into the right  aspect of the mediastinum in the pericarinal region. These changes are  stable. Otherwise no additional mediastinal lymphadenopathy is observed.  No axillary lymphadenopathy is seen. The heart and great vessels are  stable. The thyroid gland is stable. The esophagus is unremarkable.     The limited evaluation of the upper abdomen demonstrates no evidence for  acute abnormality. An abdominal aortic aneurysm is noted involving the  upper abdomen. This finding appears stable. A stable left adrenal mass  is noted suggesting a left adrenal gland.     No acute osseous abnormalities are observed.  IMPRESSION:  1.     Evidence for mild decrease in size of the soft tissue mass at the  anterior medial aspect of the right upper hemithorax.  2.     There remains extensive compressive atelectasis/consolidation  throughout the right lower lobe and right middle lobe. A large right  pleural effusion is seen.  3.     Stable small nodules in the remaining aerated portion of the  right upper lobe.  4.     Multiple pulmonary nodules are seen throughout the left lung  indicating metastatic foci. The largest nodule measures 1 cm in the  superior segment of the left lower lobe.  5.     There remains evidence for extension into the region of the right  hilum and right aspect of the mediastinum in the pericarinal region.  These changes are stable.    Patient had refused/declined any type of chemotherapy; PD-L1 was positive test results, treatment mutation burden was high  10/12/2022 -cycle 1 pembrolizumab  11/2/2022 - cycle 2  11/23/2022 - cycle 3  12/14/2022 -cycle 4    The following portions of the patient's history were reviewed and updated as appropriate: allergies, current medications, past family history, past medical history, past social history, past surgical history and problem  list. Reviewed with the patient and remain unchanged.    PAST MEDICAL HISTORY:  he has a past medical history of COPD (chronic obstructive pulmonary disease) (HCC).    MEDICATIONS:    Current Outpatient Medications:   •  aspirin 81 MG chewable tablet, Chew 81 mg Daily., Disp: , Rfl:   •  ondansetron (Zofran) 8 MG tablet, Take 1 tablet by mouth 3 (Three) Times a Day As Needed for Nausea or Vomiting., Disp: 30 tablet, Rfl: 5  •  polyethylene glycol (MIRALAX) 17 g packet, Take 17 g by mouth Daily., Disp: 24 each, Rfl: 1  No current facility-administered medications for this visit.    Facility-Administered Medications Ordered in Other Visits:   •  sodium chloride 0.9 % bolus 1,000 mL, 1,000 mL, Intravenous, Once, Veronica Delgado MD, Last Rate: 1,000 mL/hr at 12/15/22 1332, 1,000 mL at 12/15/22 1332    ALLERGIES:  No Known Allergies    PAST SURGICAL HISTORY:  he has a past surgical history that includes AAA repair, open and Appendectomy.    PREVIOUS RADIOTHERAPY OR CHEMOTHERAPY:  As noted above    FAMILY HISTORY:  hisfamily history includes Cancer in his brother; Heart disease in his father and mother.    SOCIAL HISTORY:  he reports that he has quit smoking. He has never used smokeless tobacco. He reports that he does not drink alcohol and does not use drugs.    PAIN AND PAIN MANAGEMENT:  Deric Wylie reports a pain score of 2.  Given his pain assessment as noted, treatment options were discussed and the following options were decided upon as a follow-up plan to address the patient's pain: continuation of current treatment plan for pain.      NUTRITIONAL STATUS:   no issues    KPS:  70      REVIEW OF SYSTEMS:   Review of Systems     Otherwise negative as below.     General: No fevers, chills, weight change, or drenching night sweats. Skin: No rashes or jaundice.  HEENT: No change in vision or hearing, no headaches.  Neck: No dysphagia or masses.  Heme/Lymph: No easy bruising or bleeding.  Respiratory System: as noted  above.  Cardiovascular: No chest pain, palpitations, or dyspnea on exertion.  - Pacemaker. GI: No nausea, vomiting, diarrhea, melena, or hematochezia.  : No dysuria or hematuria.  Endocrine: No heat or cold intolerance. Musculoskeletal: No myalgias or arthralgias.  Neuro: No weakness, numbness, syncope, or seizures. Psych: No mood changes or depression. Ext: Denies swelling.        Objective   VITAL SIGNS:  Vitals:    12/21/22 1112   BP: 107/65   Pulse: 106   Resp: 20   Temp: 98.6 °F (37 °C)   SpO2: (!) 86%         PHYSICAL EXAM:  GENERAL: In no apparent distress, sitting comfortably in room.    HEENT: Normocephalic, atraumatic. Pupils are equal, round, reactive to light. Sclera anicteric. Conjunctiva not injected. Oropharynx without erythema, ulcerations or thrush.   NECK: Supple with no masses.  LYMPHATIC: No cervical, supraclavicular or axillary adenopathy appreciated bilaterally.   CARDIOVASCULAR: S1 & S2 detected; no murmurs, rubs or gallops.  CHEST: Clear to auscultation bilaterally; no wheezes, crackles or rubs. Work of breathing normal.  ABDOMEN: Bowel sounds present. Abdomen is soft, nontender, nondistended.   MUSCULOSKELETAL: No tenderness to palpation along the spine or scapulae. Normal range of motion.  EXTREMITIES: No clubbing, cyanosis, edema.  SKIN: No erythema, rashes, ulcerations noted.   NEUROLOGIC: Cranial nerves II-XII grossly intact bilaterally. No focal neurologic deficits.  PSYCHIATRIC:  Alert, aware, and appropriate.      PERTINENT IMAGING/PATHOLOGY/LABS (Medical Decision Making):     COORDINATION OF CARE: A copy of this note is sent to the referring provider.    PATHOLOGY (Reviewed):     IMAGING (Reviewed):     LABS (Reviewed):  HEMATOLOGY:  WBC   Date Value Ref Range Status   12/14/2022 5.41 3.40 - 10.80 10*3/mm3 Final     RBC   Date Value Ref Range Status   12/14/2022 4.91 4.14 - 5.80 10*6/mm3 Final     Hemoglobin   Date Value Ref Range Status   12/14/2022 14.1 13.0 - 17.7 g/dL Final      Hematocrit   Date Value Ref Range Status   12/14/2022 44.4 37.5 - 51.0 % Final     Platelets   Date Value Ref Range Status   12/14/2022 135 (L) 140 - 450 10*3/mm3 Final     CHEMISTRY:  Lab Results   Component Value Date    GLUCOSE 168 (H) 12/14/2022    BUN 14 12/14/2022    CREATININE 0.61 (L) 12/14/2022    EGFRIFNONA 76 09/29/2021    BCR 23.0 12/14/2022    K 3.9 12/14/2022    CO2 30.0 (H) 12/14/2022    CALCIUM 11.1 (H) 12/14/2022    ALBUMIN 3.80 12/14/2022    AST 17 12/14/2022    ALT 9 12/14/2022       Assessment & Plan   ASSESSMENT AND PLAN:    Stage IV SCC of the lung  Tumor mutational burden is high at 11.7  -On Pembro  Completed RUL XRT in palliative setting almost 3 months ago.    No role for further palliative XRT at this time; will remain available.  Continue close f/u with Med/onc and will schedule as PRN here    This assessment comes from my review of the imaging, pathology, physician notes and other pertinent information as mentioned.    DISPOSITION: discussed with med/onc          Approved by:     Александр Salinas MD

## 2022-12-15 NOTE — PROGRESS NOTES
Pt. Was here at clinic for IVF's prior to his CT scan.   IV access obtained (24g. LAC) per Rowan GAGNON RN, no labs drawn today.   IVF's given as ordered and pt. Tolerated well.   Pt. Discharged from clinic with IV in place for CT scan.   I called radiology and spoke with Marlin. I informed her that pt. Is on his way over and he has an IV in place. Marlin stated they will not be able to use the 24g. IV site.   I informed Marlin if not able to use, ok to DC IV, pt. Is not coming back over to the cancer center for anything after his CT scan.   Marlin verbalized understanding.

## 2022-12-20 NOTE — TELEPHONE ENCOUNTER
Called and made the pt aware that Dr. Delgado had ordered a US thoracentesis for him. The pt does not hear well over the phone but did v/u. Pt has an appt with Dr. Salinas tomorrow, Gabby TREADWELL was asked to remind the pt and make sure he understood what Dr. Delgado had ordered.     Order placed.

## 2022-12-21 NOTE — NURSING NOTE
Dr. Le is using ultrasound guidance to rachael area on pt's right back for right thoracentesis procedure. Right back was then prepped in sterile fashion using chlorhexidine scrub.

## 2022-12-21 NOTE — NURSING NOTE
Additional 500 mL drained and will continue to drain an additional 500 mL for a total of 3000 mL pleural fluid drained.

## 2022-12-21 NOTE — NURSING NOTE
Two liters drained from right pleural space. Pt denies any pain or increased SOA. Dr. Le updated on vital signs. Dr. Le states to drain an additional 500 mL and then reassess pt and his vital signs.

## 2022-12-21 NOTE — NURSING NOTE
Pt brought back to IR dept via wheelchair, on 3L oxygen via N/C, with face mask in place. Pt is alert and oriented x4. Pt is relaxed and cooperative. Pt skin is flesh, warm, and dry. Pt changed into hospital gown with assistance from IR RN. Pt remains on wall oxygen at 3L via N/C. Pt was educated on thoracentesis procedure and voiced understanding. Pt is waiting to speak with physician.

## 2022-12-21 NOTE — NURSING NOTE
Dr. Le placed a 4F drainage catheter into pt's right pleural space and pleural fluid is draining well into vacutainer.

## 2022-12-21 NOTE — DISCHARGE INSTRUCTIONS
KEEP DRESSING ON FOR THE NEXT 48 HOURS. YOU MAY THEN REMOVE IT AND SHOWER AS USUAL. NO HEAVY LIFTING GREATER THAN 10 POUNDS FOR THE NEXT 24 HOURS. WATCH FOR SIGNS AND SYMPTOMS OF INFECTION SUCH AS REDNESS, SWELLING, DRAINAGE, OR UNEXPLAINED FEVER. IF ANY SYMPTOMS ARISE. SEE YOUR PHYSICIAN. FOLLOW UP WITH YOUR PHYSICIAN, AS USUAL.  ANY QUESTIONS, YOU CAN CALL THE INTERVENTIONAL RADIOLOGY DEPT -[phone], M-F 8-4:30. IF AFTER HOURS, FOLLOW PROMPTS ON PHONE LINE.

## 2022-12-21 NOTE — NURSING NOTE
Drainage catheter removed from pt's right pleural space, intact, and local dressing of bacitracin, 2x2, and tegaderm applied to right back. Dressing is labeled, dry and intact.

## 2022-12-21 NOTE — NURSING NOTE
Pt taken via wheelchair to front  hospital to meet his ride. Pt was discharged home in stable condition on 3L oxygen via N/C.

## 2022-12-21 NOTE — NURSING NOTE
Pt assisted to sitting upright on edge of stretcher with arms up and over tray table. Pt then placed on blood pressure and oxygen sat monitoring. Pt remains on 3L oxygen via N/C.

## 2022-12-21 NOTE — NURSING NOTE
Pt educated on his discharge instructions and voiced understanding. Pt provided paper copy to take home.

## 2022-12-21 NOTE — NURSING NOTE
Pleural fluid is draining well and second vacutainer started. Dr. Le stated to re-evaluate patient after 2 liters drained. Pt is tolerating procedure well.

## 2022-12-21 NOTE — NURSING NOTE
Post procedure CXR completed and Dr. Le reviewed it. Dr. Le reports no PTX at this time and pt is stable to be discharged home. Pt denies any pain and reports that he is breathing easy.

## 2023-01-01 ENCOUNTER — APPOINTMENT (OUTPATIENT)
Dept: GENERAL RADIOLOGY | Facility: HOSPITAL | Age: 88
DRG: 871 | End: 2023-01-01
Payer: MEDICARE

## 2023-01-01 ENCOUNTER — HOSPITAL ENCOUNTER (OUTPATIENT)
Facility: HOSPITAL | Age: 88
Setting detail: OBSERVATION
End: 2023-03-29
Attending: EMERGENCY MEDICINE | Admitting: STUDENT IN AN ORGANIZED HEALTH CARE EDUCATION/TRAINING PROGRAM
Payer: OTHER MISCELLANEOUS

## 2023-01-01 ENCOUNTER — HOSPITAL ENCOUNTER (OUTPATIENT)
Dept: ONCOLOGY | Facility: HOSPITAL | Age: 88
End: 2023-01-01
Payer: MEDICARE

## 2023-01-01 ENCOUNTER — OFFICE VISIT (OUTPATIENT)
Dept: ONCOLOGY | Facility: CLINIC | Age: 88
End: 2023-01-01
Payer: MEDICARE

## 2023-01-01 ENCOUNTER — HOSPITAL ENCOUNTER (INPATIENT)
Facility: HOSPITAL | Age: 88
LOS: 3 days | Discharge: HOSPICE | DRG: 871 | End: 2023-03-10
Attending: EMERGENCY MEDICINE | Admitting: INTERNAL MEDICINE
Payer: MEDICARE

## 2023-01-01 ENCOUNTER — HOSPITAL ENCOUNTER (OUTPATIENT)
Dept: ONCOLOGY | Facility: HOSPITAL | Age: 88
Discharge: HOME | End: 2023-02-15
Admitting: INTERNAL MEDICINE
Payer: MEDICARE

## 2023-01-01 ENCOUNTER — TELEPHONE (OUTPATIENT)
Dept: SURGERY | Facility: CLINIC | Age: 88
End: 2023-01-01
Payer: MEDICARE

## 2023-01-01 ENCOUNTER — HOSPITAL ENCOUNTER (OUTPATIENT)
Dept: GENERAL RADIOLOGY | Facility: HOSPITAL | Age: 88
Discharge: HOME | End: 2023-02-22
Payer: MEDICARE

## 2023-01-01 ENCOUNTER — ANESTHESIA (OUTPATIENT)
Dept: PERIOP | Facility: HOSPITAL | Age: 88
DRG: 871 | End: 2023-01-01
Payer: MEDICARE

## 2023-01-01 ENCOUNTER — HOSPITAL ENCOUNTER (OUTPATIENT)
Dept: ONCOLOGY | Facility: HOSPITAL | Age: 88
Discharge: HOME | End: 2023-03-08
Payer: MEDICARE

## 2023-01-01 ENCOUNTER — HOSPITAL ENCOUNTER (OUTPATIENT)
Dept: INTERVENTIONAL RADIOLOGY/VASCULAR | Facility: HOSPITAL | Age: 88
Discharge: HOME | End: 2023-02-22
Payer: MEDICARE

## 2023-01-01 ENCOUNTER — HOSPITAL ENCOUNTER (OUTPATIENT)
Dept: ONCOLOGY | Facility: HOSPITAL | Age: 88
Setting detail: INFUSION SERIES
Discharge: HOME | End: 2023-01-04
Payer: MEDICARE

## 2023-01-01 ENCOUNTER — HOSPITAL ENCOUNTER (OUTPATIENT)
Dept: ONCOLOGY | Facility: HOSPITAL | Age: 88
Setting detail: INFUSION SERIES
Discharge: HOME | End: 2023-01-25
Payer: MEDICARE

## 2023-01-01 ENCOUNTER — ANESTHESIA EVENT (OUTPATIENT)
Dept: PERIOP | Facility: HOSPITAL | Age: 88
DRG: 871 | End: 2023-01-01
Payer: MEDICARE

## 2023-01-01 VITALS
RESPIRATION RATE: 17 BRPM | SYSTOLIC BLOOD PRESSURE: 121 MMHG | OXYGEN SATURATION: 97 % | HEIGHT: 71 IN | DIASTOLIC BLOOD PRESSURE: 64 MMHG | WEIGHT: 130 LBS | HEART RATE: 96 BPM | BODY MASS INDEX: 18.2 KG/M2

## 2023-01-01 VITALS
TEMPERATURE: 97.4 F | SYSTOLIC BLOOD PRESSURE: 111 MMHG | WEIGHT: 137.2 LBS | BODY MASS INDEX: 19.14 KG/M2 | DIASTOLIC BLOOD PRESSURE: 70 MMHG | OXYGEN SATURATION: 92 % | RESPIRATION RATE: 18 BRPM | HEART RATE: 105 BPM

## 2023-01-01 VITALS
DIASTOLIC BLOOD PRESSURE: 64 MMHG | WEIGHT: 141 LBS | HEIGHT: 71 IN | HEART RATE: 95 BPM | SYSTOLIC BLOOD PRESSURE: 105 MMHG | RESPIRATION RATE: 20 BRPM | BODY MASS INDEX: 19.74 KG/M2 | OXYGEN SATURATION: 93 % | TEMPERATURE: 97.8 F

## 2023-01-01 VITALS
HEIGHT: 71 IN | SYSTOLIC BLOOD PRESSURE: 105 MMHG | WEIGHT: 141 LBS | RESPIRATION RATE: 20 BRPM | OXYGEN SATURATION: 93 % | BODY MASS INDEX: 19.74 KG/M2 | TEMPERATURE: 97.8 F | DIASTOLIC BLOOD PRESSURE: 64 MMHG | HEART RATE: 95 BPM

## 2023-01-01 VITALS
BODY MASS INDEX: 19.18 KG/M2 | WEIGHT: 137 LBS | DIASTOLIC BLOOD PRESSURE: 70 MMHG | HEIGHT: 71 IN | HEART RATE: 105 BPM | TEMPERATURE: 97.4 F | OXYGEN SATURATION: 92 % | SYSTOLIC BLOOD PRESSURE: 111 MMHG | RESPIRATION RATE: 18 BRPM

## 2023-01-01 VITALS
DIASTOLIC BLOOD PRESSURE: 67 MMHG | SYSTOLIC BLOOD PRESSURE: 118 MMHG | RESPIRATION RATE: 18 BRPM | HEART RATE: 67 BPM | BODY MASS INDEX: 19.75 KG/M2 | TEMPERATURE: 97.5 F | OXYGEN SATURATION: 90 % | WEIGHT: 141.6 LBS

## 2023-01-01 VITALS
WEIGHT: 126.1 LBS | BODY MASS INDEX: 17.08 KG/M2 | HEIGHT: 72 IN | TEMPERATURE: 97.7 F | OXYGEN SATURATION: 99 % | RESPIRATION RATE: 20 BRPM | SYSTOLIC BLOOD PRESSURE: 156 MMHG | HEART RATE: 109 BPM | DIASTOLIC BLOOD PRESSURE: 97 MMHG

## 2023-01-01 VITALS
WEIGHT: 126 LBS | HEIGHT: 72 IN | DIASTOLIC BLOOD PRESSURE: 34 MMHG | HEART RATE: 90 BPM | RESPIRATION RATE: 18 BRPM | OXYGEN SATURATION: 90 % | SYSTOLIC BLOOD PRESSURE: 65 MMHG | BODY MASS INDEX: 17.07 KG/M2

## 2023-01-01 DIAGNOSIS — J43.2 CENTRILOBULAR EMPHYSEMA: ICD-10-CM

## 2023-01-01 DIAGNOSIS — C34.91 SQUAMOUS CELL CARCINOMA OF LUNG, STAGE IV, RIGHT: Primary | ICD-10-CM

## 2023-01-01 DIAGNOSIS — C34.11 MALIGNANT NEOPLASM OF UPPER LOBE OF RIGHT LUNG: Primary | ICD-10-CM

## 2023-01-01 DIAGNOSIS — E86.0 DEHYDRATION: ICD-10-CM

## 2023-01-01 DIAGNOSIS — R65.20 SEPSIS WITH ACUTE ORGAN DYSFUNCTION WITHOUT SEPTIC SHOCK, DUE TO UNSPECIFIED ORGANISM, UNSPECIFIED TYPE: ICD-10-CM

## 2023-01-01 DIAGNOSIS — R53.1 WEAKNESS: Primary | ICD-10-CM

## 2023-01-01 DIAGNOSIS — A41.9 SEPSIS WITH ACUTE ORGAN DYSFUNCTION WITHOUT SEPTIC SHOCK, DUE TO UNSPECIFIED ORGANISM, UNSPECIFIED TYPE: ICD-10-CM

## 2023-01-01 DIAGNOSIS — J90 PLEURAL EFFUSION: Primary | ICD-10-CM

## 2023-01-01 DIAGNOSIS — J90 PLEURAL EFFUSION: ICD-10-CM

## 2023-01-01 DIAGNOSIS — J44.1 ACUTE EXACERBATION OF CHRONIC OBSTRUCTIVE PULMONARY DISEASE (COPD): ICD-10-CM

## 2023-01-01 DIAGNOSIS — J91.0 MALIGNANT PLEURAL EFFUSION: ICD-10-CM

## 2023-01-01 DIAGNOSIS — J18.9 POSTOBSTRUCTIVE PNEUMONIA: ICD-10-CM

## 2023-01-01 DIAGNOSIS — C34.11 MALIGNANT NEOPLASM OF UPPER LOBE OF RIGHT LUNG: ICD-10-CM

## 2023-01-01 DIAGNOSIS — G89.3 CANCER RELATED PAIN: ICD-10-CM

## 2023-01-01 LAB
ALBUMIN SERPL-MCNC: 2.9 G/DL (ref 3.5–5.2)
ALBUMIN SERPL-MCNC: 3.6 G/DL (ref 3.5–5.2)
ALBUMIN SERPL-MCNC: 3.7 G/DL (ref 3.5–5.2)
ALBUMIN SERPL-MCNC: 4 G/DL (ref 3.5–5.2)
ALBUMIN/GLOB SERPL: 0.9 G/DL
ALBUMIN/GLOB SERPL: 1.1 G/DL
ALBUMIN/GLOB SERPL: 1.3 G/DL
ALBUMIN/GLOB SERPL: 1.4 G/DL
ALP SERPL-CCNC: 106 U/L (ref 39–117)
ALP SERPL-CCNC: 110 U/L (ref 39–117)
ALP SERPL-CCNC: 123 U/L (ref 39–117)
ALP SERPL-CCNC: 89 U/L (ref 39–117)
ALT SERPL W P-5'-P-CCNC: 10 U/L (ref 1–41)
ALT SERPL W P-5'-P-CCNC: 11 U/L (ref 1–41)
ALT SERPL W P-5'-P-CCNC: 32 U/L (ref 1–41)
ALT SERPL W P-5'-P-CCNC: 6 U/L (ref 1–41)
ANION GAP SERPL CALCULATED.3IONS-SCNC: 10 MMOL/L (ref 5–15)
ANION GAP SERPL CALCULATED.3IONS-SCNC: 15 MMOL/L (ref 5–15)
ANION GAP SERPL CALCULATED.3IONS-SCNC: 15 MMOL/L (ref 5–15)
ANION GAP SERPL CALCULATED.3IONS-SCNC: 9 MMOL/L (ref 5–15)
ANION GAP SERPL CALCULATED.3IONS-SCNC: 9 MMOL/L (ref 5–15)
ARTERIAL PATENCY WRIST A: POSITIVE
AST SERPL-CCNC: 15 U/L (ref 1–40)
AST SERPL-CCNC: 16 U/L (ref 1–40)
AST SERPL-CCNC: 17 U/L (ref 1–40)
AST SERPL-CCNC: 82 U/L (ref 1–40)
ATMOSPHERIC PRESS: ABNORMAL MM[HG]
BACTERIA SPEC AEROBE CULT: NORMAL
BACTERIA UR QL AUTO: ABNORMAL /HPF
BASE EXCESS BLDA CALC-SCNC: 0.1 MMOL/L (ref 0–3)
BASOPHILS # BLD AUTO: 0 10*3/MM3 (ref 0–0.2)
BASOPHILS # BLD AUTO: 0.01 10*3/MM3 (ref 0–0.2)
BASOPHILS # BLD AUTO: 0.02 10*3/MM3 (ref 0–0.2)
BASOPHILS # BLD AUTO: 0.02 10*3/MM3 (ref 0–0.2)
BASOPHILS NFR BLD AUTO: 0.1 % (ref 0–1.5)
BASOPHILS NFR BLD AUTO: 0.2 % (ref 0–1.5)
BASOPHILS NFR BLD AUTO: 0.2 % (ref 0–1.5)
BASOPHILS NFR BLD AUTO: 0.3 % (ref 0–1.5)
BDY SITE: ABNORMAL
BILIRUB SERPL-MCNC: 0.7 MG/DL (ref 0–1.2)
BILIRUB SERPL-MCNC: 0.9 MG/DL (ref 0–1.2)
BILIRUB SERPL-MCNC: 1.3 MG/DL (ref 0–1.2)
BILIRUB SERPL-MCNC: 1.4 MG/DL (ref 0–1.2)
BILIRUB UR QL STRIP: ABNORMAL
BUN SERPL-MCNC: 17 MG/DL (ref 8–23)
BUN SERPL-MCNC: 18 MG/DL (ref 8–23)
BUN SERPL-MCNC: 19 MG/DL (ref 8–23)
BUN SERPL-MCNC: 50 MG/DL (ref 8–23)
BUN SERPL-MCNC: 51 MG/DL (ref 8–23)
BUN/CREAT SERPL: 24.3 (ref 7–25)
BUN/CREAT SERPL: 25 (ref 7–25)
BUN/CREAT SERPL: 25.3 (ref 7–25)
BUN/CREAT SERPL: 58.8 (ref 7–25)
BUN/CREAT SERPL: 66.2 (ref 7–25)
CALCIUM SPEC-SCNC: 10.6 MG/DL (ref 8.2–9.6)
CALCIUM SPEC-SCNC: 10.9 MG/DL (ref 8.6–10.5)
CALCIUM SPEC-SCNC: 11.2 MG/DL (ref 8.6–10.5)
CALCIUM SPEC-SCNC: 11.4 MG/DL (ref 8.6–10.5)
CALCIUM SPEC-SCNC: 11.5 MG/DL (ref 8.2–9.6)
CHLORIDE SERPL-SCNC: 102 MMOL/L (ref 98–107)
CHLORIDE SERPL-SCNC: 95 MMOL/L (ref 98–107)
CHLORIDE SERPL-SCNC: 96 MMOL/L (ref 98–107)
CHLORIDE SERPL-SCNC: 97 MMOL/L (ref 98–107)
CHLORIDE SERPL-SCNC: 98 MMOL/L (ref 98–107)
CLARITY UR: ABNORMAL
CO2 BLDA-SCNC: 27.4 MMOL/L (ref 22–29)
CO2 SERPL-SCNC: 24 MMOL/L (ref 22–29)
CO2 SERPL-SCNC: 27 MMOL/L (ref 22–29)
CO2 SERPL-SCNC: 29 MMOL/L (ref 22–29)
CO2 SERPL-SCNC: 29 MMOL/L (ref 22–29)
CO2 SERPL-SCNC: 30 MMOL/L (ref 22–29)
COLOR UR: ABNORMAL
CREAT SERPL-MCNC: 0.7 MG/DL (ref 0.76–1.27)
CREAT SERPL-MCNC: 0.72 MG/DL (ref 0.76–1.27)
CREAT SERPL-MCNC: 0.75 MG/DL (ref 0.76–1.27)
CREAT SERPL-MCNC: 0.77 MG/DL (ref 0.76–1.27)
CREAT SERPL-MCNC: 0.85 MG/DL (ref 0.76–1.27)
D-LACTATE SERPL-SCNC: 3.2 MMOL/L (ref 0.5–2)
D-LACTATE SERPL-SCNC: 5.5 MMOL/L (ref 0.3–2)
DEPRECATED RDW RBC AUTO: 46.8 FL (ref 37–54)
DEPRECATED RDW RBC AUTO: 47.3 FL (ref 37–54)
DEPRECATED RDW RBC AUTO: 47.7 FL (ref 37–54)
DEPRECATED RDW RBC AUTO: 50.3 FL (ref 37–54)
DEPRECATED RDW RBC AUTO: 51.4 FL (ref 37–54)
DEPRECATED RDW RBC AUTO: 53 FL (ref 37–54)
EGFRCR SERPLBLD CKD-EPI 2021: 82.5 ML/MIN/1.73
EGFRCR SERPLBLD CKD-EPI 2021: 85 ML/MIN/1.73
EGFRCR SERPLBLD CKD-EPI 2021: 86.3 ML/MIN/1.73
EGFRCR SERPLBLD CKD-EPI 2021: 87.3 ML/MIN/1.73
EGFRCR SERPLBLD CKD-EPI 2021: 88.1 ML/MIN/1.73
EOSINOPHIL # BLD AUTO: 0 10*3/MM3 (ref 0–0.4)
EOSINOPHIL # BLD AUTO: 0.06 10*3/MM3 (ref 0–0.4)
EOSINOPHIL # BLD AUTO: 0.14 10*3/MM3 (ref 0–0.4)
EOSINOPHIL # BLD AUTO: 0.22 10*3/MM3 (ref 0–0.4)
EOSINOPHIL NFR BLD AUTO: 0 % (ref 0.3–6.2)
EOSINOPHIL NFR BLD AUTO: 0.7 % (ref 0.3–6.2)
EOSINOPHIL NFR BLD AUTO: 2.1 % (ref 0.3–6.2)
EOSINOPHIL NFR BLD AUTO: 4.3 % (ref 0.3–6.2)
ERYTHROCYTE [DISTWIDTH] IN BLOOD BY AUTOMATED COUNT: 15.1 % (ref 12.3–15.4)
ERYTHROCYTE [DISTWIDTH] IN BLOOD BY AUTOMATED COUNT: 15.1 % (ref 12.3–15.4)
ERYTHROCYTE [DISTWIDTH] IN BLOOD BY AUTOMATED COUNT: 15.3 % (ref 12.3–15.4)
ERYTHROCYTE [DISTWIDTH] IN BLOOD BY AUTOMATED COUNT: 15.4 % (ref 12.3–15.4)
ERYTHROCYTE [DISTWIDTH] IN BLOOD BY AUTOMATED COUNT: 15.5 % (ref 12.3–15.4)
ERYTHROCYTE [DISTWIDTH] IN BLOOD BY AUTOMATED COUNT: 15.8 % (ref 12.3–15.4)
FLUAV SUBTYP SPEC NAA+PROBE: NOT DETECTED
FLUBV RNA ISLT QL NAA+PROBE: NOT DETECTED
GLOBULIN UR ELPH-MCNC: 2.6 GM/DL
GLOBULIN UR ELPH-MCNC: 3.1 GM/DL
GLOBULIN UR ELPH-MCNC: 3.1 GM/DL
GLOBULIN UR ELPH-MCNC: 3.2 GM/DL
GLUCOSE BLDC GLUCOMTR-MCNC: 119 MG/DL (ref 70–105)
GLUCOSE BLDC GLUCOMTR-MCNC: 138 MG/DL (ref 70–105)
GLUCOSE BLDC GLUCOMTR-MCNC: 152 MG/DL (ref 70–105)
GLUCOSE SERPL-MCNC: 118 MG/DL (ref 65–99)
GLUCOSE SERPL-MCNC: 138 MG/DL (ref 65–99)
GLUCOSE SERPL-MCNC: 140 MG/DL (ref 65–99)
GLUCOSE SERPL-MCNC: 158 MG/DL (ref 65–99)
GLUCOSE SERPL-MCNC: 89 MG/DL (ref 65–99)
GLUCOSE UR STRIP-MCNC: NEGATIVE MG/DL
HCO3 BLDA-SCNC: 26 MMOL/L (ref 21–28)
HCT VFR BLD AUTO: 42.9 % (ref 37.5–51)
HCT VFR BLD AUTO: 43.7 % (ref 37.5–51)
HCT VFR BLD AUTO: 44.5 % (ref 37.5–51)
HCT VFR BLD AUTO: 45 % (ref 37.5–51)
HCT VFR BLD AUTO: 45.5 % (ref 37.5–51)
HCT VFR BLD AUTO: 49.5 % (ref 37.5–51)
HEMODILUTION: NO
HGB BLD-MCNC: 13.5 G/DL (ref 13–17.7)
HGB BLD-MCNC: 13.9 G/DL (ref 13–17.7)
HGB BLD-MCNC: 14 G/DL (ref 13–17.7)
HGB BLD-MCNC: 14.1 G/DL (ref 13–17.7)
HGB BLD-MCNC: 14.2 G/DL (ref 13–17.7)
HGB BLD-MCNC: 15.7 G/DL (ref 13–17.7)
HGB UR QL STRIP.AUTO: ABNORMAL
HOLD SPECIMEN: NORMAL
HOLD SPECIMEN: NORMAL
HYALINE CASTS UR QL AUTO: ABNORMAL /LPF
INHALED O2 CONCENTRATION: 60 %
INR PPP: 1.43 (ref 0.93–1.1)
KETONES UR QL STRIP: ABNORMAL
LEUKOCYTE ESTERASE UR QL STRIP.AUTO: ABNORMAL
LYMPHOCYTES # BLD AUTO: 0.1 10*3/MM3 (ref 0.7–3.1)
LYMPHOCYTES # BLD AUTO: 0.3 10*3/MM3 (ref 0.7–3.1)
LYMPHOCYTES # BLD AUTO: 0.4 10*3/MM3 (ref 0.7–3.1)
LYMPHOCYTES # BLD AUTO: 0.55 10*3/MM3 (ref 0.7–3.1)
LYMPHOCYTES # BLD AUTO: 0.71 10*3/MM3 (ref 0.7–3.1)
LYMPHOCYTES # BLD AUTO: 0.91 10*3/MM3 (ref 0.7–3.1)
LYMPHOCYTES NFR BLD AUTO: 1.6 % (ref 19.6–45.3)
LYMPHOCYTES NFR BLD AUTO: 13.9 % (ref 19.6–45.3)
LYMPHOCYTES NFR BLD AUTO: 13.9 % (ref 19.6–45.3)
LYMPHOCYTES NFR BLD AUTO: 2.9 % (ref 19.6–45.3)
LYMPHOCYTES NFR BLD AUTO: 4.3 % (ref 19.6–45.3)
LYMPHOCYTES NFR BLD AUTO: 6.8 % (ref 19.6–45.3)
MCH RBC QN AUTO: 28.1 PG (ref 26.6–33)
MCH RBC QN AUTO: 28.1 PG (ref 26.6–33)
MCH RBC QN AUTO: 28.5 PG (ref 26.6–33)
MCH RBC QN AUTO: 29 PG (ref 26.6–33)
MCH RBC QN AUTO: 29.1 PG (ref 26.6–33)
MCH RBC QN AUTO: 29.3 PG (ref 26.6–33)
MCHC RBC AUTO-ENTMCNC: 31 G/DL (ref 31.5–35.7)
MCHC RBC AUTO-ENTMCNC: 31.1 G/DL (ref 31.5–35.7)
MCHC RBC AUTO-ENTMCNC: 31.6 G/DL (ref 31.5–35.7)
MCHC RBC AUTO-ENTMCNC: 31.7 G/DL (ref 31.5–35.7)
MCHC RBC AUTO-ENTMCNC: 31.8 G/DL (ref 31.5–35.7)
MCHC RBC AUTO-ENTMCNC: 32 G/DL (ref 31.5–35.7)
MCV RBC AUTO: 88.5 FL (ref 79–97)
MCV RBC AUTO: 89.2 FL (ref 79–97)
MCV RBC AUTO: 89.3 FL (ref 79–97)
MCV RBC AUTO: 92 FL (ref 79–97)
MCV RBC AUTO: 93.4 FL (ref 79–97)
MCV RBC AUTO: 94 FL (ref 79–97)
MODALITY: ABNORMAL
MONOCYTES # BLD AUTO: 0.4 10*3/MM3 (ref 0.1–0.9)
MONOCYTES # BLD AUTO: 0.56 10*3/MM3 (ref 0.1–0.9)
MONOCYTES # BLD AUTO: 0.69 10*3/MM3 (ref 0.1–0.9)
MONOCYTES # BLD AUTO: 0.8 10*3/MM3 (ref 0.1–0.9)
MONOCYTES # BLD AUTO: 1.03 10*3/MM3 (ref 0.1–0.9)
MONOCYTES # BLD AUTO: 1.2 10*3/MM3 (ref 0.1–0.9)
MONOCYTES NFR BLD AUTO: 10.6 % (ref 5–12)
MONOCYTES NFR BLD AUTO: 10.9 % (ref 5–12)
MONOCYTES NFR BLD AUTO: 12.8 % (ref 5–12)
MONOCYTES NFR BLD AUTO: 13.7 % (ref 5–12)
MONOCYTES NFR BLD AUTO: 5.2 % (ref 5–12)
MONOCYTES NFR BLD AUTO: 9.2 % (ref 5–12)
NEUTROPHILS NFR BLD AUTO: 3.62 10*3/MM3 (ref 1.7–7)
NEUTROPHILS NFR BLD AUTO: 4.77 10*3/MM3 (ref 1.7–7)
NEUTROPHILS NFR BLD AUTO: 6.39 10*3/MM3 (ref 1.7–7)
NEUTROPHILS NFR BLD AUTO: 7.1 10*3/MM3 (ref 1.7–7)
NEUTROPHILS NFR BLD AUTO: 70.7 % (ref 42.7–76)
NEUTROPHILS NFR BLD AUTO: 73.1 % (ref 42.7–76)
NEUTROPHILS NFR BLD AUTO: 79.5 % (ref 42.7–76)
NEUTROPHILS NFR BLD AUTO: 8 10*3/MM3 (ref 1.7–7)
NEUTROPHILS NFR BLD AUTO: 8.1 10*3/MM3 (ref 1.7–7)
NEUTROPHILS NFR BLD AUTO: 81.9 % (ref 42.7–76)
NEUTROPHILS NFR BLD AUTO: 87.8 % (ref 42.7–76)
NEUTROPHILS NFR BLD AUTO: 93.1 % (ref 42.7–76)
NITRITE UR QL STRIP: NEGATIVE
NRBC BLD AUTO-RTO: 0 /100 WBC (ref 0–0.2)
NRBC BLD AUTO-RTO: 0.1 /100 WBC (ref 0–0.2)
NRBC BLD AUTO-RTO: 0.1 /100 WBC (ref 0–0.2)
NT-PROBNP SERPL-MCNC: ABNORMAL PG/ML (ref 0–1800)
PCO2 BLDA: 45.8 MM HG (ref 35–48)
PH BLDA: 7.36 PH UNITS (ref 7.35–7.45)
PH UR STRIP.AUTO: 6 [PH] (ref 5–8)
PLATELET # BLD AUTO: 142 10*3/MM3 (ref 140–450)
PLATELET # BLD AUTO: 163 10*3/MM3 (ref 140–450)
PLATELET # BLD AUTO: 165 10*3/MM3 (ref 140–450)
PLATELET # BLD AUTO: 176 10*3/MM3 (ref 140–450)
PLATELET # BLD AUTO: 179 10*3/MM3 (ref 140–450)
PLATELET # BLD AUTO: 98 10*3/MM3 (ref 140–450)
PMV BLD AUTO: 10 FL (ref 6–12)
PMV BLD AUTO: 10.1 FL (ref 6–12)
PMV BLD AUTO: 7.9 FL (ref 6–12)
PMV BLD AUTO: 7.9 FL (ref 6–12)
PMV BLD AUTO: 8.4 FL (ref 6–12)
PMV BLD AUTO: 9.7 FL (ref 6–12)
PO2 BLDA: 62.4 MM HG (ref 83–108)
POTASSIUM SERPL-SCNC: 4 MMOL/L (ref 3.5–5.2)
POTASSIUM SERPL-SCNC: 4.1 MMOL/L (ref 3.5–5.2)
POTASSIUM SERPL-SCNC: 4.2 MMOL/L (ref 3.5–5.2)
PROCALCITONIN SERPL-MCNC: 0.8 NG/ML (ref 0–0.25)
PROT SERPL-MCNC: 6 G/DL (ref 6–8.5)
PROT SERPL-MCNC: 6.3 G/DL (ref 6–8.5)
PROT SERPL-MCNC: 6.8 G/DL (ref 6–8.5)
PROT SERPL-MCNC: 7.1 G/DL (ref 6–8.5)
PROT UR QL STRIP: ABNORMAL
PROTHROMBIN TIME: 14.4 SECONDS (ref 9.6–11.7)
QT INTERVAL: 333 MS
RBC # BLD AUTO: 4.75 10*6/MM3 (ref 4.14–5.8)
RBC # BLD AUTO: 4.81 10*6/MM3 (ref 4.14–5.8)
RBC # BLD AUTO: 4.82 10*6/MM3 (ref 4.14–5.8)
RBC # BLD AUTO: 4.84 10*6/MM3 (ref 4.14–5.8)
RBC # BLD AUTO: 4.98 10*6/MM3 (ref 4.14–5.8)
RBC # BLD AUTO: 5.59 10*6/MM3 (ref 4.14–5.8)
RBC # UR STRIP: ABNORMAL /HPF
REF LAB TEST METHOD: ABNORMAL
SAO2 % BLDCOA: 90.5 % (ref 94–98)
SARS-COV-2 RNA RESP QL NAA+PROBE: NOT DETECTED
SODIUM SERPL-SCNC: 134 MMOL/L (ref 136–145)
SODIUM SERPL-SCNC: 136 MMOL/L (ref 136–145)
SODIUM SERPL-SCNC: 137 MMOL/L (ref 136–145)
SODIUM SERPL-SCNC: 138 MMOL/L (ref 136–145)
SODIUM SERPL-SCNC: 140 MMOL/L (ref 136–145)
SP GR UR STRIP: >=1.03 (ref 1–1.03)
SQUAMOUS #/AREA URNS HPF: ABNORMAL /HPF
T4 FREE SERPL-MCNC: 1.02 NG/DL (ref 0.93–1.7)
T4 FREE SERPL-MCNC: 1.12 NG/DL (ref 0.93–1.7)
TROPONIN T SERPL HS-MCNC: 45 NG/L
TSH SERPL DL<=0.05 MIU/L-ACNC: 1.28 UIU/ML (ref 0.27–4.2)
TSH SERPL DL<=0.05 MIU/L-ACNC: 2.88 UIU/ML (ref 0.27–4.2)
UROBILINOGEN UR QL STRIP: ABNORMAL
WBC # UR STRIP: ABNORMAL /HPF
WBC NRBC COR # BLD: 5.12 10*3/MM3 (ref 3.4–10.8)
WBC NRBC COR # BLD: 6.53 10*3/MM3 (ref 3.4–10.8)
WBC NRBC COR # BLD: 8.05 10*3/MM3 (ref 3.4–10.8)
WBC NRBC COR # BLD: 8.6 10*3/MM3 (ref 3.4–10.8)
WBC NRBC COR # BLD: 8.7 10*3/MM3 (ref 3.4–10.8)
WBC NRBC COR # BLD: 9.1 10*3/MM3 (ref 3.4–10.8)
WHOLE BLOOD HOLD COAG: NORMAL
WHOLE BLOOD HOLD SPECIMEN: NORMAL

## 2023-01-01 PROCEDURE — 36415 COLL VENOUS BLD VENIPUNCTURE: CPT

## 2023-01-01 PROCEDURE — 94799 UNLISTED PULMONARY SVC/PX: CPT

## 2023-01-01 PROCEDURE — 93005 ELECTROCARDIOGRAM TRACING: CPT

## 2023-01-01 PROCEDURE — 83880 ASSAY OF NATRIURETIC PEPTIDE: CPT | Performed by: EMERGENCY MEDICINE

## 2023-01-01 PROCEDURE — 82962 GLUCOSE BLOOD TEST: CPT

## 2023-01-01 PROCEDURE — 84439 ASSAY OF FREE THYROXINE: CPT | Performed by: NURSE PRACTITIONER

## 2023-01-01 PROCEDURE — 32551 INSERTION OF CHEST TUBE: CPT | Performed by: STUDENT IN AN ORGANIZED HEALTH CARE EDUCATION/TRAINING PROGRAM

## 2023-01-01 PROCEDURE — 25010000002 FENTANYL CITRATE (PF) 100 MCG/2ML SOLUTION: Performed by: NURSE ANESTHETIST, CERTIFIED REGISTERED

## 2023-01-01 PROCEDURE — 0 LIDOCAINE 1 % SOLUTION: Performed by: RADIOLOGY

## 2023-01-01 PROCEDURE — 25010000002 PEMBROLIZUMAB 100 MG/4ML SOLUTION 4 ML VIAL: Performed by: INTERNAL MEDICINE

## 2023-01-01 PROCEDURE — 99223 1ST HOSP IP/OBS HIGH 75: CPT | Performed by: STUDENT IN AN ORGANIZED HEALTH CARE EDUCATION/TRAINING PROGRAM

## 2023-01-01 PROCEDURE — 85025 COMPLETE CBC W/AUTO DIFF WBC: CPT | Performed by: INTERNAL MEDICINE

## 2023-01-01 PROCEDURE — 25010000002 CEFEPIME PER 500 MG: Performed by: INTERNAL MEDICINE

## 2023-01-01 PROCEDURE — 85025 COMPLETE CBC W/AUTO DIFF WBC: CPT | Performed by: STUDENT IN AN ORGANIZED HEALTH CARE EDUCATION/TRAINING PROGRAM

## 2023-01-01 PROCEDURE — 85025 COMPLETE CBC W/AUTO DIFF WBC: CPT | Performed by: EMERGENCY MEDICINE

## 2023-01-01 PROCEDURE — 84484 ASSAY OF TROPONIN QUANT: CPT | Performed by: EMERGENCY MEDICINE

## 2023-01-01 PROCEDURE — 80053 COMPREHEN METABOLIC PANEL: CPT | Performed by: EMERGENCY MEDICINE

## 2023-01-01 PROCEDURE — 99285 EMERGENCY DEPT VISIT HI MDM: CPT

## 2023-01-01 PROCEDURE — 99232 SBSQ HOSP IP/OBS MODERATE 35: CPT | Performed by: INTERNAL MEDICINE

## 2023-01-01 PROCEDURE — G0378 HOSPITAL OBSERVATION PER HR: HCPCS

## 2023-01-01 PROCEDURE — 84145 PROCALCITONIN (PCT): CPT

## 2023-01-01 PROCEDURE — 96375 TX/PRO/DX INJ NEW DRUG ADDON: CPT

## 2023-01-01 PROCEDURE — 71045 X-RAY EXAM CHEST 1 VIEW: CPT

## 2023-01-01 PROCEDURE — 0 LIDOCAINE 1 % SOLUTION: Performed by: STUDENT IN AN ORGANIZED HEALTH CARE EDUCATION/TRAINING PROGRAM

## 2023-01-01 PROCEDURE — 85025 COMPLETE CBC W/AUTO DIFF WBC: CPT

## 2023-01-01 PROCEDURE — 85610 PROTHROMBIN TIME: CPT | Performed by: NURSE PRACTITIONER

## 2023-01-01 PROCEDURE — 83605 ASSAY OF LACTIC ACID: CPT

## 2023-01-01 PROCEDURE — 99214 OFFICE O/P EST MOD 30 MIN: CPT | Performed by: INTERNAL MEDICINE

## 2023-01-01 PROCEDURE — 25010000002 PROPOFOL 10 MG/ML EMULSION: Performed by: NURSE ANESTHETIST, CERTIFIED REGISTERED

## 2023-01-01 PROCEDURE — 80053 COMPREHEN METABOLIC PANEL: CPT | Performed by: NURSE PRACTITIONER

## 2023-01-01 PROCEDURE — 94761 N-INVAS EAR/PLS OXIMETRY MLT: CPT

## 2023-01-01 PROCEDURE — 99223 1ST HOSP IP/OBS HIGH 75: CPT | Performed by: INTERNAL MEDICINE

## 2023-01-01 PROCEDURE — 96374 THER/PROPH/DIAG INJ IV PUSH: CPT

## 2023-01-01 PROCEDURE — 85025 COMPLETE CBC W/AUTO DIFF WBC: CPT | Performed by: NURSE PRACTITIONER

## 2023-01-01 PROCEDURE — 25010000002 METHYLPREDNISOLONE PER 40 MG

## 2023-01-01 PROCEDURE — 25010000002 MIDAZOLAM PER 1 MG: Performed by: NURSE ANESTHETIST, CERTIFIED REGISTERED

## 2023-01-01 PROCEDURE — 76942 ECHO GUIDE FOR BIOPSY: CPT

## 2023-01-01 PROCEDURE — 96413 CHEMO IV INFUSION 1 HR: CPT

## 2023-01-01 PROCEDURE — 25010000002 HYDROMORPHONE 1 MG/ML SOLUTION: Performed by: STUDENT IN AN ORGANIZED HEALTH CARE EDUCATION/TRAINING PROGRAM

## 2023-01-01 PROCEDURE — 97163 PT EVAL HIGH COMPLEX 45 MIN: CPT | Performed by: PHYSICAL THERAPIST

## 2023-01-01 PROCEDURE — C1729 CATH, DRAINAGE: HCPCS | Performed by: STUDENT IN AN ORGANIZED HEALTH CARE EDUCATION/TRAINING PROGRAM

## 2023-01-01 PROCEDURE — 93010 ELECTROCARDIOGRAM REPORT: CPT | Performed by: INTERNAL MEDICINE

## 2023-01-01 PROCEDURE — 25010000002 AMPICILLIN-SULBACTAM PER 1.5 G

## 2023-01-01 PROCEDURE — 84443 ASSAY THYROID STIM HORMONE: CPT | Performed by: NURSE PRACTITIONER

## 2023-01-01 PROCEDURE — 80053 COMPREHEN METABOLIC PANEL: CPT | Performed by: INTERNAL MEDICINE

## 2023-01-01 PROCEDURE — 25010000002 METHYLPREDNISOLONE PER 125 MG: Performed by: EMERGENCY MEDICINE

## 2023-01-01 PROCEDURE — 81001 URINALYSIS AUTO W/SCOPE: CPT | Performed by: EMERGENCY MEDICINE

## 2023-01-01 PROCEDURE — 87636 SARSCOV2 & INF A&B AMP PRB: CPT | Performed by: EMERGENCY MEDICINE

## 2023-01-01 PROCEDURE — 87040 BLOOD CULTURE FOR BACTERIA: CPT | Performed by: EMERGENCY MEDICINE

## 2023-01-01 PROCEDURE — 80048 BASIC METABOLIC PNL TOTAL CA: CPT

## 2023-01-01 PROCEDURE — 25010000002 METHYLPREDNISOLONE PER 40 MG: Performed by: INTERNAL MEDICINE

## 2023-01-01 PROCEDURE — 32601 THORACOSCOPY DIAGNOSTIC: CPT | Performed by: STUDENT IN AN ORGANIZED HEALTH CARE EDUCATION/TRAINING PROGRAM

## 2023-01-01 PROCEDURE — 84439 ASSAY OF FREE THYROXINE: CPT | Performed by: INTERNAL MEDICINE

## 2023-01-01 PROCEDURE — 36600 WITHDRAWAL OF ARTERIAL BLOOD: CPT

## 2023-01-01 PROCEDURE — 25010000002 FUROSEMIDE PER 20 MG: Performed by: INTERNAL MEDICINE

## 2023-01-01 PROCEDURE — 25010000002 CEFAZOLIN PER 500 MG: Performed by: NURSE PRACTITIONER

## 2023-01-01 PROCEDURE — 96376 TX/PRO/DX INJ SAME DRUG ADON: CPT

## 2023-01-01 PROCEDURE — 25010000002 DEXAMETHASONE PER 1 MG: Performed by: NURSE ANESTHETIST, CERTIFIED REGISTERED

## 2023-01-01 PROCEDURE — 94640 AIRWAY INHALATION TREATMENT: CPT

## 2023-01-01 PROCEDURE — 0W9930Z DRAINAGE OF RIGHT PLEURAL CAVITY WITH DRAINAGE DEVICE, PERCUTANEOUS APPROACH: ICD-10-PCS | Performed by: STUDENT IN AN ORGANIZED HEALTH CARE EDUCATION/TRAINING PROGRAM

## 2023-01-01 PROCEDURE — C1729 CATH, DRAINAGE: HCPCS

## 2023-01-01 PROCEDURE — 94664 DEMO&/EVAL PT USE INHALER: CPT

## 2023-01-01 PROCEDURE — 84443 ASSAY THYROID STIM HORMONE: CPT | Performed by: INTERNAL MEDICINE

## 2023-01-01 PROCEDURE — 82803 BLOOD GASES ANY COMBINATION: CPT

## 2023-01-01 PROCEDURE — 25010000002 CEFEPIME PER 500 MG: Performed by: EMERGENCY MEDICINE

## 2023-01-01 PROCEDURE — 25010000002 ONDANSETRON PER 1 MG: Performed by: STUDENT IN AN ORGANIZED HEALTH CARE EDUCATION/TRAINING PROGRAM

## 2023-01-01 PROCEDURE — 97166 OT EVAL MOD COMPLEX 45 MIN: CPT

## 2023-01-01 RX ORDER — SODIUM CHLORIDE 9 MG/ML
250 INJECTION, SOLUTION INTRAVENOUS ONCE
Status: CANCELLED | OUTPATIENT
Start: 2023-01-01

## 2023-01-01 RX ORDER — ONDANSETRON 4 MG/1
4 TABLET, FILM COATED ORAL EVERY 6 HOURS PRN
Status: DISCONTINUED | OUTPATIENT
Start: 2023-01-01 | End: 2023-01-01 | Stop reason: HOSPADM

## 2023-01-01 RX ORDER — SODIUM CHLORIDE 9 MG/ML
50 INJECTION, SOLUTION INTRAVENOUS CONTINUOUS
Status: DISCONTINUED | OUTPATIENT
Start: 2023-01-01 | End: 2023-01-01 | Stop reason: HOSPADM

## 2023-01-01 RX ORDER — MIDAZOLAM HYDROCHLORIDE 1 MG/ML
INJECTION INTRAMUSCULAR; INTRAVENOUS AS NEEDED
Status: DISCONTINUED | OUTPATIENT
Start: 2023-01-01 | End: 2023-01-01 | Stop reason: SURG

## 2023-01-01 RX ORDER — DEXAMETHASONE SODIUM PHOSPHATE 4 MG/ML
INJECTION, SOLUTION INTRA-ARTICULAR; INTRALESIONAL; INTRAMUSCULAR; INTRAVENOUS; SOFT TISSUE AS NEEDED
Status: DISCONTINUED | OUTPATIENT
Start: 2023-01-01 | End: 2023-01-01 | Stop reason: SURG

## 2023-01-01 RX ORDER — METHYLPREDNISOLONE SODIUM SUCCINATE 40 MG/ML
40 INJECTION, POWDER, LYOPHILIZED, FOR SOLUTION INTRAMUSCULAR; INTRAVENOUS EVERY 8 HOURS
Status: DISCONTINUED | OUTPATIENT
Start: 2023-01-01 | End: 2023-01-01

## 2023-01-01 RX ORDER — SODIUM CHLORIDE 0.9 % (FLUSH) 0.9 %
10 SYRINGE (ML) INJECTION AS NEEDED
Status: DISCONTINUED | OUTPATIENT
Start: 2023-01-01 | End: 2023-01-01 | Stop reason: HOSPADM

## 2023-01-01 RX ORDER — GUAIFENESIN 600 MG/1
1200 TABLET, EXTENDED RELEASE ORAL EVERY 12 HOURS SCHEDULED
Status: CANCELLED | OUTPATIENT
Start: 2023-01-01

## 2023-01-01 RX ORDER — TRAMADOL HYDROCHLORIDE 50 MG/1
50 TABLET ORAL EVERY 6 HOURS PRN
Qty: 120 TABLET | Refills: 0 | Status: SHIPPED | OUTPATIENT
Start: 2023-01-01 | End: 2023-01-01

## 2023-01-01 RX ORDER — PREDNISONE 10 MG/1
TABLET ORAL
Qty: 40 TABLET | Refills: 0 | Status: SHIPPED | OUTPATIENT
Start: 2023-01-01 | End: 2023-01-01 | Stop reason: SDUPTHER

## 2023-01-01 RX ORDER — LIDOCAINE HYDROCHLORIDE 10 MG/ML
INJECTION, SOLUTION INFILTRATION; PERINEURAL AS NEEDED
Status: DISCONTINUED | OUTPATIENT
Start: 2023-01-01 | End: 2023-01-01 | Stop reason: HOSPADM

## 2023-01-01 RX ORDER — FUROSEMIDE 20 MG/1
40 TABLET ORAL DAILY
Qty: 30 TABLET | Refills: 11 | Status: SHIPPED | OUTPATIENT
Start: 2023-01-01 | End: 2023-01-01

## 2023-01-01 RX ORDER — SODIUM CHLORIDE 9 MG/ML
75 INJECTION, SOLUTION INTRAVENOUS ONCE
Status: COMPLETED | OUTPATIENT
Start: 2023-01-01 | End: 2023-01-01

## 2023-01-01 RX ORDER — CHOLECALCIFEROL (VITAMIN D3) 125 MCG
5 CAPSULE ORAL NIGHTLY PRN
Status: DISCONTINUED | OUTPATIENT
Start: 2023-01-01 | End: 2023-01-01 | Stop reason: HOSPADM

## 2023-01-01 RX ORDER — IPRATROPIUM BROMIDE AND ALBUTEROL SULFATE 2.5; .5 MG/3ML; MG/3ML
3 SOLUTION RESPIRATORY (INHALATION)
Status: DISCONTINUED | OUTPATIENT
Start: 2023-01-01 | End: 2023-01-01 | Stop reason: HOSPADM

## 2023-01-01 RX ORDER — BENZONATATE 100 MG/1
200 CAPSULE ORAL 3 TIMES DAILY PRN
Status: DISCONTINUED | OUTPATIENT
Start: 2023-01-01 | End: 2023-01-01 | Stop reason: HOSPADM

## 2023-01-01 RX ORDER — SODIUM CHLORIDE 9 MG/ML
40 INJECTION, SOLUTION INTRAVENOUS AS NEEDED
Status: DISCONTINUED | OUTPATIENT
Start: 2023-01-01 | End: 2023-01-01 | Stop reason: HOSPADM

## 2023-01-01 RX ORDER — ALUMINA, MAGNESIA, AND SIMETHICONE 2400; 2400; 240 MG/30ML; MG/30ML; MG/30ML
15 SUSPENSION ORAL EVERY 6 HOURS PRN
Status: DISCONTINUED | OUTPATIENT
Start: 2023-01-01 | End: 2023-01-01 | Stop reason: HOSPADM

## 2023-01-01 RX ORDER — ACETAMINOPHEN 160 MG/5ML
650 SOLUTION ORAL EVERY 4 HOURS PRN
Status: DISCONTINUED | OUTPATIENT
Start: 2023-01-01 | End: 2023-01-01 | Stop reason: HOSPADM

## 2023-01-01 RX ORDER — SENNA PLUS 8.6 MG/1
2 TABLET ORAL 2 TIMES DAILY PRN
COMMUNITY
End: 2023-01-01

## 2023-01-01 RX ORDER — SODIUM CHLORIDE 0.9 % (FLUSH) 0.9 %
10 SYRINGE (ML) INJECTION EVERY 12 HOURS SCHEDULED
Status: DISCONTINUED | OUTPATIENT
Start: 2023-01-01 | End: 2023-01-01 | Stop reason: HOSPADM

## 2023-01-01 RX ORDER — FENTANYL CITRATE 50 UG/ML
25 INJECTION, SOLUTION INTRAMUSCULAR; INTRAVENOUS
Status: DISCONTINUED | OUTPATIENT
Start: 2023-01-01 | End: 2023-01-01 | Stop reason: HOSPADM

## 2023-01-01 RX ORDER — LORAZEPAM 2 MG/ML
1 INJECTION INTRAMUSCULAR EVERY 6 HOURS
Status: DISCONTINUED | OUTPATIENT
Start: 2023-01-01 | End: 2023-01-01

## 2023-01-01 RX ORDER — CARVEDILOL 3.12 MG/1
3.12 TABLET ORAL 2 TIMES DAILY WITH MEALS
Qty: 60 TABLET | Refills: 0 | Status: SHIPPED | OUTPATIENT
Start: 2023-01-01 | End: 2023-01-01 | Stop reason: SDUPTHER

## 2023-01-01 RX ORDER — SODIUM CHLORIDE 9 MG/ML
INJECTION, SOLUTION INTRAVENOUS CONTINUOUS PRN
Status: DISCONTINUED | OUTPATIENT
Start: 2023-01-01 | End: 2023-01-01 | Stop reason: SURG

## 2023-01-01 RX ORDER — LIDOCAINE HYDROCHLORIDE 20 MG/ML
INJECTION, SOLUTION EPIDURAL; INFILTRATION; INTRACAUDAL; PERINEURAL AS NEEDED
Status: DISCONTINUED | OUTPATIENT
Start: 2023-01-01 | End: 2023-01-01 | Stop reason: SURG

## 2023-01-01 RX ORDER — SODIUM CHLORIDE 9 MG/ML
250 INJECTION, SOLUTION INTRAVENOUS ONCE
Status: COMPLETED | OUTPATIENT
Start: 2023-01-01 | End: 2023-01-01

## 2023-01-01 RX ORDER — PREDNISONE 10 MG/1
TABLET ORAL
Qty: 40 TABLET | Refills: 0 | Status: SHIPPED | OUTPATIENT
Start: 2023-01-01 | End: 2023-01-01

## 2023-01-01 RX ORDER — IPRATROPIUM BROMIDE AND ALBUTEROL SULFATE 2.5; .5 MG/3ML; MG/3ML
3 SOLUTION RESPIRATORY (INHALATION) ONCE
Status: COMPLETED | OUTPATIENT
Start: 2023-01-01 | End: 2023-01-01

## 2023-01-01 RX ORDER — MORPHINE SULFATE 20 MG/ML
5 SOLUTION ORAL 4 TIMES DAILY PRN
COMMUNITY
End: 2023-01-01

## 2023-01-01 RX ORDER — ALBUTEROL SULFATE 2.5 MG/3ML
2.5 SOLUTION RESPIRATORY (INHALATION) ONCE AS NEEDED
Status: DISCONTINUED | OUTPATIENT
Start: 2023-01-01 | End: 2023-01-01 | Stop reason: HOSPADM

## 2023-01-01 RX ORDER — CARVEDILOL 3.12 MG/1
3.12 TABLET ORAL 2 TIMES DAILY WITH MEALS
Qty: 60 TABLET | Refills: 0 | Status: SHIPPED | OUTPATIENT
Start: 2023-01-01 | End: 2023-01-01

## 2023-01-01 RX ORDER — LOSARTAN POTASSIUM 25 MG/1
12.5 TABLET ORAL DAILY
Qty: 30 TABLET | Refills: 0 | Status: SHIPPED | OUTPATIENT
Start: 2023-01-01 | End: 2023-01-01

## 2023-01-01 RX ORDER — SODIUM CHLORIDE 9 MG/ML
50 INJECTION, SOLUTION INTRAVENOUS CONTINUOUS
Status: DISPENSED | OUTPATIENT
Start: 2023-01-01 | End: 2023-01-01

## 2023-01-01 RX ORDER — LOSARTAN POTASSIUM 25 MG/1
12.5 TABLET ORAL DAILY
Qty: 30 TABLET | Refills: 0 | Status: SHIPPED | OUTPATIENT
Start: 2023-01-01 | End: 2023-01-01 | Stop reason: SDUPTHER

## 2023-01-01 RX ORDER — FENTANYL CITRATE 50 UG/ML
INJECTION, SOLUTION INTRAMUSCULAR; INTRAVENOUS AS NEEDED
Status: DISCONTINUED | OUTPATIENT
Start: 2023-01-01 | End: 2023-01-01 | Stop reason: SURG

## 2023-01-01 RX ORDER — METHYLPREDNISOLONE SODIUM SUCCINATE 40 MG/ML
40 INJECTION, POWDER, LYOPHILIZED, FOR SOLUTION INTRAMUSCULAR; INTRAVENOUS EVERY 12 HOURS
Status: DISCONTINUED | OUTPATIENT
Start: 2023-01-01 | End: 2023-01-01 | Stop reason: HOSPADM

## 2023-01-01 RX ORDER — LORAZEPAM 2 MG/ML
1 INJECTION INTRAMUSCULAR EVERY 4 HOURS PRN
Status: DISCONTINUED | OUTPATIENT
Start: 2023-01-01 | End: 2023-01-01 | Stop reason: HOSPADM

## 2023-01-01 RX ORDER — PROPOFOL 10 MG/ML
VIAL (ML) INTRAVENOUS AS NEEDED
Status: DISCONTINUED | OUTPATIENT
Start: 2023-01-01 | End: 2023-01-01 | Stop reason: SURG

## 2023-01-01 RX ORDER — IPRATROPIUM BROMIDE AND ALBUTEROL SULFATE 2.5; .5 MG/3ML; MG/3ML
3 SOLUTION RESPIRATORY (INHALATION)
Status: DISCONTINUED | OUTPATIENT
Start: 2023-01-01 | End: 2023-01-01

## 2023-01-01 RX ORDER — KETAMINE HCL IN NACL, ISO-OSM 100MG/10ML
SYRINGE (ML) INJECTION AS NEEDED
Status: DISCONTINUED | OUTPATIENT
Start: 2023-01-01 | End: 2023-01-01 | Stop reason: SURG

## 2023-01-01 RX ORDER — FUROSEMIDE 20 MG/1
40 TABLET ORAL DAILY
Qty: 30 TABLET | Refills: 11 | Status: SHIPPED | OUTPATIENT
Start: 2023-01-01 | End: 2023-01-01 | Stop reason: SDUPTHER

## 2023-01-01 RX ORDER — ONDANSETRON 2 MG/ML
4 INJECTION INTRAMUSCULAR; INTRAVENOUS EVERY 6 HOURS PRN
Status: DISCONTINUED | OUTPATIENT
Start: 2023-01-01 | End: 2023-01-01 | Stop reason: HOSPADM

## 2023-01-01 RX ORDER — FUROSEMIDE 40 MG/1
40 TABLET ORAL DAILY
COMMUNITY
End: 2023-01-01

## 2023-01-01 RX ORDER — MORPHINE SULFATE 2 MG/ML
2 INJECTION, SOLUTION INTRAMUSCULAR; INTRAVENOUS
Status: DISCONTINUED | OUTPATIENT
Start: 2023-01-01 | End: 2023-01-01 | Stop reason: HOSPADM

## 2023-01-01 RX ORDER — TRAMADOL HYDROCHLORIDE 50 MG/1
50 TABLET ORAL EVERY 6 HOURS PRN
Status: DISCONTINUED | OUTPATIENT
Start: 2023-01-01 | End: 2023-01-01

## 2023-01-01 RX ORDER — ACETAMINOPHEN 650 MG/1
650 SUPPOSITORY RECTAL EVERY 4 HOURS PRN
Status: DISCONTINUED | OUTPATIENT
Start: 2023-01-01 | End: 2023-01-01 | Stop reason: HOSPADM

## 2023-01-01 RX ORDER — LIDOCAINE HYDROCHLORIDE 10 MG/ML
INJECTION, SOLUTION INFILTRATION; PERINEURAL AS NEEDED
Status: COMPLETED | OUTPATIENT
Start: 2023-01-01 | End: 2023-01-01

## 2023-01-01 RX ORDER — FUROSEMIDE 10 MG/ML
40 INJECTION INTRAMUSCULAR; INTRAVENOUS
Status: DISCONTINUED | OUTPATIENT
Start: 2023-01-01 | End: 2023-01-01

## 2023-01-01 RX ORDER — LORAZEPAM 2 MG/ML
1 INJECTION INTRAMUSCULAR
Status: DISCONTINUED | OUTPATIENT
Start: 2023-01-01 | End: 2023-01-01 | Stop reason: HOSPADM

## 2023-01-01 RX ORDER — ACETAMINOPHEN 325 MG/1
650 TABLET ORAL EVERY 4 HOURS PRN
Status: DISCONTINUED | OUTPATIENT
Start: 2023-01-01 | End: 2023-01-01 | Stop reason: HOSPADM

## 2023-01-01 RX ORDER — METHYLPREDNISOLONE SODIUM SUCCINATE 125 MG/2ML
80 INJECTION, POWDER, LYOPHILIZED, FOR SOLUTION INTRAMUSCULAR; INTRAVENOUS ONCE
Status: COMPLETED | OUTPATIENT
Start: 2023-01-01 | End: 2023-01-01

## 2023-01-01 RX ORDER — ONDANSETRON 2 MG/ML
4 INJECTION INTRAMUSCULAR; INTRAVENOUS ONCE AS NEEDED
Status: DISCONTINUED | OUTPATIENT
Start: 2023-01-01 | End: 2023-01-01 | Stop reason: HOSPADM

## 2023-01-01 RX ORDER — LORAZEPAM 0.5 MG/1
0.5 TABLET ORAL 4 TIMES DAILY PRN
COMMUNITY
End: 2023-01-01

## 2023-01-01 RX ORDER — IPRATROPIUM BROMIDE AND ALBUTEROL SULFATE 2.5; .5 MG/3ML; MG/3ML
3 SOLUTION RESPIRATORY (INHALATION) EVERY 4 HOURS PRN
Status: DISCONTINUED | OUTPATIENT
Start: 2023-01-01 | End: 2023-01-01

## 2023-01-01 RX ORDER — IPRATROPIUM BROMIDE AND ALBUTEROL SULFATE 2.5; .5 MG/3ML; MG/3ML
3 SOLUTION RESPIRATORY (INHALATION)
Qty: 360 ML | Refills: 0 | Status: SHIPPED | OUTPATIENT
Start: 2023-01-01 | End: 2023-01-01

## 2023-01-01 RX ORDER — IPRATROPIUM BROMIDE AND ALBUTEROL SULFATE 2.5; .5 MG/3ML; MG/3ML
3 SOLUTION RESPIRATORY (INHALATION)
Qty: 360 ML | Refills: 0 | Status: SHIPPED | OUTPATIENT
Start: 2023-01-01 | End: 2023-01-01 | Stop reason: SDUPTHER

## 2023-01-01 RX ORDER — GLYCOPYRROLATE 0.2 MG/ML
0.4 INJECTION INTRAMUSCULAR; INTRAVENOUS 4 TIMES DAILY
Status: DISCONTINUED | OUTPATIENT
Start: 2023-01-01 | End: 2023-01-01 | Stop reason: HOSPADM

## 2023-01-01 RX ORDER — ALBUTEROL SULFATE 90 UG/1
2 AEROSOL, METERED RESPIRATORY (INHALATION) ONCE
Status: COMPLETED | OUTPATIENT
Start: 2023-01-01 | End: 2023-01-01

## 2023-01-01 RX ADMIN — METHYLPREDNISOLONE SODIUM SUCCINATE 40 MG: 40 INJECTION, POWDER, FOR SOLUTION INTRAMUSCULAR; INTRAVENOUS at 09:47

## 2023-01-01 RX ADMIN — SODIUM CHLORIDE 200 MG: 9 INJECTION, SOLUTION INTRAVENOUS at 15:14

## 2023-01-01 RX ADMIN — HYDROMORPHONE HYDROCHLORIDE 0.5 MG: 1 INJECTION, SOLUTION INTRAMUSCULAR; INTRAVENOUS; SUBCUTANEOUS at 21:23

## 2023-01-01 RX ADMIN — Medication 10 MG: at 12:21

## 2023-01-01 RX ADMIN — Medication 10 ML: at 21:23

## 2023-01-01 RX ADMIN — METHYLPREDNISOLONE SODIUM SUCCINATE 40 MG: 40 INJECTION, POWDER, FOR SOLUTION INTRAMUSCULAR; INTRAVENOUS at 05:47

## 2023-01-01 RX ADMIN — LIDOCAINE HYDROCHLORIDE 50 MG: 20 INJECTION, SOLUTION EPIDURAL; INFILTRATION; INTRACAUDAL; PERINEURAL at 12:21

## 2023-01-01 RX ADMIN — CEFEPIME 2 G: 2 INJECTION, POWDER, FOR SOLUTION INTRAVENOUS at 06:07

## 2023-01-01 RX ADMIN — SODIUM CHLORIDE 250 ML: 900 INJECTION, SOLUTION INTRAVENOUS at 15:00

## 2023-01-01 RX ADMIN — SODIUM CHLORIDE 1770 ML: 9 INJECTION, SOLUTION INTRAVENOUS at 06:13

## 2023-01-01 RX ADMIN — METHYLPREDNISOLONE SODIUM SUCCINATE 40 MG: 40 INJECTION, POWDER, FOR SOLUTION INTRAMUSCULAR; INTRAVENOUS at 08:38

## 2023-01-01 RX ADMIN — IPRATROPIUM BROMIDE AND ALBUTEROL SULFATE 3 ML: 2.5; .5 SOLUTION RESPIRATORY (INHALATION) at 14:47

## 2023-01-01 RX ADMIN — METHYLPREDNISOLONE SODIUM SUCCINATE 40 MG: 40 INJECTION, POWDER, FOR SOLUTION INTRAMUSCULAR; INTRAVENOUS at 00:14

## 2023-01-01 RX ADMIN — Medication 10 ML: at 08:20

## 2023-01-01 RX ADMIN — DEXAMETHASONE SODIUM PHOSPHATE 8 MG: 4 INJECTION, SOLUTION INTRAMUSCULAR; INTRAVENOUS at 12:25

## 2023-01-01 RX ADMIN — SODIUM CHLORIDE 250 ML: 9 INJECTION, SOLUTION INTRAVENOUS at 14:36

## 2023-01-01 RX ADMIN — SODIUM CHLORIDE 200 MG: 9 INJECTION, SOLUTION INTRAVENOUS at 14:26

## 2023-01-01 RX ADMIN — PROPOFOL 20 MG: 10 INJECTION, EMULSION INTRAVENOUS at 12:21

## 2023-01-01 RX ADMIN — SODIUM CHLORIDE 75 ML/HR: 9 INJECTION, SOLUTION INTRAVENOUS at 00:00

## 2023-01-01 RX ADMIN — MIDAZOLAM 0.5 MG: 1 INJECTION INTRAMUSCULAR; INTRAVENOUS at 12:20

## 2023-01-01 RX ADMIN — IPRATROPIUM BROMIDE AND ALBUTEROL SULFATE 3 ML: 2.5; .5 SOLUTION RESPIRATORY (INHALATION) at 11:32

## 2023-01-01 RX ADMIN — Medication 10 ML: at 21:17

## 2023-01-01 RX ADMIN — SODIUM CHLORIDE 1.5 G: 900 INJECTION INTRAVENOUS at 14:55

## 2023-01-01 RX ADMIN — IPRATROPIUM BROMIDE AND ALBUTEROL SULFATE 3 ML: 2.5; .5 SOLUTION RESPIRATORY (INHALATION) at 12:25

## 2023-01-01 RX ADMIN — IPRATROPIUM BROMIDE AND ALBUTEROL SULFATE 3 ML: 2.5; .5 SOLUTION RESPIRATORY (INHALATION) at 20:13

## 2023-01-01 RX ADMIN — IPRATROPIUM BROMIDE AND ALBUTEROL SULFATE 3 ML: 2.5; .5 SOLUTION RESPIRATORY (INHALATION) at 19:40

## 2023-01-01 RX ADMIN — Medication 10 ML: at 09:47

## 2023-01-01 RX ADMIN — CEFAZOLIN 2 G: 2 INJECTION, POWDER, FOR SOLUTION INTRAMUSCULAR; INTRAVENOUS at 11:37

## 2023-01-01 RX ADMIN — HYDROMORPHONE HYDROCHLORIDE 0.5 MG: 1 INJECTION, SOLUTION INTRAMUSCULAR; INTRAVENOUS; SUBCUTANEOUS at 05:08

## 2023-01-01 RX ADMIN — METHYLPREDNISOLONE SODIUM SUCCINATE 40 MG: 40 INJECTION, POWDER, FOR SOLUTION INTRAMUSCULAR; INTRAVENOUS at 16:25

## 2023-01-01 RX ADMIN — IPRATROPIUM BROMIDE AND ALBUTEROL SULFATE 3 ML: 2.5; .5 SOLUTION RESPIRATORY (INHALATION) at 13:00

## 2023-01-01 RX ADMIN — METHYLPREDNISOLONE SODIUM SUCCINATE 80 MG: 125 INJECTION, POWDER, FOR SOLUTION INTRAMUSCULAR; INTRAVENOUS at 06:12

## 2023-01-01 RX ADMIN — SODIUM CHLORIDE 250 ML: 9 INJECTION, SOLUTION INTRAVENOUS at 14:26

## 2023-01-01 RX ADMIN — FENTANYL CITRATE 25 MCG: 50 INJECTION, SOLUTION INTRAMUSCULAR; INTRAVENOUS at 12:20

## 2023-01-01 RX ADMIN — ONDANSETRON 4 MG: 2 INJECTION INTRAMUSCULAR; INTRAVENOUS at 21:23

## 2023-01-01 RX ADMIN — SODIUM CHLORIDE 1.5 G: 900 INJECTION INTRAVENOUS at 02:19

## 2023-01-01 RX ADMIN — SODIUM CHLORIDE: 0.9 INJECTION, SOLUTION INTRAVENOUS at 12:07

## 2023-01-01 RX ADMIN — Medication 10 ML: at 20:34

## 2023-01-01 RX ADMIN — Medication 10 ML: at 20:38

## 2023-01-01 RX ADMIN — METHYLPREDNISOLONE SODIUM SUCCINATE 40 MG: 40 INJECTION, POWDER, FOR SOLUTION INTRAMUSCULAR; INTRAVENOUS at 09:35

## 2023-01-01 RX ADMIN — Medication 10 ML: at 09:35

## 2023-01-01 RX ADMIN — FUROSEMIDE 40 MG: 10 INJECTION, SOLUTION INTRAMUSCULAR; INTRAVENOUS at 11:37

## 2023-01-01 RX ADMIN — Medication 10 ML: at 01:09

## 2023-01-01 RX ADMIN — SODIUM CHLORIDE 1.5 G: 900 INJECTION INTRAVENOUS at 13:29

## 2023-01-01 RX ADMIN — IPRATROPIUM BROMIDE AND ALBUTEROL SULFATE 3 ML: .5; 3 SOLUTION RESPIRATORY (INHALATION) at 07:24

## 2023-01-01 RX ADMIN — HYDROMORPHONE HYDROCHLORIDE 0.5 MG: 1 INJECTION, SOLUTION INTRAMUSCULAR; INTRAVENOUS; SUBCUTANEOUS at 01:09

## 2023-01-01 RX ADMIN — TRAMADOL HYDROCHLORIDE 50 MG: 50 TABLET, COATED ORAL at 09:35

## 2023-01-01 RX ADMIN — SODIUM CHLORIDE 1.5 G: 900 INJECTION INTRAVENOUS at 09:47

## 2023-01-01 RX ADMIN — GLYCOPYRROLATE 0.4 MG: 0.2 INJECTION INTRAMUSCULAR; INTRAVENOUS at 21:22

## 2023-01-01 RX ADMIN — IPRATROPIUM BROMIDE AND ALBUTEROL SULFATE 3 ML: 2.5; .5 SOLUTION RESPIRATORY (INHALATION) at 07:41

## 2023-01-01 RX ADMIN — METHYLPREDNISOLONE SODIUM SUCCINATE 40 MG: 40 INJECTION, POWDER, FOR SOLUTION INTRAMUSCULAR; INTRAVENOUS at 16:27

## 2023-01-01 RX ADMIN — SODIUM CHLORIDE 1.5 G: 900 INJECTION INTRAVENOUS at 19:14

## 2023-01-01 RX ADMIN — IPRATROPIUM BROMIDE AND ALBUTEROL SULFATE 3 ML: 2.5; .5 SOLUTION RESPIRATORY (INHALATION) at 07:25

## 2023-01-01 RX ADMIN — LIDOCAINE HYDROCHLORIDE 3 ML: 10 INJECTION, SOLUTION INFILTRATION; PERINEURAL at 12:43

## 2023-01-01 RX ADMIN — CEFEPIME 2 G: 2 INJECTION, POWDER, FOR SOLUTION INTRAVENOUS at 19:09

## 2023-01-01 RX ADMIN — ALBUTEROL SULFATE 2 PUFF: 108 INHALANT RESPIRATORY (INHALATION) at 05:37

## 2023-01-01 RX ADMIN — PROPOFOL 35 MCG/KG/MIN: 10 INJECTION, EMULSION INTRAVENOUS at 12:22

## 2023-01-01 RX ADMIN — SODIUM CHLORIDE 200 MG: 9 INJECTION, SOLUTION INTRAVENOUS at 14:36

## 2023-01-01 RX ADMIN — METHYLPREDNISOLONE SODIUM SUCCINATE 40 MG: 40 INJECTION, POWDER, FOR SOLUTION INTRAMUSCULAR; INTRAVENOUS at 02:19

## 2023-01-01 RX ADMIN — CEFEPIME 2 G: 2 INJECTION, POWDER, FOR SOLUTION INTRAVENOUS at 06:48

## 2023-01-01 RX ADMIN — Medication 10 ML: at 09:00

## 2023-01-19 NOTE — PROGRESS NOTES
HEMATOLOGY ONCOLOGY OUTPATIENT FOLLOW UP       Patient name: Deric Wylie  : 1933  MRN: [id number]  Primary Care Physician: Bernardo Singh MD  Referring Physician: Bernardo Singh,*  Reason For Consult: Non-small cell lung cancer    History of Present Illness:    Patient is a 89 y.o.  male who presented to the hospital with shortness of breath CT findings concerning for right upper lobe mass encasing SVC, pulmonary artery.  Patient had a CT-guided biopsy on 2022 consistent with invasive moderately differentiated squamous cell carcinoma.  Patient also had a chest tube placement on the right side fluid sent for cytology consistent with malignant effusion. This was eventually removed.     9/15/2022 -MRI brain with and without contrast with no evidence of metastatic disease    2022 -completed palliative radiation to lung mass.    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  Tumor mutational burden is high at 11.7  PD-L1 TPS 1% positive    Patient was eventually discharged    I had a discussion with patient about treatment options.  He did not want chemotherapy.  We started him on immunotherapy only with pembrolizumab.    10/12/2022 -cycle 1 pembrolizumab  2022 - cycle 2  2022 - cycle 3  2022 -cycle 4    Subjective:  Patient denies any rash, diarrhea, joint pains. He has increasing back pain.    Past Medical History:   Diagnosis Date   • COPD (chronic obstructive pulmonary disease) (HCC)        Past Surgical History:   Procedure Laterality Date   • ABDOMINAL AORTIC ANEURYSM REPAIR     • APPENDECTOMY           Current Outpatient Medications:   •  aspirin 81 MG chewable tablet, Chew 81 mg Daily., Disp: , Rfl:   •  ondansetron (Zofran) 8 MG tablet, Take 1 tablet by mouth 3 (Three) Times a Day As Needed for Nausea or Vomiting., Disp: 30 tablet, Rfl: 5  •  polyethylene glycol (MIRALAX) 17 g packet, Take 17 g by  "mouth Daily., Disp: 24 each, Rfl: 1  No current facility-administered medications for this visit.    Facility-Administered Medications Ordered in Other Visits:   •  Pembrolizumab (KEYTRUDA) 200 mg in sodium chloride 0.9 % 108 mL chemo IVPB, 200 mg, Intravenous, Once, Veronica Delgado MD, Last Rate: 220 mL/hr at 01/25/23 1426, 200 mg at 01/25/23 1426    No Known Allergies    Family History   Problem Relation Age of Onset   • Heart disease Father    • Heart disease Mother    • Cancer Brother        Cancer-related family history includes Cancer in his brother.    Social History     Tobacco Use   • Smoking status: Former   • Smokeless tobacco: Never   Substance Use Topics   • Alcohol use: Never   • Drug use: Never     Social History     Social History Narrative   • Not on file      Objective:  Vital signs:  Vitals:    01/25/23 1409   BP: 105/64   Pulse: 95   Resp: 20   Temp: 97.8 °F (36.6 °C)   SpO2: 93%   Weight: 64 kg (141 lb)   Height: 180.3 cm (71\")   PainSc: 0-No pain     Body mass index is 19.67 kg/m².  ECOG  (2) Ambulatory and capable of self care, unable to carry out work activity, up and about > 50% or waking hours    Physical Exam:   Physical Exam  Constitutional:       Appearance: Normal appearance.   HENT:      Head: Normocephalic and atraumatic.      Mouth/Throat:      Mouth: Mucous membranes are moist.   Eyes:      Extraocular Movements: Extraocular movements intact.      Pupils: Pupils are equal, round, and reactive to light.   Cardiovascular:      Rate and Rhythm: Normal rate and regular rhythm.      Pulses: Normal pulses.      Heart sounds: Normal heart sounds. No murmur heard.  Pulmonary:      Effort: Pulmonary effort is normal.      Breath sounds: Normal breath sounds.   Abdominal:      General: There is no distension.      Palpations: Abdomen is soft. There is no mass.      Tenderness: There is no abdominal tenderness.   Musculoskeletal:         General: Normal range of motion.      Cervical back: " Normal range of motion.   Skin:     General: Skin is warm.   Neurological:      General: No focal deficit present.      Mental Status: He is alert.   Psychiatric:         Mood and Affect: Mood normal.         Lab Results - Last 18 Months   Lab Units 01/25/23  1354 01/04/23  1348 12/14/22  1334   WBC 10*3/mm3 6.53 5.12 5.41   HEMOGLOBIN g/dL 14.1 13.9 14.1   HEMATOCRIT % 45.5 43.7 44.4   PLATELETS 10*3/mm3 176 142 135*   MCV fL 94.0 92.0 90.4     Lab Results - Last 18 Months   Lab Units 01/04/23  1348 12/14/22  1334 11/30/22  1403   SODIUM mmol/L 140 139 139   POTASSIUM mmol/L 4.0 3.9 3.9   CHLORIDE mmol/L 102 97* 98   CO2 mmol/L 29.0 30.0* 31.0*   BUN mg/dL 19 14 11   CREATININE mg/dL 0.75* 0.61* 0.63*   CALCIUM mg/dL 10.9* 11.1* 11.0*   BILIRUBIN mg/dL 0.9 0.9 1.4*   ALK PHOS U/L 106 100 102   ALT (SGPT) U/L 10 9 9   AST (SGOT) U/L 16 17 16   GLUCOSE mg/dL 158* 168* 169*       Lab Results   Component Value Date    GLUCOSE 158 (H) 01/04/2023    BUN 19 01/04/2023    CREATININE 0.75 (L) 01/04/2023    EGFRIFNONA 76 09/29/2021    BCR 25.3 (H) 01/04/2023    K 4.0 01/04/2023    CO2 29.0 01/04/2023    CALCIUM 10.9 (H) 01/04/2023    ALBUMIN 3.7 01/04/2023    AST 16 01/04/2023    ALT 10 01/04/2023       Lab Results - Last 18 Months   Lab Units 09/14/22  0743   INR  1.05       No results found for: IRON, TIBC, FERRITIN    No results found for: FOLATE    No results found for: OCCULTBLD    No results found for: RETICCTPCT  No results found for: [id number]  No results found for: SPEP, UPEP  No results found for: LDH, URICACID  No results found for: SULEIMAN, RF, SEDRATE  No results found for: FIBRINOGEN, HAPTOGLOBIN  Lab Results   Component Value Date    INR 1.05 09/14/2022     No results found for:   No results found for: CEA  No components found for: CA-19-9  Lab Results   Component Value Date    PSA 2.750 09/29/2021     No results found for: SEDRATE    Assessment & Plan     Patient is a 89 yr old male with metastatic non  small cell lung cancer on treatment with Pembrolizumab.     Non-small cell lung cancer  Patient has stage IV disease with pleural effusion  Complete staging we obtained CT abdomen, MRI brain, CT abdomen showing left adrenal nodule, infrarenal abdominal aortic aneurysm 5.8 cm.  MRI brain was negative  I have discussed case with radiation oncology with significant shortness of breath, potential for worsening as we pursue molecular testing and the fact this is not a non-small cell lung cancer.  We have completed palliative radiation treatment to start with this will help with his shortness of breath, pain associated with the tumor.   [employer] ration sequencing was sent.  Results are pending.  I discussed treatment options including chemotherapy, immunotherapy versus targeted treatment based on Nex[employer] ration sequencing.  Patient has declined any type of chemotherapy.  He is willing to pursue immunotherapy if that is an option.  Eventual PD-L1 with positive results, treatment mutation burden high  Patient started on pembrolizumab.    No immunotherapy related side effects.    Repeat imaging with improvement. Continue immunotherapy    Pleural effusion  Malignant  Improved with thoracentesis.    Constipation  miralax as needed which helps.    Pain  Start tramadol.    Infrarenal abdominal aortic aneurysm  Vascular surgery outpatient consultation.        Thank you very much for providing the opportunity to participate in this patient’s care. Please do not hesitate to call if there are any other questions.

## 2023-01-25 NOTE — PROGRESS NOTES
Pt here for C6D1 Keytruda and MD f/u.  Pt w/ complaints of pain which Dr. Delgado had instructed him to take Tylenol for and he states that it is not helping.  Notified Dr. Delgado who will be seeing pt for f/u.  PIV placed.  Blood drawn peripherally and sent to lab for processing.  Treatment given and pt tolerated.  PIV removed.  Pt given AVS w/ next appointment and v/u.  Pt discharged from clinic w/ family.

## 2023-02-13 NOTE — PROGRESS NOTES
HEMATOLOGY ONCOLOGY OUTPATIENT FOLLOW UP       Patient name: Deric Wylie  : 1933  MRN: [id number]  Primary Care Physician: Bernardo Singh MD  Referring Physician: Bernardo Singh,*  Reason For Consult: Non-small cell lung cancer    History of Present Illness:    Patient is a 89 y.o.  male who presented to the hospital with shortness of breath CT findings concerning for right upper lobe mass encasing SVC, pulmonary artery.  Patient had a CT-guided biopsy on 2022 consistent with invasive moderately differentiated squamous cell carcinoma.  Patient also had a chest tube placement on the right side fluid sent for cytology consistent with malignant effusion. This was eventually removed.     9/15/2022 -MRI brain with and without contrast with no evidence of metastatic disease    2022 -completed palliative radiation to lung mass.    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  Tumor mutational burden is high at 11.7  PD-L1 TPS 1% positive    Patient was eventually discharged    I had a discussion with patient about treatment options.  He did not want chemotherapy.  We started him on immunotherapy only with pembrolizumab.    10/12/2022 -cycle 1 pembrolizumab  2022 - cycle 2  2022 - cycle 3  2022 -cycle 4  12/15/2022 - CT chest with mild decrease in the size large right pleural effusion  US thoracentesis with 3 lit removal    Subjective:  Started to have increase shortness of breath more on exertion.    Past Medical History:   Diagnosis Date   • COPD (chronic obstructive pulmonary disease) (HCC)        Past Surgical History:   Procedure Laterality Date   • ABDOMINAL AORTIC ANEURYSM REPAIR     • APPENDECTOMY           Current Outpatient Medications:   •  aspirin 81 MG chewable tablet, Chew 81 mg Daily., Disp: , Rfl:   •  ondansetron (Zofran) 8 MG tablet, Take 1 tablet by mouth 3 (Three) Times a Day As Needed for  PHYSICAL THERAPY TREATMENT NOTE - INPATIENT     Room Number: 433/817-Q       Presenting Problem: Chest pain    Problem List  Principal Problem:    Chest pain of uncertain etiology  Active Problems:    Coronary artery disease involving native heart with oth "Nausea or Vomiting., Disp: 30 tablet, Rfl: 5  •  polyethylene glycol (MIRALAX) 17 g packet, Take 17 g by mouth Daily., Disp: 24 each, Rfl: 1  •  traMADol (ULTRAM) 50 MG tablet, Take 1 tablet by mouth Every 6 (Six) Hours As Needed for Moderate Pain., Disp: 120 tablet, Rfl: 0    No Known Allergies    Family History   Problem Relation Age of Onset   • Heart disease Father    • Heart disease Mother    • Cancer Brother        Cancer-related family history includes Cancer in his brother.    Social History     Tobacco Use   • Smoking status: Former   • Smokeless tobacco: Never   Substance Use Topics   • Alcohol use: Never   • Drug use: Never     Social History     Social History Narrative   • Not on file      Objective:  Vital signs:  Vitals:    02/15/23 1411   BP: 111/70   Pulse: 105   Resp: 18   Temp: 97.4 °F (36.3 °C)   SpO2: 92%   Weight: 62.1 kg (137 lb)   Height: 180.3 cm (71\")   PainSc: 0-No pain     Body mass index is 19.11 kg/m².  ECOG  (2) Ambulatory and capable of self care, unable to carry out work activity, up and about > 50% or waking hours    Physical Exam:   Physical Exam  Constitutional:       Appearance: Normal appearance.   HENT:      Head: Normocephalic and atraumatic.      Mouth/Throat:      Mouth: Mucous membranes are moist.   Eyes:      Extraocular Movements: Extraocular movements intact.      Pupils: Pupils are equal, round, and reactive to light.   Cardiovascular:      Rate and Rhythm: Normal rate and regular rhythm.      Pulses: Normal pulses.      Heart sounds: Normal heart sounds. No murmur heard.  Pulmonary:      Effort: Pulmonary effort is normal.      Comments: Decrease breath sounds right llung  Abdominal:      General: There is no distension.      Palpations: Abdomen is soft. There is no mass.      Tenderness: There is no abdominal tenderness.   Musculoskeletal:         General: Normal range of motion.      Cervical back: Normal range of motion.   Skin:     General: Skin is warm. " training    SUBJECTIVE  \"I want to get stronger, I had three falls the week before coming here. \"     OBJECTIVE  Precautions: Bed/chair alarm    WEIGHT BEARING RESTRICTION  Weight Bearing Restriction: None                PAIN ASSESSMENT   Ratin   Neurological:      General: No focal deficit present.      Mental Status: He is alert.   Psychiatric:         Mood and Affect: Mood normal.         Lab Results - Last 18 Months   Lab Units 02/15/23  1359 01/25/23  1354 01/04/23  1348   WBC 10*3/mm3 8.05 6.53 5.12   HEMOGLOBIN g/dL 14.0 14.1 13.9   HEMATOCRIT % 45.0 45.5 43.7   PLATELETS 10*3/mm3 179 176 142   MCV fL 93.4 94.0 92.0     Lab Results - Last 18 Months   Lab Units 01/25/23  1354 01/04/23  1348 12/14/22  1334   SODIUM mmol/L 136 140 139   POTASSIUM mmol/L 4.0 4.0 3.9   CHLORIDE mmol/L 97* 102 97*   CO2 mmol/L 30.0* 29.0 30.0*   BUN mg/dL 17 19 14   CREATININE mg/dL 0.70* 0.75* 0.61*   CALCIUM mg/dL 11.4* 10.9* 11.1*   BILIRUBIN mg/dL 0.7 0.9 0.9   ALK PHOS U/L 123* 106 100   ALT (SGPT) U/L 11 10 9   AST (SGOT) U/L 17 16 17   GLUCOSE mg/dL 138* 158* 168*       Lab Results   Component Value Date    GLUCOSE 138 (H) 01/25/2023    BUN 17 01/25/2023    CREATININE 0.70 (L) 01/25/2023    EGFRIFNONA 76 09/29/2021    BCR 24.3 01/25/2023    K 4.0 01/25/2023    CO2 30.0 (H) 01/25/2023    CALCIUM 11.4 (H) 01/25/2023    ALBUMIN 4.0 01/25/2023    AST 17 01/25/2023    ALT 11 01/25/2023       Lab Results - Last 18 Months   Lab Units 09/14/22  0743   INR  1.05       No results found for: IRON, TIBC, FERRITIN    No results found for: FOLATE    No results found for: OCCULTBLD    No results found for: RETICCTPCT  No results found for: TVNEWZCK10  No results found for: SPEP, UPEP  No results found for: LDH, URICACID  No results found for: SULEIMAN, RF, SEDRATE  No results found for: FIBRINOGEN, HAPTOGLOBIN  Lab Results   Component Value Date    INR 1.05 09/14/2022     No results found for:   No results found for: CEA  No components found for: CA-19-9  Lab Results   Component Value Date    PSA 2.750 09/29/2021     No results found for: SEDRATE    Assessment & Plan     Patient is a 89 yr old male with metastatic non small cell lung cancer on treatment with  of session/findings; All patient questions and concerns addressed; Alarm set    CURRENT GOALS   Goals to be met by: 4/9/21, goals updated 3/29/21  Patient Goal Patient's self-stated goal is: return to PLOF   Goal #1 Patient is able to demonstrate supine - si Pembrolizumab.     Non-small cell lung cancer  Patient has stage IV disease with pleural effusion  Complete staging we obtained CT abdomen, MRI brain, CT abdomen showing left adrenal nodule, infrarenal abdominal aortic aneurysm 5.8 cm.  MRI brain was negative  I have discussed case with radiation oncology with significant shortness of breath, potential for worsening as we pursue molecular testing and the fact this is not a non-small cell lung cancer.  We have completed palliative radiation treatment to start with this will help with his shortness of breath, pain associated with the tumor.   Omniseq NexGen ration sequencing was sent.  Results are pending.  I discussed treatment options including chemotherapy, immunotherapy versus targeted treatment based on NexGen ration sequencing.  Patient has declined any type of chemotherapy.  He is willing to pursue immunotherapy if that is an option.  Eventual PD-L1 with positive results, treatment mutation burden high  Patient started on pembrolizumab.    No immunotherapy related side effects.    Repeat imaging stable however now with increased shortness of breath, will order CT imaging again    Pleural effusion  Malignant  Improved with thoracentesis.  I think he is reaccumulating, will order US thoracentesis again.  Repeat CT chest after.     Constipation  miralax as needed which helps.    Pain  Start tramadol.    Infrarenal abdominal aortic aneurysm  Vascular surgery outpatient consultation.        Thank you very much for providing the opportunity to participate in this patient’s care. Please do not hesitate to call if there are any other questions.

## 2023-02-15 NOTE — PROGRESS NOTES
Patient came in for Keytruda with complaints of weakness and increased SOB on exertion. Dr. Delgado assessed and patient is okay to treat today. Treatment tolerated. Next apts given. Patient denies further needs at this time.

## 2023-02-22 NOTE — DISCHARGE INSTRUCTIONS
KEEP DRESSING ON FOR THE NEXT 48 HOURS. YOU MAY THEN REMOVE IT AND SHOWER AS USUAL. NO HEAVY LIFTING GREATER THAN 10 POUNDS FOR THE NEXT 24 HOURS. WATCH FOR SIGNS AND SYMPTOMS OF INFECTION SUCH AS REDNESS, SWELLING, DRAINAGE, OR UNEXPLAINED FEVER. IF ANY SYMPTOMS ARISE. SEE YOUR PHYSICIAN. OTHERWISE, FOLLOW UP WITH YOUR DOCTOR AS NEEDED/AS USUAL. ANY QUESTIONS, YOU CAN CALL THE INTERVENTIONAL RADIOLOGY DEPT [phone], M-F 8-4:30. IF AFTER HOURS, FOLLOW PROMPTS ON PHONE LINE. IF YOU GET HOME AND DEVELOP ANY INCREASED DIFFICULTY BREATHING, CHEST PAIN THAT CONTINUES TO WORSEN, OR YOU NOTICE YOUR HEART RACING, GO TO NEAREST ER FOR EVALUATION OF A COLLAPSED LUNG.

## 2023-03-07 PROBLEM — C34.91: Status: ACTIVE | Noted: 2023-01-01

## 2023-03-07 PROBLEM — J96.20 ACUTE-ON-CHRONIC RESPIRATORY FAILURE: Status: ACTIVE | Noted: 2023-01-01

## 2023-03-07 PROBLEM — J18.9 POSTOBSTRUCTIVE PNEUMONIA: Status: ACTIVE | Noted: 2023-01-01

## 2023-03-07 PROBLEM — A41.9 SEPSIS (HCC): Status: ACTIVE | Noted: 2023-01-01

## 2023-03-07 NOTE — CONSULTS
[facility]: [facility]         CONSULT NOTE    Patient Identification:  Deric Wylie  89 y.o.  male  2/25/1933  [id number]            Requesting physician: Attending physician    Reason for Consultation: Obstructive pneumonia      History of Present Illness:    Deric Wylie is a 89-year-old male who presents to [facility] ED on 3/7/2023 with reported subjective fever anterior loss that has progressed over the last 24 hours.  He does report pleuritic chest pain, complaining of generalized weakness.  He was recently diagnosed with stage IV lung carcinoma.  He has had a decline in oral intake including liquids for the past 3 days.  While in ED chest x-rays shows moderate right pleural effusion with probable atelectasis in right lung which could reflect tumor along with superior mediastinum and right.  Prominent interstitial changes in the left lung, questionable edema or inflammatory process, cannot rule out lymphangitic spread of tumor    Assessment:    Acute respiratory failure with hypoxia  Moderate recurrent malignant right pleural effusion  Obstructive pneumonia    Squamous cell carcinoma of lung, stage IV, right (HCC)   -Diagnosed 9/14/2022 with CT-guided biopsy  -Completed palliative radiation  -Currently receiving immunotherapy    2/22/2023 status post right thoracentesis, 3 L removed    9/14/2022 required chest catheter placement, removed 9/17/2022  -cytology positive with malignancy consistent with non-small cell carcinoma    Elevated proBNP, 24,661    Chronic obstructive pulmonary disease  Severe emphysema    Recommendations:    Due to recurrent pleural effusion requiring frequent thoracentesis, will consider pleurex catheter, consult thoracic surgery    Titrate oxygen, currently requiring 11 L high flow    Antibiotics cefepime, monitor mental status  Solu-Medrol 40 mg every 8 hours  Bronchodilator    Chest CT 3/7/2023            CXR s/p thoracentesis 2/22/2023      Review of  "Sytems:  Review of Systems   Respiratory: Positive for cough and shortness of breath. Negative for wheezing and stridor.    Cardiovascular: Positive for chest pain. Negative for palpitations and leg swelling.   Gastrointestinal: Negative for abdominal distention.       Past Medical History:  Past Medical History:   Diagnosis Date   • Cancer (HCC)     Right lung   • COPD (chronic obstructive pulmonary disease) (HCC)        Past Surgical History:  Past Surgical History:   Procedure Laterality Date   • ABDOMINAL AORTIC ANEURYSM REPAIR     • APPENDECTOMY          Home Meds:  (Not in a hospital admission)      Allergies:  No Known Allergies    Social History:   Social History     Socioeconomic History   • Marital status:    Tobacco Use   • Smoking status: Former   • Smokeless tobacco: Never   Substance and Sexual Activity   • Alcohol use: Never   • Drug use: Never   • Sexual activity: Defer       Family History:  Family History   Problem Relation Age of Onset   • Heart disease Father    • Heart disease Mother    • Cancer Brother        Physical Exam:  /79   Pulse 110   Temp 97.5 °F (36.4 °C) (Oral)   Resp 28   Ht 180.3 cm (71\")   Wt 59 kg (130 lb)   SpO2 90%   BMI 18.13 kg/m²  Body mass index is 18.13 kg/m². 90% 59 kg (130 lb)  Physical Exam  Cardiovascular:      Heart sounds: Murmur heard.     No gallop.   Pulmonary:      Effort: No respiratory distress.      Breath sounds: Rales present. No wheezing.         LABS:  Lab Results   Component Value Date    CALCIUM 10.6 (H) 03/07/2023    PHOS 2.7 09/20/2022     Results from last 7 days   Lab Units 03/07/23  0641   SODIUM mmol/L 137   POTASSIUM mmol/L 4.1   CHLORIDE mmol/L 98   CO2 mmol/L 24.0   BUN mg/dL 50*   CREATININE mg/dL 0.85   GLUCOSE mg/dL 89   CALCIUM mg/dL 10.6*   WBC 10*3/mm3 8.60   HEMOGLOBIN g/dL 13.5   PLATELETS 10*3/mm3 163   ALT (SGPT) U/L 32   AST (SGOT) U/L 82*   PROBNP pg/mL 24,661.0*     Lab Results   Component Value Date    TROPONINT " 45 (H) 03/07/2023     Results from last 7 days   Lab Units 03/07/23  0641   HSTROP T ng/L 45*         Results from last 7 days   Lab Units 03/07/23  0547   LACTATE mmol/L 5.5*         Results from last 7 days   Lab Units 03/07/23  0553   INFLUENZA B PCR  Not Detected   INFLUENZA A PCR  Not Detected             Lab Results   Component Value Date    TSH 2.880 02/15/2023     Estimated Creatinine Clearance: 48.2 mL/min (by C-G formula based on SCr of 0.85 mg/dL).         Imaging:  Imaging Results (Last 24 Hours)     Procedure Component Value Units Date/Time    XR Chest 1 View [[id number]] Collected: 03/07/23 0645     Updated: 03/07/23 0652    Narrative:      XR CHEST 1 VW    Date of Exam: 3/7/2023 6:32 AM EST    Indication: weakness, lung cancer.    Comparison: February 22, 2023, chest x-ray, CT chest December 15, 2022    Findings:  There is near complete opacification right hemithorax as noted on the prior exam. There is underlying pleural effusion/pleural thickening and probable underlying atelectasis. There is density along the superior mediastinum on the right which could relate   to underlying tumor. There are interstitial changes within the left lung more pronounced as compared to prior study. It does look like to some scarring in the left upper lobe. The heart looks like it is enlarged. There is vascular atherosclerotic   calcification in the area of the aortic knob. There are emphysematous changes. Small pulmonary nodule seen in the left lung on prior CT not well imaged on this exam.      Impression:      Impression:  1.No improvement in appearance of the right hemithorax with moderate right pleural effusion and probable atelectasis right lung in addition to what could reflect tumor along the superior mediastinum on the right.  2.More prominent interstitial changes within the left lung that may be due to edema or inflammatory infectious process or possibly lymphangitic spread of tumor.  3.Emphysematous  changes.  4.Cardiomegaly  5.Atherosclerotic vascular calcification.    Electronically Signed: Daneil Cano    3/7/2023 6:50 AM EST    Workstation ID: [id number]            Current Meds:   SCHEDULE  cefepime, 2 g, Intravenous, Q12H  methylPREDNISolone sodium succinate, 40 mg, Intravenous, Q8H  sodium chloride, 10 mL, Intravenous, Q12H      Infusions  sodium chloride, 50 mL/hr      PRNs  •  acetaminophen **OR** acetaminophen **OR** acetaminophen  •  aluminum-magnesium hydroxide-simethicone  •  benzonatate  •  ipratropium-albuterol  •  melatonin  •  ondansetron **OR** ondansetron  •  sodium chloride  •  sodium chloride        ANN Bentley  3/7/2023  08:57 EST      Much of this encounter note is an electronic transcription/translation of spoken language to printed text using Dragon Software.

## 2023-03-07 NOTE — PROGRESS NOTES
Patient Story (Not Part of Legal Medical Record)  Nursing report ED to floor  Deric Wylie  89 y.o.  male    HPI:   Chief Complaint   Patient presents with    Weakness - Generalized       Admitting doctor:   Jose Villasenor MD    Admitting diagnosis:   The primary encounter diagnosis was Squamous cell carcinoma of lung, stage IV, right (HCC). Diagnoses of Acute exacerbation of chronic obstructive pulmonary disease (COPD) (HCC), Postobstructive pneumonia, Dehydration, and Sepsis with acute organ dysfunction without septic shock, due to unspecified organism, unspecified type (HCC) were also pertinent to this visit.    Code status:   Current Code Status       Date Active Code Status Order ID Comments User Context       3/7/2023 0909 No CPR (Do Not Attempt to Resuscitate) [id number]  Taylor Baron, APRN ED        Question Answer    Code Status (Patient has no pulse and is not breathing) No CPR (Do Not Attempt to Resuscitate)    Medical Interventions (Patient has pulse or is breathing) Full Support    Level Of Support Discussed With Patient    Release to patient Routine Release                    Allergies:   Patient has no known allergies.    Isolation:  No active isolations     Fall Risk:  Fall Risk Assessment was completed, and patient is at high risk for falls.   Predictive Model Details         8 (Low) Factor Value    Calculated 3/7/2023 06:08 Age 89    Risk of Fall Model Musculoskeletal Assessment WDL     Imaging order in this encounter Present     Respiratory Rate 27     Skin Assessment WDL     Magnesium not on file     Financial Class Medicare     Diastolic BP 64     Drug Use No     Tobacco Use Quit     Kirill Scale not on file     Peripheral Vascular Assessment WDL     Cardiac Assessment X     Calcium not on file     Clinically Relevant Sex Not Female     Gastrointestinal Assessment WDL     Number of Distinct Medication Classes administered 1     Albumin not on file     Total Bilirubin not on file      Chloride not on file     Potassium not on file     Days after Admission 0.037     Creatinine not on file     ALT not on file         Weight:       03/07/23  0527   Weight: 59 kg (130 lb)       Intake and Output    Intake/Output Summary (Last 24 hours) at 3/7/2023 1005  Last data filed at 3/7/2023 1003  Gross per 24 hour   Intake 1770 ml   Output --   Net 1770 ml       Diet:   Dietary Orders (From admission, onward)       Start     Ordered    03/07/23 0849  Diet: Regular/House Diet; Texture: Regular Texture (IDDSI 7); Fluid Consistency: Thin (IDDSI 0)  Diet Effective Now        References:    Diet Order [place]   Question Answer Comment   Diets: Regular/House Diet    Texture: Regular Texture (IDDSI 7)    Fluid Consistency: Thin (IDDSI 0)        03/07/23 0856                     Most recent vitals:   Vitals:    03/07/23 0915 03/07/23 0925 03/07/23 0931 03/07/23 0932   BP: 135/83  116/83    Pulse: (!) 147 109 114 113   Resp:       Temp:       TempSrc:       SpO2: 100% 95% 93% 95%   Weight:       Height:           Active LDAs/IV Access:   Lines, Drains & Airways       Active LDAs       Name Placement date Placement time Site Days    Peripheral IV 03/07/23 0612 Anterior;Right Hand 03/07/23  0612  Hand  less than 1                    Skin Condition:   Skin Assessments (last day)       None             Labs (abnormal labs have a star):   Labs Reviewed   COMPREHENSIVE METABOLIC PANEL - Abnormal; Notable for the following components:       Result Value    BUN 50 (*)     Calcium 10.6 (*)     Albumin 2.9 (*)     AST (SGOT) 82 (*)     Total Bilirubin 1.4 (*)     BUN/Creatinine Ratio 58.8 (*)     All other components within normal limits    Narrative:     GFR Normal >60  Chronic Kidney Disease <60  Kidney Failure <15    The GFR formula is only valid for adults with stable renal function between ages 18 and 70.   SINGLE HSTROPONIN T - Abnormal; Notable for the following components:    HS Troponin T 45 (*)     All other  components within normal limits    Narrative:     High Sensitive Troponin T Reference Range:  <10.0 ng/L- Negative Female for AMI  <15.0 ng/L- Negative Male for AMI  >=10 - Abnormal Female indicating possible myocardial injury.  >=15 - Abnormal Male indicating possible myocardial injury.   Clinicians would have to utilize clinical acumen, EKG, Troponin, and serial changes to determine if it is an Acute Myocardial Infarction or myocardial injury due to an underlying chronic condition.        BNP (IN-HOUSE) - Abnormal; Notable for the following components:    proBNP 24,661.0 (*)     All other components within normal limits    Narrative:     Among patients with dyspnea, NT-proBNP is highly sensitive for the detection of acute congestive heart failure. In addition NT-proBNP of <300 pg/ml effectively rules out acute congestive heart failure with 99% negative predictive value.     CBC WITH AUTO DIFFERENTIAL - Abnormal; Notable for the following components:    Neutrophil % 81.9 (*)     Lymphocyte % 4.3 (*)     Monocyte % 13.7 (*)     Eosinophil % 0.0 (*)     Neutrophils, Absolute 7.10 (*)     Lymphocytes, Absolute 0.40 (*)     Monocytes, Absolute 1.20 (*)     All other components within normal limits   PROCALCITONIN - Abnormal; Notable for the following components:    Procalcitonin 0.80 (*)     All other components within normal limits    Narrative:     As a Marker for Sepsis (Non-Neonates):    1. <0.5 ng/mL represents a low risk of severe sepsis and/or septic shock.  2. >2 ng/mL represents a high risk of severe sepsis and/or septic shock.    As a Marker for Lower Respiratory Tract Infections that require antibiotic therapy:    PCT on Admission    Antibiotic Therapy       6-12 Hrs later    >0.5                Strongly Recommended  >0.25 - <0.5        Recommended   0.1 - 0.25          Discouraged              Remeasure/reassess PCT  <0.1                Strongly Discouraged     Remeasure/reassess PCT    As 28 day mortality  "risk marker: \"Change in Procalcitonin Result\" (>80% or <=80%) if Day 0 (or Day 1) and Day 4 values are available. Refer to http://[email or web address]-pct-calculator.com    Change in PCT <=80%  A decrease of PCT levels below or equal to 80% defines a positive change in PCT test result representing a higher risk for 28-day all-cause mortality of patients diagnosed with severe sepsis for septic shock.    Change in PCT >80%  A decrease of PCT levels of more than 80% defines a negative change in PCT result representing a lower risk for 28-day all-cause mortality of patients diagnosed with severe sepsis or septic shock.      BLOOD GAS, ARTERIAL - Abnormal; Notable for the following components:    pO2, Arterial 62.4 (*)     O2 Saturation, Arterial 90.5 (*)     All other components within normal limits   POC LACTATE - Abnormal; Notable for the following components:    Lactate 5.5 (*)     All other components within normal limits   COVID-19 AND FLU A/B, NP SWAB IN TRANSPORT MEDIA 8-12 HR TAT - Normal    Narrative:     Fact sheet for providers: https://www.fda.gov/media/731938/download    Fact sheet for patients: https://www.fda.gov/media/756484/download    Test performed by PCR.   BLOOD CULTURE   RESPIRATORY CULTURE   RAINBOW DRAW    Narrative:     The following orders were created for panel order [place] Draw.  Procedure                               Abnormality         Status                     ---------                               -----------         ------                     Green Top (Gel)[[id number]]                                  Final result               Lavender Top[[id number]]                                     Final result               Gold Top - SST[[id number]]                                   Final result               Light Blue Top[[id number]]                                   Final result                 Please view results for these tests on the individual orders.   BLOOD GAS, ARTERIAL   URINALYSIS W/ CULTURE " IF INDICATED   LACTIC ACID, REFLEX   POC LACTATE   CBC AND DIFFERENTIAL    Narrative:     The following orders were created for panel order CBC & Differential.  Procedure                               Abnormality         Status                     ---------                               -----------         ------                     CBC Auto Differential[[id number]]        Abnormal            Final result                 Please view results for these tests on the individual orders.   GREEN TOP   LAVENDER TOP   GOLD TOP - SST   LIGHT BLUE TOP       LOC: Person, Place, and Time    Telemetry:  Telemetry    Cardiac Monitoring Ordered: yes    EKG:   ECG 12 Lead Dyspnea   Preliminary Result   HEART RATE= 112  bpm   RR Interval= 536  ms   [place] Interval= 174  ms   P Horizontal Axis= 34  deg   P Front Axis= 52  deg   QRSD Interval= 104  ms   QT Interval= 333  ms   QRS Axis= -81  deg   T Wave Axis= 2  deg   - ABNORMAL ECG -   Sinus tachycardia   Multiform ventricular premature complexes   Left anterior fascicular block   Low voltage, extremity leads   Electronically Signed By:    Date and Time of Study: 2023-03-07 09:17:31          Medications Given in the ED:   Medications   cefepime 2 gm IVPB in 100 ml NS (MBP) (has no administration in time range)   methylPREDNISolone sodium succinate (SOLU-Medrol) injection 40 mg (has no administration in time range)   benzonatate (TESSALON) capsule 200 mg (has no administration in time range)   sodium chloride 0.9 % flush 10 mL (has no administration in time range)   sodium chloride 0.9 % flush 10 mL (has no administration in time range)   sodium chloride 0.9 % infusion 40 mL (has no administration in time range)   acetaminophen (TYLENOL) tablet 650 mg (has no administration in time range)     Or   acetaminophen (TYLENOL) 160 MG/5ML solution 650 mg (has no administration in time range)     Or   acetaminophen (TYLENOL) suppository 650 mg (has no administration in time range)    aluminum-magnesium hydroxide-simethicone (MAALOX MAX) 400-400-40 MG/5ML suspension 15 mL (has no administration in time range)   ondansetron (ZOFRAN) tablet 4 mg (has no administration in time range)     Or   ondansetron (ZOFRAN) injection 4 mg (has no administration in time range)   melatonin tablet 5 mg (has no administration in time range)   ipratropium-albuterol (DUO-NEB) nebulizer solution 3 mL (has no administration in time range)   sodium chloride 0.9 % infusion (has no administration in time range)   Pharmacy to Dose Cefepime (has no administration in time range)   albuterol sulfate HFA (PROVENTIL HFA;VENTOLIN HFA;PROAIR HFA) inhaler 2 puff (2 puffs Inhalation Given 3/7/23 0537)   methylPREDNISolone sodium succinate (SOLU-Medrol) injection 80 mg (80 mg Intravenous Given 3/7/23 0612)   sodium chloride 0.9 % bolus 1,770 mL (0 mL Intravenous Stopped 3/7/23 1003)   cefepime 2 gm IVPB in 100 ml NS (MBP) (0 g Intravenous Stopped 3/7/23 0727)   ipratropium-albuterol (DUO-NEB) nebulizer solution 3 mL (3 mL Nebulization Given 3/7/23 0724)       Imaging results:  XR Chest 1 View    Result Date: 3/7/2023  Impression: 1.No improvement in appearance of the right hemithorax with moderate right pleural effusion and probable atelectasis right lung in addition to what could reflect tumor along the superior mediastinum on the right. 2.More prominent interstitial changes within the left lung that may be due to edema or inflammatory infectious process or possibly lymphangitic spread of tumor. 3.Emphysematous changes. 4.Cardiomegaly 5.Atherosclerotic vascular calcification. Electronically Signed: Daniel Cano  3/7/2023 6:50 AM EST  Workstation ID: [id number]     Social issues:   Social History     Socioeconomic History    Marital status:    Tobacco Use    Smoking status: Former    Smokeless tobacco: Never   Substance and Sexual Activity    Alcohol use: Never    Drug use: Never    Sexual activity: Defer       NIH Stroke  Scale:  Interval: (not recorded)  1a. Level of Consciousness: (not recorded)  1b. LOC Questions: (not recorded)  1c. LOC Commands: (not recorded)  2. Best Gaze: (not recorded)  3. Visual: (not recorded)  4. Facial Palsy: (not recorded)  5a. Motor Arm, Left: (not recorded)  5b. Motor Arm, Right: (not recorded)  6a. Motor Leg, Left: (not recorded)  6b. Motor Leg, Right: (not recorded)  7. Limb Ataxia: (not recorded)  8. Sensory: (not recorded)  9. Best Language: (not recorded)  10. Dysarthria: (not recorded)  11. Extinction and Inattention (formerly Neglect): (not recorded)    Total (NIH Stroke Scale): (not recorded)     Additional notable assessment information:     Nursing report ED to floor:Linda Weinstein RN   03/07/23 10:05 EST         Other (Not Part of Legal Medical Record)         ED to Floor Handoff (Not Part of Legal Medical Record)

## 2023-03-07 NOTE — H&P
[facility] Medicine Services  History & Physical    Patient Name: Deric Wylie  : 1933  MRN: [id number]  Primary Care Physician:  Bernardo Singh MD  Date of admission: 3/7/2023  Date and Time of Service: 3/7/2023    Subjective      Chief Complaint: Generalized weakness    History of Present Illness: Deric Wylie is a 89 y.o. male with past medical history of stage IV lung cancer who presented to [facility] on 3/7/2023 complaining of poor oral intake including liquids for the last 3 days and feeling weak all over for the past week.  He reported to the ED provider a productive cough with green sputum with some blood flecks.  He complains of left pleuritic chest discomfort.  Family reported to the ED provider the patient was confused.  No family present at bedside during assessment.  Patient able to answer questions appropriately and provide information when asked.    In the ED, chest x-ray showed No improvement in appearance of the right hemithorax with moderate right pleural effusion and probable atelectasis right lung in addition to what could reflect tumor along the superior mediastinum on the right. More prominent interstitial changes within the left lung that may be due to edema or inflammatory infectious process or possibly lymphangitic spread of tumor. Emphysematous changes. Cardiomegaly. Atherosclerotic vascular calcification. All vitals stable on admission except heart rate 111, respirations 28, SPO2 89% on 11 L high flow nasal cannula. All labs unremarkable except HS troponin 45, proBNP 24,661, BUN 50, lactic 5.5.  Flu and COVID negative. Patient received albuterol sulfate inhaler, 2 g cefepime, 80 mg Solu-Medrol, 1770 mL normal saline bolus in ED. Hospitalist was contacted to admit patient for further care and management.       Review of Systems   Constitutional: Positive for malaise/fatigue.   Respiratory: Positive for cough and sputum production.     Gastrointestinal: Negative for nausea and vomiting.   Neurological: Positive for weakness. Negative for focal weakness.       Personal History     Past Medical History:   Diagnosis Date   • Cancer (HCC)     Right lung   • COPD (chronic obstructive pulmonary disease) (HCC)        Past Surgical History:   Procedure Laterality Date   • ABDOMINAL AORTIC ANEURYSM REPAIR     • APPENDECTOMY         Family History: family history includes Cancer in his brother; Heart disease in his father and mother. Otherwise pertinent FHx was reviewed and not pertinent to current issue.    Social History:  reports that he has quit smoking. He has never used smokeless tobacco. He reports that he does not drink alcohol and does not use drugs.    Home Medications:  Prior to Admission Medications     Prescriptions Last Dose Informant Patient Reported? Taking?    aspirin 81 MG chewable tablet   Yes No    Chew 81 mg Daily.    ondansetron (Zofran) 8 MG tablet   No No    Take 1 tablet by mouth 3 (Three) Times a Day As Needed for Nausea or Vomiting.    polyethylene glycol (MIRALAX) 17 g packet   No No    Take 17 g by mouth Daily.    traMADol (ULTRAM) 50 MG tablet   No No    Take 1 tablet by mouth Every 6 (Six) Hours As Needed for Moderate Pain.            Allergies:  No Known Allergies    Objective      Vitals:   Temp:  [97.5 °F (36.4 °C)] 97.5 °F (36.4 °C)  Heart Rate:  [] 134  Resp:  [18-33] 28  BP: (108-131)/(62-90) 131/83  Flow (L/min):  [6-11] 11    Physical Exam  Constitutional:       General: He is sleeping.      Appearance: He is not ill-appearing.   HENT:      Head: Normocephalic and atraumatic.      Mouth/Throat:      Mouth: Mucous membranes are dry.      Pharynx: Oropharynx is clear.   Eyes:      Extraocular Movements: Extraocular movements intact.      Pupils: Pupils are equal, round, and reactive to light.   Cardiovascular:      Rate and Rhythm: Regular rhythm. Tachycardia present.      Pulses: Normal pulses.      Heart  sounds: Normal heart sounds.   Pulmonary:      Effort: Respiratory distress present.      Breath sounds: Examination of the right-middle field reveals decreased breath sounds. Examination of the right-lower field reveals decreased breath sounds. Decreased breath sounds present.      Comments: Patient requiring high flow nasal cannula at 11 L/min  Abdominal:      General: Abdomen is flat. Bowel sounds are normal.      Palpations: Abdomen is soft.   Musculoskeletal:         General: Normal range of motion.      Cervical back: Normal range of motion and neck supple.   Skin:     General: Skin is warm and dry.      Capillary Refill: Capillary refill takes 2 to 3 seconds.   Neurological:      General: No focal deficit present.      Mental Status: He is oriented to person, place, and time.      Motor: Weakness present.   Psychiatric:         Thought Content: Thought content normal.         Judgment: Judgment normal.        Result Review    Result Review:  I have personally reviewed the results from the time of this admission to 3/7/2023 09:09 EST and agree with these findings:  [x]  Laboratory  [x]  Microbiology  [x]  Radiology  [x]  EKG/Telemetry   []  Cardiology/Vascular   []  Pathology  [x]  Old records  []  Other:  Most notable findings include:   In the ED, chest x-ray showed No improvement in appearance of the right hemithorax with moderate right pleural effusion and probable atelectasis right lung in addition to what could reflect tumor along the superior mediastinum on the right. More prominent interstitial changes within the left lung that may be due to edema or inflammatory infectious process or possibly lymphangitic spread of tumor. Emphysematous changes. Cardiomegaly. Atherosclerotic vascular calcification. All vitals stable on admission except heart rate 111, respirations 28, SPO2 89% on 11 L high flow nasal cannula. All labs unremarkable except HS troponin 45, proBNP 24,661, BUN 50, lactic 5.5.  Flu and COVID  negative. Patient received albuterol sulfate inhaler, 2 g cefepime, 80 mg Solu-Medrol, 1770 mL normal saline bolus in ED. Hospitalist was contacted to admit patient for further care and management.       Assessment & Plan        Active Hospital Problems:  Active Hospital Problems    Diagnosis    • **Squamous cell carcinoma of lung, stage IV, right (HCC)    • Sepsis (HCC)    • Postobstructive pneumonia    • Acute-on-chronic respiratory failure (HCC)    • Dehydration    • COPD with acute exacerbation (HCC)        Plan:     Sepsis with acute organ dysfunction without septic shock  Postobstructive pneumonia  - WBC 8.6  - Lactic Acid 5.5, reflux pending   - Blood Cultures, results pending   - Procalcitonin ordered  - UA ordered  - sputum culture   - Chest xray reviewed   - Cefepime given in ED, continue   - Requiring high flow nasal cannula 11 L/min  - IVF 1,770 given in ED      Acute exacerbation of chronic obstructive pulmonary disease  - Chest xray reviewed  - ABG ordered   - EKG ordered   - BNP 24,661.0  - 80 mg Solu-Medrol given in ED  - Solu-medrol 40 mg q8   - Duoneb prn  - tessalon prn   - Requiring high flow nasal cannula 11 L/min  - Pulmonology consulted >> considering Pleurx catheter thoracic surgery consulted  - Hold aspirin until cleared by thoracic surgery    Dehydration  - Creatinine 0.85, BUN 50, eGFR 82.5   - gentle hydration NS 50 mL/hr   - monitor cmp       Squamous cell carcinoma of lung stage IV, right  - completed palliative radiation to lung mass (09/23/22)  - Oncology Consulted  - Palliative care consult to discuss goals of treatment       DVT prophylaxis: Mechanical SCD    CODE STATUS:    Level Of Support Discussed With: Patient  Code Status (Patient has no pulse and is not breathing): No CPR (Do Not Attempt to Resuscitate)  Medical Interventions (Patient has pulse or is breathing): Full Support  Release to patient: Routine Release    Admission Status:  I believe this patient meets inpatient  status.    I discussed the patient's findings and my recommendations with patient.      Signature: Electronically signed by ANN Dominguez, 03/07/23, 09:09 EST.  [facility] Hospitalist Team

## 2023-03-07 NOTE — ED NOTES
Complaints of generalized weakness x 2 days. Loss of appetite. Pt from home. Family reports to EMS staff that he is confused, but EMS reports pt is A & O x 4. Pt is hard of hearing.

## 2023-03-07 NOTE — CONSULTS
Hematology/Oncology Inpatient Consultation    Patient name: Deric Wylie  : 1933  MRN: [id number]  Referring Provider: Taylor Baron APRN  Reason for Consultation: Stage IV Lung Cancer, Pneumonia    Chief complaint: Generalized Weakness    History of present illness:    Deric Wylie is a 89 y.o. male who presented to [facility] on 3/7/2023 with complaints of weakness, fever, poor oral intake along with productive cough of green mucus with tinge of blood and pleuritic left chest pain. In the ER, he was found to have right pleural effusion, and left lung inflammatory infectious process on CXR as well as lactate of 5.5 with elevated heart rate and respiratory rate along with need for 11 liters oxygen to maintain saturation of 89% and BNP 24,661. Orders for urinalysis, blood cultures, respiratory cultures sent. After cultures, the patient was given steroid, albuterol, fluids and started on IV antibiotics, then admitted for further management.    CXR 3/7/2023- moderate right pleural effusion and probable atelectasis right lung-could reflect tumor along the superior mediastinum on the right,, interstitial changes left lung may be edema or inflammatory infectious process or possibly lymphatic spread of tumor, emphysematous changes    23  Hematology/Oncology was consulted for established patient with stage IV Squamous carcinoma RUL diagnosed with CT guided biopsy 22, PDL 1% and TMB high. An MRI brain at this time was negative for brain metastasis and he subsequently completed palliative radiation 2022. The patient did not want chemotherapy and was started on Keytruda 10/12/22, and completed cycle #4 22 with subsequent CT chest 12/15/22 showing mild decrease in mass RUL, multiple pulmonary nodules throughout the left lung, and large right pleural effusion. He underwent US thoracentesis 22 for increasing SOB with 3 liters fluid obtained and again on 23 with 3  liters removed from right lung. The patient now presents with weakness, poor oral intake and elevated lactate and procalcitonin along with productive cough of green mucus.      He  has a past medical history of Cancer (HCC) and COPD (chronic obstructive pulmonary disease) (HCC).    PCP: Bernardo Singh MD    History:  Past Medical History:   Diagnosis Date   • Cancer (HCC)     Right lung   • COPD (chronic obstructive pulmonary disease) (HCC)    ,   Past Surgical History:   Procedure Laterality Date   • ABDOMINAL AORTIC ANEURYSM REPAIR     • APPENDECTOMY     ,   Family History   Problem Relation Age of Onset   • Heart disease Father    • Heart disease Mother    • Cancer Brother    ,   Social History     Tobacco Use   • Smoking status: Former   • Smokeless tobacco: Never   Substance Use Topics   • Alcohol use: Never   • Drug use: Never   , (Not in a hospital admission)  , Scheduled Meds:  cefepime, 2 g, Intravenous, Q12H  ipratropium-albuterol, 3 mL, Nebulization, 4x Daily - RT  methylPREDNISolone sodium succinate, 40 mg, Intravenous, Q8H  sodium chloride, 10 mL, Intravenous, Q12H    , Continuous Infusions:  Pharmacy to Dose Cefepime,   sodium chloride, 50 mL/hr, Last Rate: 50 mL/hr (03/07/23 1035)    , PRN Meds:  •  acetaminophen **OR** acetaminophen **OR** acetaminophen  •  aluminum-magnesium hydroxide-simethicone  •  benzonatate  •  melatonin  •  ondansetron **OR** ondansetron  •  Pharmacy to Dose Cefepime  •  sodium chloride  •  sodium chloride   Allergies:  Patient has no known allergies.    Subjective     ROS:  Review of Systems   Constitutional: Positive for fatigue. Negative for fever.   HENT: Negative for congestion and nosebleeds.    Eyes: Negative for pain.   Respiratory: Positive for cough and shortness of breath.    Cardiovascular: Negative for chest pain.   Gastrointestinal: Negative for abdominal pain, blood in stool, diarrhea, nausea and vomiting.   Endocrine: Negative for cold intolerance and  "heat intolerance.   Genitourinary: Negative for difficulty urinating.   Musculoskeletal: Negative for arthralgias.   Skin: Negative for rash.   Neurological: Negative for dizziness and headaches.   Hematological: Does not bruise/bleed easily.   Psychiatric/Behavioral: Negative for behavioral problems.        Objective   Vital Signs:   /80 (BP Location: Left arm)   Pulse 112   Temp 97.9 °F (36.6 °C) (Oral)   Resp 16   Ht 180.3 cm (71\")   Wt 59 kg (130 lb)   SpO2 (!) 89%   BMI 18.13 kg/m²     Physical Exam: (performed by MD)  Physical Exam  Constitutional:       Appearance: Normal appearance.      Comments: Frail   HENT:      Head: Normocephalic and atraumatic.   Eyes:      Pupils: Pupils are equal, round, and reactive to light.   Cardiovascular:      Rate and Rhythm: Normal rate and regular rhythm.      Pulses: Normal pulses.      Heart sounds: No murmur heard.  Pulmonary:      Effort: Pulmonary effort is normal.      Comments: Needing 10 l oxygen  Decreased breath sounds on the right  Abdominal:      General: There is no distension.      Palpations: Abdomen is soft. There is no mass.      Tenderness: There is no abdominal tenderness.   Musculoskeletal:         General: Normal range of motion.      Cervical back: Normal range of motion.   Skin:     General: Skin is warm.   Neurological:      General: No focal deficit present.      Mental Status: He is alert.   Psychiatric:         Mood and Affect: Mood normal.         Results Review:  Lab Results (last 48 hours)     Procedure Component Value Units Date/Time    STAT Lactic Acid, Reflex [[id number]]  (Abnormal) Collected: 03/07/23 1007    Specimen: Blood from Arm, Right Updated: 03/07/23 1040     Lactate 3.2 mmol/L     Procalcitonin [[id number]]  (Abnormal) Collected: 03/07/23 0641    Specimen: Blood from Arm, Right Updated: 03/07/23 0955     Procalcitonin 0.80 ng/mL     Narrative:      As a Marker for Sepsis (Non-Neonates):    1. <0.5 ng/mL represents a low " "risk of severe sepsis and/or septic shock.  2. >2 ng/mL represents a high risk of severe sepsis and/or septic shock.    As a Marker for Lower Respiratory Tract Infections that require antibiotic therapy:    PCT on Admission    Antibiotic Therapy       6-12 Hrs later    >0.5                Strongly Recommended  >0.25 - <0.5        Recommended   0.1 - 0.25          Discouraged              Remeasure/reassess PCT  <0.1                Strongly Discouraged     Remeasure/reassess PCT    As 28 day mortality risk marker: \"Change in Procalcitonin Result\" (>80% or <=80%) if Day 0 (or Day 1) and Day 4 values are available. Refer to [email or web address]    Change in PCT <=80%  A decrease of PCT levels below or equal to 80% defines a positive change in PCT test result representing a higher risk for 28-day all-cause mortality of patients diagnosed with severe sepsis for septic shock.    Change in PCT >80%  A decrease of PCT levels of more than 80% defines a negative change in PCT result representing a lower risk for 28-day all-cause mortality of patients diagnosed with severe sepsis or septic shock.       Blood Gas, Arterial - [[id number]]  (Abnormal) Collected: 03/07/23 0927    Specimen: Arterial Blood Updated: 03/07/23 0929     Site Right Radial     Hao's Test Positive     pH, Arterial 7.363 pH units      pCO2, Arterial 45.8 mm Hg      pO2, Arterial 62.4 mm Hg      HCO3, Arterial 26.0 mmol/L      Base Excess, Arterial 0.1 mmol/L      Comment: Serial Number: [id number]:  [id number]        O2 Saturation, Arterial 90.5 %      CO2 Content 27.4 mmol/L      Barometric Pressure for Blood Gas --     Comment: N/A        Modality HFNC     FIO2 60 %      Hemodilution No    [place] Draw [[id number]] Collected: 03/07/23 0553    Specimen: Blood Updated: 03/07/23 0746    Narrative:      The following orders were created for panel order [place] Draw.  Procedure                               Abnormality         Status             "         ---------                               -----------         ------                     Green Top (Gel)[[id number]]                                  Final result               Lavender Top[[id number]]                                     Final result               Gold Top - SST[[id number]]                                   Final result               Light Blue Top[[id number]]                                   Final result                 Please view results for these tests on the individual orders.    Green Top (Gel) [[id number]] Collected: 03/07/23 0641    Specimen: Blood from Arm, Right Updated: 03/07/23 0746     Extra Tube Hold for add-ons.     Comment: Auto resulted.       Lavender Top [[id number]] Collected: 03/07/23 0641    Specimen: Blood from Arm, Right Updated: 03/07/23 0746     Extra Tube hold for add-on     Comment: Auto resulted       Comprehensive Metabolic Panel [[id number]]  (Abnormal) Collected: 03/07/23 0641    Specimen: Blood from Arm, Right Updated: 03/07/23 0708     Glucose 89 mg/dL      BUN 50 mg/dL      Creatinine 0.85 mg/dL      Sodium 137 mmol/L      Potassium 4.1 mmol/L      Chloride 98 mmol/L      CO2 24.0 mmol/L      Calcium 10.6 mg/dL      Total Protein 6.0 g/dL      Albumin 2.9 g/dL      ALT (SGPT) 32 U/L      AST (SGOT) 82 U/L      Alkaline Phosphatase 89 U/L      Total Bilirubin 1.4 mg/dL      Globulin 3.1 gm/dL      A/G Ratio 0.9 g/dL      BUN/Creatinine Ratio 58.8     Anion Gap 15.0 mmol/L      eGFR 82.5 mL/min/1.73     Narrative:      GFR Normal >60  Chronic Kidney Disease <60  Kidney Failure <15    The GFR formula is only valid for adults with stable renal function between ages 18 and 70.    Single High Sensitivity Troponin T [[id number]]  (Abnormal) Collected: 03/07/23 0641    Specimen: Blood from Arm, Right Updated: 03/07/23 0708     HS Troponin T 45 ng/L     Narrative:      High Sensitive Troponin T Reference Range:  <10.0 ng/L- Negative Female for AMI  <15.0 ng/L- Negative Male  for AMI  >=10 - Abnormal Female indicating possible myocardial injury.  >=15 - Abnormal Male indicating possible myocardial injury.   Clinicians would have to utilize clinical acumen, EKG, Troponin, and serial changes to determine if it is an Acute Myocardial Infarction or myocardial injury due to an underlying chronic condition.         BNP [[id number]]  (Abnormal) Collected: 03/07/23 0641    Specimen: Blood from Arm, Right Updated: 03/07/23 0708     proBNP 24,661.0 pg/mL     Narrative:      Among patients with dyspnea, NT-proBNP is highly sensitive for the detection of acute congestive heart failure. In addition NT-proBNP of <300 pg/ml effectively rules out acute congestive heart failure with 99% negative predictive value.      Gold Top - SST [[id number]] Collected: 03/07/23 0553    Specimen: Blood Updated: 03/07/23 0700     Extra Tube Hold for add-ons.     Comment: Auto resulted.       Light Blue Top [[id number]] Collected: 03/07/23 0553    Specimen: Blood Updated: 03/07/23 0700     Extra Tube Hold for add-ons.     Comment: Auto resulted       CBC & Differential [[id number]]  (Abnormal) Collected: 03/07/23 0641    Specimen: Blood from Arm, Right Updated: 03/07/23 0651    Narrative:      The following orders were created for panel order CBC & Differential.  Procedure                               Abnormality         Status                     ---------                               -----------         ------                     CBC Auto Differential[[id number]]        Abnormal            Final result                 Please view results for these tests on the individual orders.    CBC Auto Differential [[id number]]  (Abnormal) Collected: 03/07/23 0641    Specimen: Blood from Arm, Right Updated: 03/07/23 0651     WBC 8.60 10*3/mm3      RBC 4.81 10*6/mm3      Hemoglobin 13.5 g/dL      Hematocrit 42.9 %      MCV 89.2 fL      MCH 28.1 pg      MCHC 31.6 g/dL      RDW 15.1 %      RDW-SD 47.3 fl      MPV 8.4 fL      Platelets  163 10*3/mm3      Neutrophil % 81.9 %      Lymphocyte % 4.3 %      Monocyte % 13.7 %      Eosinophil % 0.0 %      Basophil % 0.1 %      Neutrophils, Absolute 7.10 10*3/mm3      Lymphocytes, Absolute 0.40 10*3/mm3      Monocytes, Absolute 1.20 10*3/mm3      Eosinophils, Absolute 0.00 10*3/mm3      Basophils, Absolute 0.00 10*3/mm3      nRBC 0.0 /100 WBC     Blood Culture - Blood, Arm, Right [[id number]] Collected: 03/07/23 0641    Specimen: Blood from Arm, Right Updated: 03/07/23 0645    COVID-19 and FLU A/B PCR - Swab, Nasopharynx [[id number]]  (Normal) Collected: 03/07/23 0553    Specimen: Swab from Nasopharynx Updated: 03/07/23 0617     COVID19 Not Detected     Influenza A PCR Not Detected     Influenza B PCR Not Detected    Narrative:      Fact sheet for providers: https://www.fda.gov/media/948364/download    Fact sheet for patients: https://www.fda.gov/media/218249/download    Test performed by PCR.    POC Lactate [[id number]]  (Abnormal) Collected: 03/07/23 0547    Specimen: Blood Updated: 03/07/23 0548     Lactate 5.5 mmol/L      Comment: Serial Number: [id number]:  [id number]              Pending Results:     Imaging Reviewed:   XR Chest 1 View    Result Date: 3/7/2023  Impression: 1.No improvement in appearance of the right hemithorax with moderate right pleural effusion and probable atelectasis right lung in addition to what could reflect tumor along the superior mediastinum on the right. 2.More prominent interstitial changes within the left lung that may be due to edema or inflammatory infectious process or possibly lymphangitic spread of tumor. 3.Emphysematous changes. 4.Cardiomegaly 5.Atherosclerotic vascular calcification. Electronically Signed: Daniel Cano  3/7/2023 6:50 AM EST  Workstation ID: [id number]           Assessment & Plan   ASSESSMENT  An 89-year-old male with Hx squamous cell carcinoma right lung with recurrent right lung malignant effusion presents with left pleuritic chest pain, weakness  and poor oral intake with productive cough of green mucus diagnosed with sepsis, pneumonia on CXR as well as moderate right pleural effusion.  The patient recently underwent US thoracentesis 2/22/2023 with 3 L fluid removed from right lung.  Patient is currently on 11 L high flow oxygen and receiving steroid and IV antibiotics.  Pulmonology on board with recommendation for Pleurx catheter placement and thoracic surgery consult.  Oncology was consulted for established patient with right lung squamous cell carcinoma, stage IV and agrees with Pleurx catheter placement for recurrent moderate to large right pleural effusions, monitoring of CBC, continuation of IV antibiotics.    Squamous cell carcinoma right lung  CT-guided biopsy 9/14/2020-invasive moderately differentiated squamous cell carcinoma, PDL 1 1% positive, TMB high  Cytology right lung fluid consistent with malignant effusion  MRI brain 9/15/2022-negative for metastasis  Palliative radiation right lung completed 9/23/2022  Keytruda x4 cycles completed  US thoracentesis right lung- 3 L removed on 12/21/2022 and 2/22/2023    Obstructive pneumonia/acute respiratory failure with hypoxia/COPD  CXR shows moderate to recurrent malignant right pleural effusion  Pulmonology on board-consider Pleurx catheter  Consult to thoracic surgery  Oxygen 11 L high flow  Cefepime IV, Solu-Medrol IV     Sepsis  Lactate 3.2, procalcitonin 0.8  Blood, respiratory, and urine cultures sent      PLAN  1. Continue IV antibiotics  2. Monitor CBC  3. Await urine, respiratory, and blood cultures  4. Follow with pulmonology  5. Await thoracic surgery consult    Electronically signed by ANN Wilkerson, 03/07/23, 2:53 PM EST.    Patient seen and examined agree with assessment and plan  89-year-old male with metastatic non-small cell lung cancer on treatment with Keytruda patient admitted after fall, shortness of breath worsening hypoxia  Recurrent effusion CT surgery consult for  Pleurx catheter placement  We will order for CT chest post Pleurx catheter to look for other signs of disease progression.  I believe he is progressing with his recurrent effusions and would likely not be a candidate for chemotherapy.  I would consider adding Cyramza to Keytruda based on the lung maps study.  Patient is not improving potentially hospice and comfort care can be discussed    Thank you for this consult. We will be happy to follow along with you.       Consulting MD below provided more than 50% of the total visit time for this encounter    Electronically signed by Veronica Delgado MD, 03/08/23, 4:07 PM EST.

## 2023-03-07 NOTE — ED PROVIDER NOTES
Subjective   History of Present Illness  89-year-old male presents via EMS from home.  He has history of stage IV lung cancer has had poor oral intake including liquids for the last 3 days.  The patient has had subjective fever and chills within the last 24 hours.  There is no reports of vomiting or diarrhea.  The patient denies melena hematemesis hematochezia.  Reports he has had a cough productive of green sputum with some blood flecks.  He reports that he has had pleuritic chest discomfort and states that he feels weak all over he denies focal neurologic defects or lateralizing neurologic sign.  Family reported that the patient was confused and EMS reported that the patient seemed oriented to the        Review of Systems   Unable to perform ROS: Mental status change       Past Medical History:   Diagnosis Date   • Cancer (HCC)     Right lung   • COPD (chronic obstructive pulmonary disease) (HCC)      Reported history of stage IV lung cancer  No Known Allergies    Past Surgical History:   Procedure Laterality Date   • ABDOMINAL AORTIC ANEURYSM REPAIR     • APPENDECTOMY         Family History   Problem Relation Age of Onset   • Heart disease Father    • Heart disease Mother    • Cancer Brother        Social History     Socioeconomic History   • Marital status:    Tobacco Use   • Smoking status: Former   • Smokeless tobacco: Never   Substance and Sexual Activity   • Alcohol use: Never   • Drug use: Never   • Sexual activity: Defer     No reported safety issues      Objective   Physical Exam  Alert [place] Coma Scale 15 appears dry   HEENT: Pupils equal and reactive to light. Conjunctivae are not injected. Normal tympanic membranes. Oropharynx and nares are normal.   Neck: Supple. Midline trachea. No JVD. No goiter.   Chest: Right sided Rales are noted as well as extensive expiratory wheezes and rhonchi bilateral and equal breath sounds bilaterally, regular rate and rhythm without murmur or rub.   Abdomen:  Positive bowel sounds, nontender, nondistended. No rebound or peritoneal signs. No CVA tenderness.   Extremities no clubbing. cyanosis or edema. Motor sensory exam is normal. The full range of motion is intact   Skin: Warm and dry, no rashes or petechia.   Lymphatic: No regional lymphadenopathy. No calf pain, swelling or Homans sign    Procedures           ED Course      SEPTIC SHOCK FOCUSED EXAM ATTESTATION    I attest that I have reassessed tissue perfusion after the fluid bolus given.    Michelet Diaz MD  03/07/23  07:25 EST          Labs Reviewed   COMPREHENSIVE METABOLIC PANEL - Abnormal; Notable for the following components:       Result Value    BUN 50 (*)     Calcium 10.6 (*)     Albumin 2.9 (*)     AST (SGOT) 82 (*)     Total Bilirubin 1.4 (*)     BUN/Creatinine Ratio 58.8 (*)     All other components within normal limits    Narrative:     GFR Normal >60  Chronic Kidney Disease <60  Kidney Failure <15    The GFR formula is only valid for adults with stable renal function between ages 18 and 70.   SINGLE HSTROPONIN T - Abnormal; Notable for the following components:    HS Troponin T 45 (*)     All other components within normal limits    Narrative:     High Sensitive Troponin T Reference Range:  <10.0 ng/L- Negative Female for AMI  <15.0 ng/L- Negative Male for AMI  >=10 - Abnormal Female indicating possible myocardial injury.  >=15 - Abnormal Male indicating possible myocardial injury.   Clinicians would have to utilize clinical acumen, EKG, Troponin, and serial changes to determine if it is an Acute Myocardial Infarction or myocardial injury due to an underlying chronic condition.        BNP (IN-HOUSE) - Abnormal; Notable for the following components:    proBNP 24,661.0 (*)     All other components within normal limits    Narrative:     Among patients with dyspnea, NT-proBNP is highly sensitive for the detection of acute congestive heart failure. In addition NT-proBNP of <300 pg/ml effectively rules out  acute congestive heart failure with 99% negative predictive value.     CBC WITH AUTO DIFFERENTIAL - Abnormal; Notable for the following components:    Neutrophil % 81.9 (*)     Lymphocyte % 4.3 (*)     Monocyte % 13.7 (*)     Eosinophil % 0.0 (*)     Neutrophils, Absolute 7.10 (*)     Lymphocytes, Absolute 0.40 (*)     Monocytes, Absolute 1.20 (*)     All other components within normal limits   POC LACTATE - Abnormal; Notable for the following components:    Lactate 5.5 (*)     All other components within normal limits   COVID-19 AND FLU A/B, NP SWAB IN TRANSPORT MEDIA 8-12 HR TAT - Normal    Narrative:     Fact sheet for providers: https://www.fda.gov/media/173317/download    Fact sheet for patients: https://www.fda.gov/media/206234/download    Test performed by PCR.   BLOOD CULTURE   BLOOD CULTURE   URINALYSIS W/ CULTURE IF INDICATED   LACTIC ACID, REFLEX   RAINBOW DRAW    Narrative:     The following orders were created for panel order [place] Draw.  Procedure                               Abnormality         Status                     ---------                               -----------         ------                     Green Top (Gel)[[id number]]                                  In process                 Lavender Top[[id number]]                                     In process                 Gold Top - SST[[id number]]                                   Final result               Light Blue Top[[id number]]                                   Final result                 Please view results for these tests on the individual orders.   POC LACTATE   CBC AND DIFFERENTIAL    Narrative:     The following orders were created for panel order CBC & Differential.  Procedure                               Abnormality         Status                     ---------                               -----------         ------                     CBC Auto Differential[[id number]]        Abnormal            Final result                 Please  view results for these tests on the individual orders.   GOLD TOP - SST   LIGHT BLUE TOP   GREEN TOP   LAVENDER TOP     Medications   cefepime 2 gm IVPB in 100 ml NS (MBP) (has no administration in time range)   ipratropium-albuterol (DUO-NEB) nebulizer solution 3 mL (has no administration in time range)   albuterol sulfate HFA (PROVENTIL HFA;VENTOLIN HFA;PROAIR HFA) inhaler 2 puff (2 puffs Inhalation Given 3/7/23 0537)   methylPREDNISolone sodium succinate (SOLU-Medrol) injection 80 mg (80 mg Intravenous Given 3/7/23 0612)   sodium chloride 0.9 % bolus 1,770 mL (1,770 mL Intravenous New Bag 3/7/23 0613)   cefepime 2 gm IVPB in 100 ml NS (MBP) (2 g Intravenous New Bag 3/7/23 0648)     XR Chest 1 View    Result Date: 3/7/2023  Impression: 1.No improvement in appearance of the right hemithorax with moderate right pleural effusion and probable atelectasis right lung in addition to what could reflect tumor along the superior mediastinum on the right. 2.More prominent interstitial changes within the left lung that may be due to edema or inflammatory infectious process or possibly lymphangitic spread of tumor. 3.Emphysematous changes. 4.Cardiomegaly 5.Atherosclerotic vascular calcification. Electronically Signed: Daniel Cano  3/7/2023 6:50 AM EST  Workstation ID: [id number]                               Medical Decision Making  The patient had difficult IV access but sepsis fluid bolus resulted in the patient feeling much better.  The patient was started on IV antibiotics.  The patient was also given steroids.  The patient was agreeable to hospitalization.  The patient's family reported they are having difficulty caring for him at home secondary to the level of his illness in the last several day    Amount and/or Complexity of Data Reviewed  Independent Historian:      Details: Family  External Data Reviewed: labs, radiology, ECG and notes.  Labs: ordered. Decision-making details documented in ED Course.  Radiology: ordered  and independent interpretation performed. Decision-making details documented in ED Course.  Discussion of management or test interpretation with external provider(s): Case was discussed with the hospitalist service.  The patient was listed as a full code last at the hospital in September 2020    Risk  OTC drugs.  Prescription drug management.  Decision regarding hospitalization.    Risk Details: Patient has stage IV cancer, comorbid factors were considered during the patient's evaluation in the emergency department    Critical Care  Total time providing critical care: 35 minutes (Please note that for critical care time 35 minutes were spent with care and stabilization of this patient exclusive of any procedures performed)      Final diagnoses:   Squamous cell carcinoma of lung, stage IV, right (HCC)   Acute exacerbation of chronic obstructive pulmonary disease (COPD) (HCC)   Postobstructive pneumonia   Dehydration   Sepsis with acute organ dysfunction without septic shock, due to unspecified organism, unspecified type (HCC)       ED Disposition  ED Disposition     ED Disposition   Decision to Admit    Condition   --    Comment   Level of Care: Telemetry [5]   Diagnosis: Squamous cell carcinoma of lung, stage IV, right (HCC) [[id number]]   Admitting Physician: PHU DAWSON [[id number]]   Attending Physician: PHU DAWSON [[id number]]               No follow-up provider specified.       Medication List      No changes were made to your prescriptions during this visit.          Michelet Diaz MD  03/07/23 0701

## 2023-03-07 NOTE — ED NOTES
Bladder scanned patient at 0749, first scan reading 103mL, second scan reading 97mL, and third scan reading 78mL

## 2023-03-08 NOTE — CASE MANAGEMENT/SOCIAL WORK
Discharge Planning Assessment  [facility]     Patient Name: Deric Wylie  MRN: [id number]  Today's Date: 3/8/2023    Admit Date: 3/7/2023    Plan: D/C plan: Anticipate home. PT/OT eval pending during CM rounds. Pt refused SNF.   Discharge Needs Assessment     Row Name 03/08/23 1639       Living Environment    People in Home friend(s)    Name(s) of People in Home Adriel Bales    Current Living Arrangements home    Primary Care Provided by self    Provides Primary Care For no one, unable/limited ability to care for self    Family Caregiver if Needed friend(s)    Family Caregiver Names Friend Nii    Quality of Family Relationships unable to assess  No visitors present during CM assessment.    Able to Return to Prior Arrangements other (see comments)  PT/OT eval pending       Resource/Environmental Concerns    Resource/Environmental Concerns none    Transportation Concerns none       Transition Planning    Patient/Family Anticipates Transition to home    Patient/Family Anticipated Services at Transition none    Transportation Anticipated family or friend will provide       Discharge Needs Assessment    Readmission Within the Last 30 Days no previous admission in last 30 days    Equipment Currently Used at Home walker, rolling;wheelchair;oxygen    Concerns to be Addressed discharge planning    Anticipated Changes Related to Illness none    Equipment Needed After Discharge none    Provided Post Acute Provider List? Refused    Refused Provider List Comment PT/OT eval pending at time of rounds. Pt refused going to rehab at this time.               Discharge Plan     Row Name 03/08/23 1643       Plan    Plan D/C plan: Anticipate home. PT/OT eval pending during CM rounds. Pt refused SNF.    Patient/Family in Agreement with Plan unable to assess  Refused rehab prior to PT/OT eval    Plan Comments Met with pt at bedside. Pt states he lives with friend Nii. Pt does not drive. Nii will drive at time of d/c. Pt states he  uses a walker, wheelchair, and oxygen at home. Pt states he uses 4L, supplied by [employer]. PCP and pharmacy confirmed. No financial barriers to meds. No current use of HHC. PT/OT eval pending at time of CM assessment. CM discussed possible need for SNF. Pt refused rehab at this time, stating he wants to go home. Palliative consulted, not onsite today, should eval tomorrow. Barrier to d/c: 11L High flow oxygen, IVF, IV abx, Nebs, IV steroids.                    Demographic Summary     Row Name 03/08/23 1634       General Information    Admission Type inpatient    Arrived From emergency department    Required Notices Provided Important Message from Medicare    Referral Source admission list    Reason for Consult discharge planning    Preferred Language English               Functional Status     Row Name 03/08/23 1639       Functional Status    Usual Activity Tolerance good    Current Activity Tolerance moderate       Functional Status, IADL    Medications assistive equipment    Meal Preparation assistive equipment    Housekeeping assistive equipment    Laundry assistive equipment    Shopping assistive equipment                      Patient Forms     Row Name 03/08/23 1644       Patient Forms    Important Message from Medicare (IMM) Delivered  [facility] 3/8    Delivered to Patient    Method of delivery In person              Met with patient in room wearing PPE: mask  Maintained distance greater than six feet and spent less than 15 minutes in the room.          Med Gonzales RN

## 2023-03-08 NOTE — PLAN OF CARE
Goal Outcome Evaluation:  Plan of Care Reviewed With: patient        Progress: improving   Patient is 89 y/o white male admitted on 3-7-23 for admission diagnosis of pneumonia.   Patient has been changed from IV Rocephin to IV Unasym.  Patient may need skilled rehab at discharge.  Will continue to monitor   If in agreement, please order PT and Palliative Care consult to discuss plan of care//goals//mobility needs.

## 2023-03-08 NOTE — CONSULTS
[facility] CONSULT      Patient Identification:  NAME:  Deric Wylie  Age:  89 y.o.   Sex:  male   :  1933   MRN:  [id number]       Chief complaint/Reason for consult: Hematuria after straight cath    History of present illness:  89 y.o. male admitted found to have a low urine output.  He was attempted be straight catheterized but no urine returned it then became bloody.  We are consulted for further evaluation and management.  No  history per chart review or records      Past medical history:  Past Medical History:   Diagnosis Date   • Cancer (HCC)     Right lung   • COPD (chronic obstructive pulmonary disease) (HCC)        Past surgical history:  Past Surgical History:   Procedure Laterality Date   • ABDOMINAL AORTIC ANEURYSM REPAIR     • APPENDECTOMY         Allergies:  Patient has no known allergies.    Home medications:  Medications Prior to Admission   Medication Sig Dispense Refill Last Dose   • aspirin 81 MG chewable tablet Chew 1 tablet Daily.      • ondansetron (Zofran) 8 MG tablet Take 1 tablet by mouth 3 (Three) Times a Day As Needed for Nausea or Vomiting. 30 tablet 5    • polyethylene glycol (MIRALAX) 17 g packet Take 17 g by mouth Daily. 24 each 1    • traMADol (ULTRAM) 50 MG tablet Take 1 tablet by mouth Every 6 (Six) Hours As Needed for Moderate Pain. 120 tablet 0         Hospital medications:  ampicillin-sulbactam, 1.5 g, Intravenous, Q6H  ipratropium-albuterol, 3 mL, Nebulization, 4x Daily - RT  methylPREDNISolone sodium succinate, 40 mg, Intravenous, Q8H  sodium chloride, 10 mL, Intravenous, Q12H         •  acetaminophen **OR** acetaminophen **OR** acetaminophen  •  aluminum-magnesium hydroxide-simethicone  •  benzonatate  •  melatonin  •  ondansetron **OR** ondansetron  •  sodium chloride  •  sodium chloride  •  traMADol    Family history:  Family History   Problem Relation Age of Onset   • Heart disease Father    • Heart disease Mother    • Cancer Brother        Social  history:  Social History     Tobacco Use   • Smoking status: Former   • Smokeless tobacco: Never   Vaping Use   • Vaping Use: Never used   Substance Use Topics   • Alcohol use: Never   • Drug use: Never           Objective:  TMax 24 hours:   Temp (24hrs), Av.3 °F (36.3 °C), Min:96.3 °F (35.7 °C), Max:97.9 °F (36.6 °C)      Vitals Ranges:   Temp:  [96.3 °F (35.7 °C)-97.9 °F (36.6 °C)] 97.9 °F (36.6 °C)  Heart Rate:  [107-117] 113  Resp:  [18-29] 18  BP: (124-151)/(78-93) 130/78    Intake/Output Last 3 shifts:  I/O last 3 completed shifts:  In:  [P.O.:240; IV Piggyback:1770]  Out: 300 [Urine:300]     Physical Exam:    General Appearance:    NAD   HEENT:    No trauma, hearing intact   Lungs:     Respirations unlabored, no audible wheezing    Heart:    No cyanosis, No significant edema   Abdomen:     Soft, ND    :    No suprapubic distention or tenderness, 16 Turk[place] Mccarty placed from kit with dark yellow urine return   Extremities:   No significant edema, no deformity   Lymphatic:   No neck or groin LAD               Results review:   I reviewed the patient's new clinical results.    Data review:  Lab Results (last 24 hours)     Procedure Component Value Units Date/Time    Urinalysis With Culture If Indicated - Urine, Clean Catch [[id number]]  (Abnormal) Collected: 23 1435    Specimen: Urine, Clean Catch Updated: 23 1520     Color, UA Red     Comment: Any Substance that causes an abnormal urine color can alter the accuracy of the chemical reactions.        Appearance, UA Turbid     pH, UA 6.0     Specific Gravity, UA >=1.030     Glucose, UA Negative     Ketones, UA 15 mg/dL (1+)     Bilirubin, UA Small (1+)     Comment: Confirmation testing is unavailable.  A serum bilirubin is recommended for further assessment.        Blood, UA Large (3+)     Protein,  mg/dL (2+)     Leuk Esterase, UA --     Comment: The Leukoesterase test cannot be performed due to the centrifugation of the specimen.            Nitrite, UA Negative     Urobilinogen, UA 0.2 E.U./dL    Narrative:      In absence of clinical symptoms, the presence of pyuria, bacteria, and/or nitrites on the urinalysis result does not correlate with infection.    Urinalysis, Microscopic Only - Urine, Clean Catch [[id number]]  (Abnormal) Collected: 03/08/23 1435    Specimen: Urine, Clean Catch Updated: 03/08/23 1519     RBC, UA Too Numerous to Count /HPF      WBC, UA 0-2 /HPF      Comment: Urine culture not indicated.        Bacteria, UA None Seen /HPF      Squamous Epithelial Cells, UA None Seen /HPF      Hyaline Casts, UA None Seen /LPF      Methodology Manual Light Microscopy    Blood Culture - Blood, Arm, Right [[id number]]  (Normal) Collected: 03/07/23 0641    Specimen: Blood from Arm, Right Updated: 03/08/23 0700     Blood Culture No growth at 24 hours    Basic Metabolic Panel [[id number]]  (Abnormal) Collected: 03/08/23 0033    Specimen: Blood Updated: 03/08/23 0106     Glucose 140 mg/dL      BUN 51 mg/dL      Creatinine 0.77 mg/dL      Sodium 138 mmol/L      Potassium 4.0 mmol/L      Chloride 96 mmol/L      CO2 27.0 mmol/L      Calcium 11.5 mg/dL      BUN/Creatinine Ratio 66.2     Anion Gap 15.0 mmol/L      eGFR 85.0 mL/min/1.73     Narrative:      GFR Normal >60  Chronic Kidney Disease <60  Kidney Failure <15    The GFR formula is only valid for adults with stable renal function between ages 18 and 70.    CBC Auto Differential [[id number]]  (Abnormal) Collected: 03/08/23 0033    Specimen: Blood Updated: 03/08/23 0053     WBC 9.10 10*3/mm3      RBC 5.59 10*6/mm3      Hemoglobin 15.7 g/dL      Hematocrit 49.5 %      MCV 88.5 fL      MCH 28.1 pg      MCHC 31.7 g/dL      RDW 15.4 %      RDW-SD 47.7 fl      MPV 7.9 fL      Platelets 165 10*3/mm3      Neutrophil % 87.8 %      Lymphocyte % 2.9 %      Monocyte % 9.2 %      Eosinophil % 0.0 %      Basophil % 0.1 %      Neutrophils, Absolute 8.00 10*3/mm3      Lymphocytes, Absolute 0.30 10*3/mm3      Monocytes,  Absolute 0.80 10*3/mm3      Eosinophils, Absolute 0.00 10*3/mm3      Basophils, Absolute 0.00 10*3/mm3      nRBC 0.1 /100 WBC            Imaging:  Imaging Results (Last 24 Hours)     ** No results found for the last 24 hours. **             Assessment:       Squamous cell carcinoma of lung, stage IV, right (HCC)    COPD with acute exacerbation (HCC)    Dehydration    Sepsis (HCC)    Postobstructive pneumonia    Acute-on-chronic respiratory failure (HCC)      Hematuria after straight cath  Oliguria  BPH with obstruction    Plan:     16 [place]ik Mccarty placed for dark yellow urine, minimal residual and not in retention  Previous CT images reviewed showing moderate BPH  Low urine output, maintain Mccarty to monitor urine output then may have voiding trial, do not catheterize for residuals less than 500 cc  May have voiding trial tomorrow morning if plan discharge tomorrow, otherwise plan Friday morning void trial    Miguel Hopson MD  [facility]  [address], [address]  [place] [address]  Office: [phone]  Cell: [phone]  Also available via Epic Secure Chat  03/08/23  16:37 EST

## 2023-03-08 NOTE — CONSULTS
Consult visit. Pt in room alone eating lunch. Pt asked for prayer for to feel the love of God. Pt says he has felt the love of God before and wants to again. He did not answer the question of why he does not feel God's love.  prayed for the pt accordingly and he was thankful. He was smiling and at peace. He accepted 's offer to follow up tomorrow. No other needs at this time.

## 2023-03-08 NOTE — THERAPY EVALUATION
Patient Name: Deric Wylie  : 1933    MRN: [id number]                              Today's Date: 3/8/2023       Admit Date: 3/7/2023    Visit Dx:     ICD-10-CM ICD-9-CM   1. Squamous cell carcinoma of lung, stage IV, right (HCC)  C34.91 162.9   2. Acute exacerbation of chronic obstructive pulmonary disease (COPD) (HCC)  J44.1 491.21   3. Postobstructive pneumonia  J18.9 486   4. Dehydration  E86.0 276.51   5. Sepsis with acute organ dysfunction without septic shock, due to unspecified organism, unspecified type (HCC)  A41.9 038.9    R65.20 995.92     Patient Active Problem List   Diagnosis   • Acute respiratory distress   • Acute respiratory failure with hypoxia (HCC)   • Emphysema lung (HCC)   • COPD with acute exacerbation (HCC)   • Hyponatremia   • Generalized weakness   • Chronic venous stasis   • Shortness of breath   • Malignant neoplasm of upper lobe of right lung (HCC)   • Dehydration   • Squamous cell carcinoma of lung, stage IV, right (HCC)   • Sepsis (HCC)   • Postobstructive pneumonia   • Acute-on-chronic respiratory failure (HCC)     Past Medical History:   Diagnosis Date   • Cancer (HCC)     Right lung   • COPD (chronic obstructive pulmonary disease) (HCC)      Past Surgical History:   Procedure Laterality Date   • ABDOMINAL AORTIC ANEURYSM REPAIR     • APPENDECTOMY        General Information     Row Name 23 0952          OT Time and Intention    Document Type evaluation  -BL     Mode of Treatment co-treatment  -BL     Row Name 23 0952          General Information    Patient Profile Reviewed yes  -BL     Prior Level of Function mod assist:;ADL's;gait  Pt reports he lives with his friend Stephanie who assists with ADLs and helps him walk as needed. Pt reports when Stephanie is not there he stays in a recliner  -BL     Existing Precautions/Restrictions fall  -BL     Barriers to Rehab medically complex;previous functional deficit  -BL     Row Name 23 0952          Living Environment     People in Home other (see comments)  Friend Stephanie  -     Row Name 03/08/23 0952          Cognition    Orientation Status (Cognition) oriented x 4  -     Row Name 03/08/23 0952          Safety Issues, Functional Mobility    Safety Issues Affecting Function (Mobility) insight into deficits/self-awareness  -     Impairments Affecting Function (Mobility) endurance/activity tolerance;shortness of breath;strength  -           User Key  (r) = Recorded By, (t) = Taken By, (c) = Cosigned By    Initials Name Provider Type     Sima Newman OT Occupational Therapist                 Mobility/ADL's     Row Name 03/08/23 0955          Bed Mobility    Bed Mobility supine-sit  -     Supine-Sit [place] (Bed Mobility) minimum assist (75% patient effort)  -     Assistive Device (Bed Mobility) bed rails;head of bed elevated  -     Row Name 03/08/23 0955          Transfers    Transfers sit-stand transfer  -     Row Name 03/08/23 0955          Sit-Stand Transfer    Sit-Stand [place] (Transfers) minimum assist (75% patient effort)  -     Assistive Device (Sit-Stand Transfers) walker, front-wheeled  -     Row Name 03/08/23 0955          Functional Mobility    Functional Mobility- Ind. Level contact guard assist  -     Functional Mobility- Device walker, front-wheeled  -     Row Name 03/08/23 0955          Activities of Daily Living    BADL Assessment/Intervention lower body dressing;toileting  -     Row Name 03/08/23 0955          Lower Body Dressing Assessment/Training    [place] Level (Lower Body Dressing) don;socks;dependent (less than 25% patient effort)  -     Position (Lower Body Dressing) edge of bed sitting  -     Row Name 03/08/23 0955          Toileting Assessment/Training    [place] Level (Toileting) dependent (less than 25% patient effort);perform perineal hygiene  -     Position (Toileting) edge of bed sitting  -           User Key  (r) = Recorded By, (t) = Taken By,  (c) = Cosigned By    Initials Name Provider Type     Sima Newman OT Occupational Therapist               Obj/Interventions     Row Name 03/08/23 1312          Range of Motion Comprehensive    Comment, General Range of Motion BUEs grossly WFL  -BL     Row Name 03/08/23 1312          Strength Comprehensive (MMT)    Comment, General Manual Muscle Testing (MMT) Assessment BUEs grossly 3+/5  -BL     Row Name 03/08/23 1312          Motor Skills    Motor Skills functional endurance  -BL     Functional Endurance fair  -BL     Row Name 03/08/23 1312          Balance    Balance Assessment sitting static balance;sitting dynamic balance;standing static balance;standing dynamic balance  -BL     Static Sitting Balance contact guard  -BL     Dynamic Sitting Balance contact guard  -BL     Static Standing Balance minimal assist  -BL     Dynamic Standing Balance minimal assist  -BL     Position/Device Used, Standing Balance supported  -BL           User Key  (r) = Recorded By, (t) = Taken By, (c) = Cosigned By    Initials Name Provider Type     Sima Newman OT Occupational Therapist               Goals/Plan     Row Name 03/08/23 1541          Dressing Goal 1 (OT)    Activity/Device (Dressing Goal 1, OT) dressing skills, all  -BL     [place]/Cues Needed (Dressing Goal 1, OT) moderate assist (50-74% patient effort)  -BL     Time Frame (Dressing Goal 1, OT) 2 weeks  -BL     Row Name 03/08/23 1541          Toileting Goal 1 (OT)    Activity/Device (Toileting Goal 1, OT) toileting skills, all  -BL     [place] Level/Cues Needed (Toileting Goal 1, OT) moderate assist (50-74% patient effort)  -BL     Time Frame (Toileting Goal 1, OT) 2 weeks  -BL     Row Name 03/08/23 1541          Grooming Goal 1 (OT)    Activity/Device (Grooming Goal 1, OT) grooming skills, all  -BL     [place] (Grooming Goal 1, OT) contact guard required  -BL     Time Frame (Grooming Goal 1, OT) 2 weeks  -     Row Name 03/08/23 1541           Therapy Assessment/Plan (OT)    Planned Therapy Interventions (OT) activity tolerance training;BADL retraining;functional balance retraining;neuromuscular control/coordination retraining;patient/caregiver education/training;transfer/mobility retraining;strengthening exercise;ROM/therapeutic exercise;occupation/activity based interventions;IADL retraining;cognitive/visual perception retraining  -           User Key  (r) = Recorded By, (t) = Taken By, (c) = Cosigned By    Initials Name Provider Type     Sima Newman, ANGELA Occupational Therapist               Clinical Impression     Row Name 03/08/23 1320          Pain Assessment    Pretreatment Pain Rating 0/10 - no pain  -BL     Posttreatment Pain Rating 0/10 - no pain  -BL     Row Name 03/08/23 1320          Plan of Care Review    Plan of Care Reviewed With patient  -     Outcome Evaluation 89 y/o M who was admitted 3/7/23 with c/o weakness, cough, and confusion x2 days. Pt with PMHx of lung CA, and COPD. This admit pt found to be septic with postobstructive pneumonia, acute-on-chronic respiratory failure, and COPD exacerbation related to stage IV lung CA. At baseline pt lives at home with a friend, Nii. Pt reports Nii assists with ADLs and functional mobility. Pt reports when Nii is not there he just stays in his recliner most the time. At time of evaluation, pt A&OX4 and on ~10L HF NC. Pt required min A for supine to sitting EOB. Pt destated to the 70s sitting EOB and pt O2 increased to 15L HF NC. Pt educated on PLB with fair carryover. Pt required dependent A for donning socks. Pt transfered to chair with min A. Pt presenting with decreased endurance, weakness, and limited activity tolerance. OT recommending SNF at d/c. OT will follow.  -     Row Name 03/08/23 1320          Therapy Assessment/Plan (OT)    Criteria for Skilled Therapeutic Interventions Met (OT) yes;skilled treatment is necessary  -     Therapy Frequency (OT) 5 times/wk  -BL      Row Name 03/08/23 1320          Therapy Plan Review/Discharge Plan (OT)    Anticipated Discharge Disposition (OT) skilled nursing facility  -BL     Row Name 03/08/23 1320          Vital Signs    O2 Delivery Pre Treatment hi-flow  -BL     Intra SpO2 (%) 78  -BL     O2 Delivery Intra Treatment hi-flow  -BL     Post SpO2 (%) 94  -BL     O2 Delivery Post Treatment hi-flow  15L NC  -BL     Row Name 03/08/23 1320          Positioning and Restraints    Pre-Treatment Position in bed  -BL     Post Treatment Position chair  -BL     In Chair notified nsg;call light within reach;encouraged to call for assist  -BL           User Key  (r) = Recorded By, (t) = Taken By, (c) = Cosigned By    Initials Name Provider Type    Sima Hernandez OT Occupational Therapist               Outcome Measures     Row Name 03/08/23 1339          How much help from another person do you currently need...    Turning from your back to your side while in flat bed without using bedrails? 3  -EJ     Moving from lying on back to sitting on the side of a flat bed without bedrails? 3  -EJ     Moving to and from a bed to a chair (including a wheelchair)? 3  -EJ     Standing up from a chair using your arms (e.g., wheelchair, bedside chair)? 3  -EJ     Climbing 3-5 steps with a railing? 2  -EJ     To walk in hospital room? 2  -EJ     AM-PAC 6 Clicks Score (PT) 16  -EJ     Highest level of mobility 5 --> Static standing  -EJ     Row Name 03/08/23 1339          Functional Assessment    Outcome Measure Options AM-PAC 6 Clicks Basic Mobility (PT)  -EJ           User Key  (r) = Recorded By, (t) = Taken By, (c) = Cosigned By    Initials Name Provider Type    Stephanie Cardona PT Physical Therapist                Occupational Therapy Education     Title: PT OT SLP Therapies (In Progress)     Topic: Occupational Therapy (Done)     Point: ADL training (Done)     Description:   Instruct learner(s) on proper safety adaptation and remediation techniques during self  care or transfers.   Instruct in proper use of assistive devices.              Learning Progress Summary           Patient Acceptance, E,TB, VU,NR by BL at 3/8/2023 1542                   Point: Home exercise program (Done)     Description:   Instruct learner(s) on appropriate technique for monitoring, assisting and/or progressing therapeutic exercises/activities.              Learning Progress Summary           Patient Acceptance, E,TB, VU,NR by BL at 3/8/2023 1542                   Point: Precautions (Done)     Description:   Instruct learner(s) on prescribed precautions during self-care and functional transfers.              Learning Progress Summary           Patient Acceptance, E,TB, VU,NR by BL at 3/8/2023 1542                   Point: Body mechanics (Done)     Description:   Instruct learner(s) on proper positioning and spine alignment during self-care, functional mobility activities and/or exercises.              Learning Progress Summary           Patient Acceptance, E,TB, VU,NR by BL at 3/8/2023 1542                               User Key     Initials Effective Dates Name Provider Type Discipline     09/22/22 -  Sima Newman OT Occupational Therapist OT              OT Recommendation and Plan  Planned Therapy Interventions (OT): activity tolerance training, BADL retraining, functional balance retraining, neuromuscular control/coordination retraining, patient/caregiver education/training, transfer/mobility retraining, strengthening exercise, ROM/therapeutic exercise, occupation/activity based interventions, IADL retraining, cognitive/visual perception retraining  Therapy Frequency (OT): 5 times/wk  Plan of Care Review  Plan of Care Reviewed With: patient  Outcome Evaluation: 89 y/o M who was admitted 3/7/23 with c/o weakness, cough, and confusion x2 days. Pt with PMHx of lung CA, and COPD. This admit pt found to be septic with postobstructive pneumonia, acute-on-chronic respiratory failure, and COPD  exacerbation related to stage IV lung CA. At baseline pt lives at home with a friend, Nii. Pt reports Nii assists with ADLs and functional mobility. Pt reports when Nii is not there he just stays in his recliner most the time. At time of evaluation, pt A&OX4 and on ~10L HF NC. Pt required min A for supine to sitting EOB. Pt destated to the 70s sitting EOB and pt O2 increased to 15L HF NC. Pt educated on PLB with fair carryover. Pt required dependent A for donning socks. Pt transfered to chair with min A. Pt presenting with decreased endurance, weakness, and limited activity tolerance. OT recommending SNF at d/c. OT will follow.     Time Calculation:    Time Calculation- OT     Row Name 03/08/23 1542             Time Calculation- OT    OT Start Time 0904  -BL      OT Stop Time 0937  -BL      OT Time Calculation (min) 33 min  -BL      OT Received On 03/08/23  -      OT - Next Appointment 03/09/23  -      OT Goal Re-Cert Due Date 03/22/23  -BL            User Key  (r) = Recorded By, (t) = Taken By, (c) = Cosigned By    Initials Name Provider Type    BL Sima Newman OT Occupational Therapist              Therapy Charges for Today     Code Description Service Date Service Provider Modifiers Qty    [id number] HC OT EVAL MOD COMPLEXITY 4 3/8/2023 Sima Newman OT GO 1               Sima Newman OT  3/8/2023

## 2023-03-08 NOTE — PROGRESS NOTES
[facility] Medicine Services Daily Progress Note    Patient Name: Deric Wylie  : 1933  MRN: [id number]  Primary Care Physician:  Bernardo Singh MD  Date of admission: 3/7/2023      Subjective      Chief Complaint: Generalized weakness     History of Present Illness: Deric Wylie is a 89 y.o. male with past medical history of stage IV lung cancer who presented to [facility] on 3/7/2023 complaining of poor oral intake including liquids for the last 3 days and feeling weak all over for the past week.  He reported to the ED provider a productive cough with green sputum with some blood flecks.  He complains of left pleuritic chest discomfort.  Family reported to the ED provider the patient was confused.  No family present at bedside during assessment.  Patient able to answer questions appropriately and provide information when asked.     In the ED, chest x-ray showed No improvement in appearance of the right hemithorax with moderate right pleural effusion and probable atelectasis right lung in addition to what could reflect tumor along the superior mediastinum on the right. More prominent interstitial changes within the left lung that may be due to edema or inflammatory infectious process or possibly lymphangitic spread of tumor. Emphysematous changes. Cardiomegaly. Atherosclerotic vascular calcification. All vitals stable on admission except heart rate 111, respirations 28, SPO2 89% on 11 L high flow nasal cannula. All labs unremarkable except HS troponin 45, proBNP 24,661, BUN 50, lactic 5.5.  Flu and COVID negative. Patient received albuterol sulfate inhaler, 2 g cefepime, 80 mg Solu-Medrol, 1770 mL normal saline bolus in ED. Hospitalist was contacted to admit patient for further care and management.         3/8/23 patient seen and examined in bed NAD, says feels better, still having significant dyspnea on 15 liters of oxygen, FINA RN patient with urinary retention,  will consult urology and place fountain catheter.     ROS Review of Systems   Constitutional: Positive for malaise/fatigue.   Respiratory: Positive for cough and sputum production.    Gastrointestinal: Negative for nausea and vomiting.   Neurological: Positive for weakness. Negative for focal weakness.       Objective      Vitals:   Temp:  [96.3 °F (35.7 °C)-97.9 °F (36.6 °C)] 97.9 °F (36.6 °C)  Heart Rate:  [107-117] 113  Resp:  [18-29] 21  BP: (124-151)/(72-93) 130/78  Flow (L/min):  [8-15] 15    Physical Exam Constitutional:       General: He is sleeping.      Appearance: He is not ill-appearing.   HENT:      Head: Normocephalic and atraumatic.      Mouth/Throat:      Mouth: Mucous membranes are dry.      Pharynx: Oropharynx is clear.   Eyes:      Extraocular Movements: Extraocular movements intact.      Pupils: Pupils are equal, round, and reactive to light.   Cardiovascular:      Rate and Rhythm: Regular rhythm. Tachycardia present.      Pulses: Normal pulses.      Heart sounds: Normal heart sounds.   Pulmonary:      Effort: Respiratory distress present.      Breath sounds: Examination of the right-middle field reveals decreased breath sounds. Examination of the right-lower field reveals decreased breath sounds. Decreased breath sounds present.      Comments: Patient requiring high flow nasal cannula at 11 L/min  Abdominal:      General: Abdomen is flat. Bowel sounds are normal.      Palpations: Abdomen is soft.   Musculoskeletal:         General: Normal range of motion.      Cervical back: Normal range of motion and neck supple.   Skin:     General: Skin is warm and dry.      Capillary Refill: Capillary refill takes 2 to 3 seconds.   Neurological:      General: No focal deficit present.      Mental Status: He is oriented to person, place, and time.      Motor: Weakness present.   Psychiatric:         Thought Content: Thought content normal.         Judgment: Judgment normal.           Result Review    Result  Review:  I have personally reviewed the results from the time of this admission to 3/8/2023 13:22 EST and agree with these findings:  []  Laboratory  []  Microbiology  []  Radiology  []  EKG/Telemetry   []  Cardiology/Vascular   []  Pathology  []  Old records  []  Other:  Most notable findings include:     CBC:      Lab 03/08/23  0033 03/07/23  0641   WBC 9.10 8.60   HEMOGLOBIN 15.7 13.5   HEMATOCRIT 49.5 42.9   PLATELETS 165 163   NEUTROS ABS 8.00* 7.10*   LYMPHS ABS 0.30* 0.40*   MONOS ABS 0.80 1.20*   EOS ABS 0.00 0.00   MCV 88.5 89.2     CMP:      Lab 03/08/23  0033 03/07/23  0641   SODIUM 138 137   POTASSIUM 4.0 4.1   CHLORIDE 96* 98   CO2 27.0 24.0   ANION GAP 15.0 15.0   BUN 51* 50*   CREATININE 0.77 0.85   EGFR 85.0 82.5   GLUCOSE 140* 89   CALCIUM 11.5* 10.6*   TOTAL PROTEIN  --  6.0   ALBUMIN  --  2.9*   GLOBULIN  --  3.1   ALT (SGPT)  --  32   AST (SGOT)  --  82*   BILIRUBIN  --  1.4*   ALK PHOS  --  89     Blood Culture   Date Value Ref Range Status   03/07/2023 No growth at 24 hours  Preliminary     No results found for: BCIDPCR, CXREFLEX, CSFCX, CULTURETIS  No results found for: CULTURES, HSVCX, URCX  No results found for: EYECULTURE, GCCX, HSVCULTURE, LABHSV  No results found for: LEGIONELLA, MRSACX, MUMPSCX, MYCOPLASCX  No results found for: NOCARDIACX, STOOLCX  No results found for: THROATCX, UNSTIMCULT, URINECX, CULTURE, VZVCULTUR  No results found for: VIRALCULTU, WOUNDCX      Assessment & Plan      Brief Patient Summary:  Deric Wylie is a 89 y.o. male who       ampicillin-sulbactam, 1.5 g, Intravenous, Q6H  ipratropium-albuterol, 3 mL, Nebulization, 4x Daily - RT  methylPREDNISolone sodium succinate, 40 mg, Intravenous, Q8H  sodium chloride, 10 mL, Intravenous, Q12H             Active Hospital Problems:  Active Hospital Problems    Diagnosis    • **Squamous cell carcinoma of lung, stage IV, right (HCC)    • Sepsis (HCC)    • Postobstructive pneumonia    • Acute-on-chronic respiratory failure  (HCC)    • Dehydration    • COPD with acute exacerbation (HCC)      Sepsis with acute organ dysfunction without septic shock  Postobstructive pneumonia  - WBC 8.6  - Lactic Acid 5.5, reflux pending   - Blood Cultures, results pending   - Procalcitonin ordered  - UA ordered  - sputum culture   - Chest xray reviewed   - Cefepime given in ED, continue   - Requiring high flow nasal cannula 11 L/min  - IVF 1,770 given in ED        Acute exacerbation of chronic obstructive pulmonary disease  - Chest xray reviewed  - ABG ordered   - EKG ordered   - BNP 24,661.0  - 80 mg Solu-Medrol given in ED  - Solu-medrol 40 mg q8   - Duoneb prn  - tessalon prn   - Requiring high flow nasal cannula 11 L/min  - Pulmonology consulted >> considering Pleurx catheter thoracic surgery consulted  - Hold aspirin until cleared by thoracic surgery     Dehydration  - Creatinine 0.85, BUN 50, eGFR 82.5   - gentle hydration NS 50 mL/hr   - monitor cmp         Squamous cell carcinoma of lung stage IV, right  - completed palliative radiation to lung mass (09/23/22)  - Oncology Consulted  - Palliative care consult to discuss goals of treatment           DVT prophylaxis:  Mechanical DVT prophylaxis orders are present.    CODE STATUS:    Medical Intervention Limits: NO intubation (DNI)  Code Status (Patient has no pulse and is not breathing): No CPR (Do Not Attempt to Resuscitate)  Medical Interventions (Patient has pulse or is breathing): Limited Support      Disposition:  I expect patient to be discharged nh.    I have utilized all available, immediate resources to obtain, update, or review the patient's current medications including all prescriptions, over-the-counter products, herbals, cannabis/cannabidiol products, and vitamin.mineral/dietary (nutritional) supplements.      Medical Intervention Limits: NO intubation (DNI)  Code Status (Patient has no pulse and is not breathing): No CPR (Do Not Attempt to Resuscitate)  Medical Interventions (Patient  has pulse or is breathing): Limited Support    Next of kin of Power of :         Admission Status:  I believe this patient meets admit status.    Hospital Medicine Quality Measures for Heart Failure:  The patient has a history of heart transplant or LVAD. no          This patient has been examined wearing appropriate Personal Protective Equipment and discussed with rn. 03/08/23      Electronically signed by Jose Villasenor MD, 03/08/23, 13:22 EST.  [facility]ist [facility]

## 2023-03-08 NOTE — PLAN OF CARE
Goal Outcome Evaluation:  Plan of Care Reviewed With: patient           Outcome Evaluation: Patient is a 89 y/o M who was admitted 3/7/23 with c/o weakness, cough, and confusion x2 days.  Pt with PMHx of lung CA, and COPD.  This admit pt found to be septic with postobstructive pneumonia, acute-on-chronic respiratory failure, and COPD exacerbation related to stage IV lung CA.  At baseline pt lives at home with friend, Nii.  He assists him with ADLs and ambulation.  Pt uses RW to ambulate.  Pt uses O2 at home.  During today's evalutation pt was bumped up to 15 L of O2 during treatment.  While sitting EOB pt was dropping into 70's.  Pt recovered once O2 increased and once sitting EOB, after some time, he was able to recover to >90% O2 saturation.  Pt was then able to perform stand pivot sit transfer, using RW, bed to chair.  Once sitting pt recovered O2 to 94%.  Pt's nurse notified that pt was sitting up in chair.  At this time pt demonstrated weakness, reduced endurance/activity tolerance, and balance deficits.  PT recommends pt d/c to SNF at this time.

## 2023-03-08 NOTE — THERAPY EVALUATION
Patient Name: Deric Wylie  : 1933    MRN: [id number]                              Today's Date: 3/8/2023       Admit Date: 3/7/2023    Visit Dx:     ICD-10-CM ICD-9-CM   1. Squamous cell carcinoma of lung, stage IV, right (HCC)  C34.91 162.9   2. Acute exacerbation of chronic obstructive pulmonary disease (COPD) (HCC)  J44.1 491.21   3. Postobstructive pneumonia  J18.9 486   4. Dehydration  E86.0 276.51   5. Sepsis with acute organ dysfunction without septic shock, due to unspecified organism, unspecified type (HCC)  A41.9 038.9    R65.20 995.92     Patient Active Problem List   Diagnosis   • Acute respiratory distress   • Acute respiratory failure with hypoxia (HCC)   • Emphysema lung (HCC)   • COPD with acute exacerbation (HCC)   • Hyponatremia   • Generalized weakness   • Chronic venous stasis   • Shortness of breath   • Malignant neoplasm of upper lobe of right lung (HCC)   • Dehydration   • Squamous cell carcinoma of lung, stage IV, right (HCC)   • Sepsis (HCC)   • Postobstructive pneumonia   • Acute-on-chronic respiratory failure (HCC)     Past Medical History:   Diagnosis Date   • Cancer (HCC)     Right lung   • COPD (chronic obstructive pulmonary disease) (HCC)      Past Surgical History:   Procedure Laterality Date   • ABDOMINAL AORTIC ANEURYSM REPAIR     • APPENDECTOMY        General Information     Row Name 23 1323          Physical Therapy Time and Intention    Document Type evaluation  -EJ     Mode of Treatment co-treatment;physical therapy;occupational therapy  -EJ     Row Name 23 1323          General Information    Patient Profile Reviewed yes  -EJ     Prior Level of Function mod assist:;gait;ADL's  Pt reports he lives with his friend Nii who assists with ADLs and helps him walk as needed. Pt reports when Nii is not there he stays in a recliner.  -EJ     Existing Precautions/Restrictions fall  -EJ     Barriers to Rehab medically complex;previous functional deficit  -EJ      Row Name 03/08/23 1323          Living Environment    People in Home other (see comments)  friend Nii.  -     Row Name 03/08/23 1323          Cognition    Orientation Status (Cognition) oriented x 4  -     Row Name 03/08/23 1323          Safety Issues, Functional Mobility    Impairments Affecting Function (Mobility) endurance/activity tolerance;shortness of breath;strength  -EJ           User Key  (r) = Recorded By, (t) = Taken By, (c) = Cosigned By    Initials Name Provider Type    EJ Stephanie Ramos, PT Physical Therapist               Mobility     Row Name 03/08/23 1325          Bed Mobility    Bed Mobility supine-sit  -EJ     Supine-Sit [place] (Bed Mobility) minimum assist (75% patient effort)  -     Assistive Device (Bed Mobility) bed rails;head of bed elevated  -     Row Name 03/08/23 1338          Bed-Chair Transfer    Bed-Chair [place] (Transfers) minimum assist (75% patient effort)  -     Assistive Device (Bed-Chair Transfers) walker, front-wheeled  -     Row Name 03/08/23 1325          Sit-Stand Transfer    Sit-Stand [place] (Transfers) minimum assist (75% patient effort)  -     Assistive Device (Sit-Stand Transfers) walker, front-wheeled  -           User Key  (r) = Recorded By, (t) = Taken By, (c) = Cosigned By    Initials Name Provider Type    EJ Stephanie Ramos, PT Physical Therapist               Obj/Interventions     Row Name 03/08/23 1325          Range of Motion Comprehensive    Comment, General Range of Motion BLE grossly WFL.  -     Row Name 03/08/23 1325          Strength Comprehensive (MMT)    Comment, General Manual Muscle Testing (MMT) Assessment Pt with global weakness  -     Row Name 03/08/23 1325          Motor Skills    Motor Skills functional endurance  -     Functional Endurance Fair  -     Row Name 03/08/23 1325          Balance    Balance Assessment sitting static balance;sit to stand dynamic balance;sitting dynamic balance;standing static  balance;standing dynamic balance  -EJ     Static Sitting Balance contact guard  -EJ     Dynamic Sitting Balance contact guard  -EJ     Sit to Stand Dynamic Balance minimal assist  -EJ     Static Standing Balance minimal assist  -EJ     Dynamic Standing Balance minimal assist  -EJ     Position/Device Used, Standing Balance supported  -EJ     Balance Interventions sit to stand;standing;sitting;supported;static;dynamic;minimal challenge  -EJ           User Key  (r) = Recorded By, (t) = Taken By, (c) = Cosigned By    Initials Name Provider Type    Stephanie Cardona, PT Physical Therapist               Goals/Plan     Row Name 03/08/23 1337          Bed Mobility Goal 1 (PT)    Activity/Assistive Device (Bed Mobility Goal 1, PT) bed mobility activities, all  -EJ     [place] Level/Cues Needed (Bed Mobility Goal 1, PT) standby assist  -EJ     Time Frame (Bed Mobility Goal 1, PT) long term goal (LTG);2 weeks  -[facility] Name 03/08/23 1337          Transfer Goal 1 (PT)    Activity/Assistive Device (Transfer Goal 1, PT) transfers, all;walker, rolling  -EJ     [place] Level/Cues Needed (Transfer Goal 1, PT) contact guard required  -EJ     Time Frame (Transfer Goal 1, PT) long term goal (LTG);2 weeks  -[facility] Name 03/08/23 1337          Gait Training Goal 1 (PT)    Activity/Assistive Device (Gait Training Goal 1, PT) gait (walking locomotion);walker, rolling  -EJ     [place] Level (Gait Training Goal 1, PT) contact guard required  -EJ     Time Frame (Gait Training Goal 1, PT) long term goal (LTG);2 weeks  -     Row Name 03/08/23 1337          Therapy Assessment/Plan (PT)    Planned Therapy Interventions (PT) balance training;bed mobility training;gait training;home exercise program;postural re-education;patient/family education;neuromuscular re-education;strengthening;transfer training  -EJ           User Key  (r) = Recorded By, (t) = Taken By, (c) = Cosigned By    Initials Name Provider Type    CHANA Ramos  Stephanie BURROUGHS, PT Physical Therapist               Clinical Impression     Row Name 03/08/23 1327          Pain    Additional Documentation Pain Scale: FACES Pre/Post-Treatment (Group)  -     Row Name 03/08/23 1327          Pain Scale: FACES Pre/Post-Treatment    Pain: FACES Scale, Pretreatment 0-->no hurt  -EJ     Posttreatment Pain Rating 0-->no hurt  -EJ     Row Name 03/08/23 1327          Plan of Care Review    Plan of Care Reviewed With patient  -     Outcome Evaluation Patient is a 89 y/o M who was admitted 3/7/23 with c/o weakness, cough, and confusion x2 days.  Pt with PMHx of lung CA, and COPD.  This admit pt found to be septic with postobstructive pneumonia, acute-on-chronic respiratory failure, and COPD exacerbation related to stage IV lung CA.  At baseline pt lives at home with friend, Nii.  He assists him with ADLs and ambulation.  Pt uses RW to ambulate.  Pt uses O2 at home.  During today's evalutation pt was bumped up to 15 L of O2 during treatment.  While sitting EOB pt was dropping into 70's.  Pt recovered once O2 increased and once sitting EOB, after some time, he was able to recover to >90% O2 saturation.  Pt was then able to perform stand pivot sit transfer, using RW, bed to chair.  Once sitting pt recovered O2 to 94%.  Pt's nurse notified that pt was sitting up in chair.  At this time pt demonstrated weakness, reduced endurance/activity tolerance, and balance deficits.  PT recommends pt d/c to SNF at this time.  -     Row Name 03/08/23 1327          Therapy Assessment/Plan (PT)    Criteria for Skilled Interventions Met (PT) yes;skilled treatment is necessary  -     Therapy Frequency (PT) 3 times/wk  -     Predicted Duration of Therapy Intervention (PT) until discharge  -     Row Name 03/08/23 1327          Positioning and Restraints    Pre-Treatment Position in bed  -     Post Treatment Position chair  -EJ     In Chair sitting;call light within reach;encouraged to call for assist;exit  alarm on;notified nsg;with nsg;legs elevated  -           User Key  (r) = Recorded By, (t) = Taken By, (c) = Cosigned By    Initials Name Provider Type    Stephanie Cardona, PT Physical Therapist               Outcome Measures     Row Name 03/08/23 1339          How much help from another person do you currently need...    Turning from your back to your side while in flat bed without using bedrails? 3  -EJ     Moving from lying on back to sitting on the side of a flat bed without bedrails? 3  -EJ     Moving to and from a bed to a chair (including a wheelchair)? 3  -EJ     Standing up from a chair using your arms (e.g., wheelchair, bedside chair)? 3  -EJ     Climbing 3-5 steps with a railing? 2  -EJ     To walk in hospital room? 2  -EJ     AM-PAC 6 Clicks Score (PT) 16  -EJ     Highest level of mobility 5 --> Static standing  -     Row Name 03/08/23 1339          Functional Assessment    Outcome Measure Options AM-PAC 6 Clicks Basic Mobility (PT)  -           User Key  (r) = Recorded By, (t) = Taken By, (c) = Cosigned By    Initials Name Provider Type    Stephanie Cardona, PT Physical Therapist                             Physical Therapy Education     Title: PT OT SLP Therapies (In Progress)     Topic: Physical Therapy (In Progress)     Point: Mobility training (Done)     Learning Progress Summary           Patient Acceptance, E,TB, VU by  at 3/8/2023 1339                   Point: Home exercise program (Not Started)     Learner Progress:  Not documented in this visit.          Point: Body mechanics (Done)     Learning Progress Summary           Patient Acceptance, E,TB, VU by  at 3/8/2023 1339                   Point: Precautions (Done)     Learning Progress Summary           Patient Acceptance, E,TB, VU by  at 3/8/2023 1339                               User Key     Initials Effective Dates Name Provider Type [facility] 06/16/21 -  Stephanie Ramos, PT Physical Therapist PT              PT  Recommendation and Plan  Planned Therapy Interventions (PT): balance training, bed mobility training, gait training, home exercise program, postural re-education, patient/family education, neuromuscular re-education, strengthening, transfer training  Plan of Care Reviewed With: patient  Outcome Evaluation: Patient is a 89 y/o M who was admitted 3/7/23 with c/o weakness, cough, and confusion x2 days.  Pt with PMHx of lung CA, and COPD.  This admit pt found to be septic with postobstructive pneumonia, acute-on-chronic respiratory failure, and COPD exacerbation related to stage IV lung CA.  At baseline pt lives at home with friend, Nii.  He assists him with ADLs and ambulation.  Pt uses RW to ambulate.  Pt uses O2 at home.  During today's evalutation pt was bumped up to 15 L of O2 during treatment.  While sitting EOB pt was dropping into 70's.  Pt recovered once O2 increased and once sitting EOB, after some time, he was able to recover to >90% O2 saturation.  Pt was then able to perform stand pivot sit transfer, using RW, bed to chair.  Once sitting pt recovered O2 to 94%.  Pt's nurse notified that pt was sitting up in chair.  At this time pt demonstrated weakness, reduced endurance/activity tolerance, and balance deficits.  PT recommends pt d/c to SNF at this time.     Time Calculation:    PT Charges     Row Name 03/08/23 1339             Time Calculation    Start Time 0904  -EJ      Stop Time 0937  -EJ      Time Calculation (min) 33 min  -EJ      PT Received On 03/08/23  -EJ      PT - Next Appointment 03/09/23  -EJ      PT Goal Re-Cert Due Date 03/22/23  -EJ         Time Calculation- PT    Total Timed Code Minutes- PT 0 minute(s)  -EJ            User Key  (r) = Recorded By, (t) = Taken By, (c) = Cosigned By    Initials Name Provider Type    Stephanie Cardona, PT Physical Therapist              Therapy Charges for Today     Code Description Service Date Service Provider Modifiers Qty    [id number]  PT EVAL HIGH  COMPLEXITY 4 3/8/2023 Stephanie Ramos, PT GP 1          PT G-Codes  Outcome Measure Options: AM-PAC 6 Clicks Basic Mobility (PT)  AM-PAC 6 Clicks Score (PT): 16  PT Discharge Summary  Anticipated Discharge Disposition (PT): skilled nursing facility    Stephanie Ramos, PT  3/8/2023

## 2023-03-08 NOTE — PLAN OF CARE
Goal Outcome Evaluation:  Plan of Care Reviewed With: patient         Pt rested well through the night. Pt VSS stayed WNL. Pt had two IV's placed this shift as he wakes out of his sleep and pulls cords accidentally as the pt states. No complaints or distress noted will cont to monitor.

## 2023-03-08 NOTE — PROGRESS NOTES
Hematology/Oncology Inpatient Progress Note    PATIENT NAME: Deric Wylie  : 1933  MRN: [id number]    CHIEF COMPLAINT: Generalized Weakness    HISTORY OF PRESENT ILLNESS:      Deric Wylie is a 89 y.o. male who presented to [facility] on 3/7/2023 with complaints of weakness, fever, poor oral intake along with productive cough of green mucus with tinge of blood and pleuritic left chest pain. In the ER, he was found to have right pleural effusion, and left lung inflammatory infectious process on CXR as well as lactate of 5.5 with elevated heart rate and respiratory rate along with need for 11 liters oxygen to maintain saturation of 89% and BNP 24,661. Orders for urinalysis, blood cultures, respiratory cultures sent.  The patient was given steroid, albuterol, fluids and started on IV antibiotics, then admitted for further management.     CXR 3/7/2023- moderate right pleural effusion and probable atelectasis right lung-could reflect tumor along the superior mediastinum on the right,, interstitial changes left lung may be edema or inflammatory infectious process or possibly lymphatic spread of tumor, emphysematous changes     23  Hematology/Oncology was consulted for established patient with stage IV Squamous carcinoma RUL diagnosed with CT guided biopsy 22, PDL 1% and TMB high. An MRI brain at this time was negative for brain metastasis and he subsequently completed palliative radiation 2022. The patient did not want chemotherapy and was started on Keytruda 10/12/22, and completed cycle #4 22 with subsequent CT chest 12/15/22 showing mild decrease in mass RUL, multiple pulmonary nodules throughout the left lung, and large right pleural effusion. He underwent US thoracentesis 22 for increasing SOB with 3 liters fluid obtained and again on 23 with 3 liters removed from right lung. The patient now presents with weakness, poor oral intake and elevated lactate and  "procalcitonin along with productive cough of green mucus.  The patient remains on high flow oxygen while receiving antibiotic therapy.  Oncology will obtain CT chest after pleurx catheter to observe for progression.     He  has a past medical history of Cancer (HCC) and COPD (chronic obstructive pulmonary disease) (HCC).     PCP: Bernardo Singh MD    History of present illness was reviewed and is unchanged from the previous visit. 03/08/23    Subjective   Patient has ongoing shortness of breath, increased oxygen requirement.     MEDICATIONS:    Scheduled Meds:  ampicillin-sulbactam, 1.5 g, Intravenous, Q6H  ipratropium-albuterol, 3 mL, Nebulization, 4x Daily - RT  methylPREDNISolone sodium succinate, 40 mg, Intravenous, Q8H  sodium chloride, 10 mL, Intravenous, Q12H       Continuous Infusions:      PRN Meds:  •  acetaminophen **OR** acetaminophen **OR** acetaminophen  •  aluminum-magnesium hydroxide-simethicone  •  benzonatate  •  melatonin  •  ondansetron **OR** ondansetron  •  sodium chloride  •  sodium chloride  •  traMADol     ALLERGIES:  No Known Allergies    Objective    VITALS:   /78 (BP Location: Left arm, Patient Position: Lying)   Pulse 113   Temp 97.9 °F (36.6 °C) (Axillary)   Resp 21   Ht 182.9 cm (72\")   Wt 54.7 kg (120 lb 9.5 oz)   SpO2 91%   BMI 16.36 kg/m²     PHYSICAL EXAM: (performed by MD)  Physical Exam  Vitals and nursing note reviewed.   HENT:      Head: Normocephalic.      Mouth/Throat:      Mouth: Mucous membranes are dry.   Eyes:      Pupils: Pupils are equal, round, and reactive to light.   Cardiovascular:      Rate and Rhythm: Regular rhythm. Tachycardia present.   Pulmonary:      Effort: Respiratory distress present.      Comments: Decreased breath sounds  Oxygen 15 liters   Abdominal:      General: Bowel sounds are normal. There is no distension.      Palpations: There is no mass.      Tenderness: There is no abdominal tenderness.   Genitourinary:     Comments: " Urinary catheter  Musculoskeletal:         General: Normal range of motion.      Cervical back: Normal range of motion.      Right lower leg: No edema.      Left lower leg: No edema.   Lymphadenopathy:      Cervical: No cervical adenopathy.   Skin:     General: Skin is warm and dry.      Findings: No erythema or rash.   Neurological:      General: No focal deficit present.      Mental Status: He is alert.      Motor: Weakness present.   Psychiatric:         Behavior: Behavior normal.           RECENT LABS:  Lab Results (last 24 hours)     Procedure Component Value Units Date/Time    Blood Culture - Blood, Arm, Right [[id number]]  (Normal) Collected: 03/07/23 0641    Specimen: Blood from Arm, Right Updated: 03/08/23 0700     Blood Culture No growth at 24 hours    Basic Metabolic Panel [[id number]]  (Abnormal) Collected: 03/08/23 0033    Specimen: Blood Updated: 03/08/23 0106     Glucose 140 mg/dL      BUN 51 mg/dL      Creatinine 0.77 mg/dL      Sodium 138 mmol/L      Potassium 4.0 mmol/L      Chloride 96 mmol/L      CO2 27.0 mmol/L      Calcium 11.5 mg/dL      BUN/Creatinine Ratio 66.2     Anion Gap 15.0 mmol/L      eGFR 85.0 mL/min/1.73     Narrative:      GFR Normal >60  Chronic Kidney Disease <60  Kidney Failure <15    The GFR formula is only valid for adults with stable renal function between ages 18 and 70.    CBC Auto Differential [[id number]]  (Abnormal) Collected: 03/08/23 0033    Specimen: Blood Updated: 03/08/23 0053     WBC 9.10 10*3/mm3      RBC 5.59 10*6/mm3      Hemoglobin 15.7 g/dL      Hematocrit 49.5 %      MCV 88.5 fL      MCH 28.1 pg      MCHC 31.7 g/dL      RDW 15.4 %      RDW-SD 47.7 fl      MPV 7.9 fL      Platelets 165 10*3/mm3      Neutrophil % 87.8 %      Lymphocyte % 2.9 %      Monocyte % 9.2 %      Eosinophil % 0.0 %      Basophil % 0.1 %      Neutrophils, Absolute 8.00 10*3/mm3      Lymphocytes, Absolute 0.30 10*3/mm3      Monocytes, Absolute 0.80 10*3/mm3      Eosinophils, Absolute 0.00  10*3/mm3      Basophils, Absolute 0.00 10*3/mm3      nRBC 0.1 /100 WBC           PENDING RESULTS:     IMAGING REVIEWED:  XR Chest 1 View    Result Date: 3/7/2023  Impression: 1.No improvement in appearance of the right hemithorax with moderate right pleural effusion and probable atelectasis right lung in addition to what could reflect tumor along the superior mediastinum on the right. 2.More prominent interstitial changes within the left lung that may be due to edema or inflammatory infectious process or possibly lymphangitic spread of tumor. 3.Emphysematous changes. 4.Cardiomegaly 5.Atherosclerotic vascular calcification. Electronically Signed: Daniel Cano  3/7/2023 6:50 AM EST  Workstation ID: [id number]      Assessment & Plan   ASSESSMENT:  A 89-year-old male with metastatic non-small cell lung cancer on treatment with Keytruda.  After Pleurx catheter placement, oncology will order CT chest to look for signs of disease progression for suspicion that patient is progressing with his recurrent effusions and would likely not be a candidate for chemotherapy.  Possible treatment consideration for Cyramza added to Keytruda based on lung biopsy study. The patient continues to need high flow oxygen to maintain adequate oxygen saturation and continues on IV antibiotic.  Will continue to follow    Squamous cell carcinoma right lung  CT-guided biopsy 9/14/2020-invasive moderately differentiated squamous cell carcinoma, PDL1- 1% positive, TMB high  Cytology right lung fluid consistent with malignant effusion  MRI brain 9/15/2022-negative for metastasis  Palliative radiation right lung completed 9/23/2022  Keytruda x4 cycles completed  US thoracentesis right lung- 3 L removed on 12/21/2022 and 2/22/2023  Now plan for pleurex catheter cardiothoracic surgery consulted.     Obstructive pneumonia/acute respiratory failure with hypoxia/COPD  CXR shows moderate to recurrent malignant right pleural effusion  Pulmonology on board-  recommends Pleurx catheter  Await surgical consult  Continues oxygen at 15 L high flow  Unasyn IV, Solu-Medrol IV      Sepsis  Lactate 3.2, procalcitonin 0.8 on admission  Blood culture normal at 24 hours

## 2023-03-08 NOTE — PLAN OF CARE
Goal Outcome Evaluation:  Plan of Care Reviewed With: patient           Outcome Evaluation: 89 y/o M who was admitted 3/7/23 with c/o weakness, cough, and confusion x2 days. Pt with PMHx of lung CA, and COPD. This admit pt found to be septic with postobstructive pneumonia, acute-on-chronic respiratory failure, and COPD exacerbation related to stage IV lung CA. At baseline pt lives at home with a friend, Nii. Pt reports Nii assists with ADLs and functional mobility. Pt reports when Nii is not there he just stays in his recliner most the time. At time of evaluation, pt A&OX4 and on ~10L HF NC. Pt required min A for supine to sitting EOB. Pt destated to the 70s sitting EOB and pt O2 increased to 15L HF NC. Pt educated on PLB with fair carryover. Pt required dependent A for donning socks. Pt transfered to chair with min A. Pt presenting with decreased endurance, weakness, and limited activity tolerance. OT recommending SNF at d/c. OT will follow.

## 2023-03-09 PROBLEM — J91.0 MALIGNANT PLEURAL EFFUSION: Status: ACTIVE | Noted: 2023-01-01

## 2023-03-09 PROBLEM — E43 SEVERE MALNUTRITION (HCC): Status: ACTIVE | Noted: 2023-01-01

## 2023-03-09 NOTE — DISCHARGE PLACEMENT REQUEST
"Deric Wylie (89 y.o. Male)     Date of Birth   02/25/1933    Social Security Number       Address   [facility] [address] [facility] [place] [facility]    Home Phone   [phone]    MRN   [id number]       [employer]    Marital Status                               Admission Date   3/7/23    Admission Type   Emergency    Admitting Provider   Jose Villasenor MD    Attending Provider   Jose Villasenor MD    Department, Room/Bed   [facility] 3A MEDICAL INPATIENT, 341/1       Discharge Date       Discharge Disposition       Discharge Destination                               Attending Provider: Jose Villasenor MD    Allergies: No Known Allergies    Isolation: None   Infection: None   Code Status: No CPR    Ht: 182.9 cm (72\")   Wt: 57.2 kg (126 lb 1.7 oz)    Admission Cmt: None   Principal Problem: Squamous cell carcinoma of lung, stage IV, right (HCC) [C34.91]                 Active Insurance as of 3/7/2023     Primary Coverage     Payor Plan Insurance Group Employer/Plan Group    MEDICARE MEDICARE A & B      Payor Plan Address Payor Plan Phone Number Payor Plan Fax Number Effective Dates    [address] [phone]  2/1/1998 - None Entered    [facility] [id number]       Subscriber Name Subscriber Birth Date Member ID       DERIC WYLIE 2/25/1933 [id number]           Secondary Coverage     Payor Plan Insurance Group Employer/Plan Group    [employer]HE[employer] BLUE CROSS AN[employer] BLUE CROSS BLUE SHIELD PPO [id number]     Payor Plan Address Payor Plan Phone Number Payor Plan Fax Number Effective Dates    [address] [phone]  1/1/2022 - None Entered    [facility] [id number]       Subscriber Name Subscriber Birth Date Member ID       DERIC WYLIE 2/25/1933 [id number]                 Emergency Contacts      (Rel.) Home Phone Work Phone Mobile Phone    Guillermina Reynolds (Neighbor) -- -- [phone]              "

## 2023-03-09 NOTE — PROGRESS NOTES
Daily Progress Note          Assessment    Acute respiratory failure with hypoxia  Moderate recurrent malignant right pleural effusion  Obstructive pneumonia     Squamous cell carcinoma of lung, stage IV, right (HCC)   -Diagnosed 9/14/2022 with CT-guided biopsy  -Completed palliative radiation  -Currently receiving immunotherapy     2/22/2023 status post right thoracentesis, 3 L removed     9/14/2022 required chest catheter placement, removed 9/17/2022  -cytology positive with malignancy consistent with non-small cell carcinoma     Elevated proBNP, 24,661     Chronic obstructive pulmonary disease  Severe emphysema     Recommendations:     Due to recurrent pleural effusion requiring frequent thoracentesis, thoracic surgery were consulted and they plan to do Pleurx catheter  Titrate oxygen, currently requiring 15 L high flow     Antibiotics cefepime, monitor mental status  Solu-Medrol 40 mg every 12 hours  Bronchodilator     Chest CT 3/7/2023                            CXR s/p thoracentesis 2/22/2023               LOS: 2 days     Subjective     Patient reports shortness of breath and cough    Objective     Vital signs for last 24 hours:  Vitals:    03/09/23 1207 03/09/23 1225 03/09/23 1229 03/09/23 1620   BP: 140/84   128/74   BP Location: Left arm   Left arm   Patient Position: Lying   Lying   Pulse: 107 105 106 109   Resp: 25 25 27 26   Temp: 97.7 °F (36.5 °C)   97.8 °F (36.6 °C)   TempSrc: Axillary   Axillary   SpO2: 90% 91% 90% 97%   Weight:       Height:           Intake/Output last 3 shifts:  I/O last 3 completed shifts:  In: 480 [P.O.:480]  Out: 550 [Urine:550]  Intake/Output this shift:  I/O this shift:  In: 240 [P.O.:240]  Out: 1850 [Urine:1850]      Radiology  Imaging Results (Last 24 Hours)     ** No results found for the last 24 hours. **          Labs:  Results from last 7 days   Lab Units 03/08/23  0033   WBC 10*3/mm3 9.10   HEMOGLOBIN g/dL 15.7   HEMATOCRIT % 49.5   PLATELETS 10*3/mm3 165     Results  from last 7 days   Lab Units 03/08/23  0033 03/07/23  0641   SODIUM mmol/L 138 137   POTASSIUM mmol/L 4.0 4.1   CHLORIDE mmol/L 96* 98   CO2 mmol/L 27.0 24.0   BUN mg/dL 51* 50*   CREATININE mg/dL 0.77 0.85   CALCIUM mg/dL 11.5* 10.6*   BILIRUBIN mg/dL  --  1.4*   ALK PHOS U/L  --  89   ALT (SGPT) U/L  --  32   AST (SGOT) U/L  --  82*   GLUCOSE mg/dL 140* 89     Results from last 7 days   Lab Units 03/07/23  0927   PH, ARTERIAL pH units 7.363   PO2 ART mm Hg 62.4*   PCO2, ARTERIAL mm Hg 45.8   HCO3 ART mmol/L 26.0     Results from last 7 days   Lab Units 03/07/23  0641   ALBUMIN g/dL 2.9*     Results from last 7 days   Lab Units 03/07/23  0641   HSTROP T ng/L 45*                           Meds:   SCHEDULE  methylPREDNISolone sodium succinate, 40 mg, Intravenous, Q8H  sodium chloride, 10 mL, Intravenous, Q12H  [START ON 3/10/2023] sodium chloride, 75 mL/hr, Intravenous, Once      Infusions     PRNs  •  acetaminophen **OR** acetaminophen **OR** acetaminophen  •  aluminum-magnesium hydroxide-simethicone  •  benzonatate  •  LORazepam  •  melatonin  •  Morphine  •  ondansetron **OR** ondansetron  •  sodium chloride  •  sodium chloride    Physical Exam:  General Appearance:  Alert   HEENT:  Normocephalic, without obvious abnormality, Conjunctiva/corneas clear,.   Nares normal, no drainage     Neck:  Supple, symmetrical, trachea midline.   Lungs /Chest wall:   Decreased breath sounds on right side with scattered crackles, respirations unlabored, symmetrical wall movement.     Heart:  Regular rate and rhythm, S1 S2 normal  Abdomen: Soft, non-tender, no masses, no organomegaly.    Extremities: No edema, no clubbing or cyanosis     ROS  Constitutional: Negative for chills, fever and positive for malaise/fatigue.   HENT: Negative.    Eyes: Negative.    Cardiovascular: Negative.    Respiratory: Positive for cough and shortness of breath.    Skin: Negative.    Musculoskeletal: Negative.    Gastrointestinal: Negative.     Genitourinary: Negative.    Neurological: Negative.    Psychiatric/Behavioral: Negative.      I reviewed the recent clinical results    Much of this encounter note is an electronic transcription/translation of spoken language to printed text using Dragon Software which might include inadvertent errors in transcription.

## 2023-03-09 NOTE — CASE MANAGEMENT/SOCIAL WORK
Continued Stay Note   Octaviano     Patient Name: Deric Wylie  MRN: [id number]  Today's Date: 3/9/2023    Admit Date: 3/7/2023    Plan: D/C plan: Hospice, referral to [facility] for eval and acceptance. Pt refused SNF.   Discharge Plan     Row Name 03/09/23 1400       Plan    Plan D/C plan: Hospice, referral to [facility] for eval and acceptance. Pt refused SNF.    Patient/Family in Agreement with Plan yes    Plan Comments CM was notified by palliative SW that pt wants hospice care. Referral was sent to [facility], liaison notified. [facility] will have someone come meet with pt today to discuss hospice and plans.              Phone communication or documentation only - no physical contact with patient or family.      Med Gonzales RN

## 2023-03-09 NOTE — CONSULTS
Palliative Care Social Work Progress Note    Code Status:do not resuscitate    Goals of Care: Limited Additonal Intervention     Narrative: Palliative care  met with pt to assess for goals of care.  Pt alert and oriented at time of contact.  Goals discussed and pt reports that he would like to proceed with hospice.  Pt very clear, no other needs expressed.  [facility]  Emotional support provided.      Plan:  -[facility] hospice referral  -Will continue to follow peripherally.          BEL Mcdaniel

## 2023-03-09 NOTE — CONSULTS
Initial Hospital Consult    Reason for consult: Recurrent pleural effusion, stage IV lung cancer  Requesting physician: Dr. Villasenor    Chief complaint: Shortness of breath and confusion    Subjective     History of Present Illness  Mr. Wylie is a pleasant 89-year-old gentleman who presented to the hospital on 3/7/2023 with complaints of weakness, fever, poor p.o. intake and a productive cough and left pleuritic chest pain.  He has known stage IV squamous cell carcinoma of the right upper lobe which was diagnosed on a CT-guided biopsy on 9/14/2022.  He has had 2 episodes of ultrasound-guided thoracentesis, the first occurring on 12/21/2022 and again on 2/22/2023 where both times they produced approximately 3 L of pleural fluid.  He is currently being worked up for bacteremia, his white blood cell count is normal and his cultures have all been normal to this point.  He does have an elevated lactate but it is trending down over the last 24 hours.    We are being asked to evaluate him for possible Pleurx catheter placement.    Review of Systems   All other systems reviewed and are negative.       Past Medical History:   Diagnosis Date   • Cancer (HCC)     Right lung   • COPD (chronic obstructive pulmonary disease) (HCC)    ,   Past Surgical History:   Procedure Laterality Date   • ABDOMINAL AORTIC ANEURYSM REPAIR     • APPENDECTOMY     ,   Family History   Problem Relation Age of Onset   • Heart disease Father    • Heart disease Mother    • Cancer Brother    ,   Medications Prior to Admission   Medication Sig Dispense Refill Last Dose   • aspirin 81 MG chewable tablet Chew 1 tablet Daily.      • ondansetron (Zofran) 8 MG tablet Take 1 tablet by mouth 3 (Three) Times a Day As Needed for Nausea or Vomiting. 30 tablet 5    • polyethylene glycol (MIRALAX) 17 g packet Take 17 g by mouth Daily. 24 each 1    • traMADol (ULTRAM) 50 MG tablet Take 1 tablet by mouth Every 6 (Six) Hours As Needed for Moderate Pain. 120 tablet  0     and Allergies:  Patient has no known allergies.    Objective      Vital Signs  Temp:  [96.8 °F (36 °C)-97.7 °F (36.5 °C)] 97.7 °F (36.5 °C)  Heart Rate:  [] 106  Resp:  [17-27] 27  BP: (124-140)/(78-84) 140/84    Physical Exam    Results Review:    I reviewed the patient's new clinical results.  I reviewed the patient's new imaging results and agree with the interpretation.  I reviewed the patient's other test results and agree with the interpretation  I personally viewed and interpreted the patient's EKG/Telemetry data  Discussed with patient and nursing staff     Lab Results:                WBC   Date Value Ref Range Status   03/08/2023 9.10 3.40 - 10.80 10*3/mm3 Final     Hemoglobin   Date Value Ref Range Status   03/08/2023 15.7 13.0 - 17.7 g/dL Final     Hematocrit   Date Value Ref Range Status   03/08/2023 49.5 37.5 - 51.0 % Final     Platelets   Date Value Ref Range Status   03/08/2023 165 140 - 450 10*3/mm3 Final     Sodium   Date Value Ref Range Status   03/08/2023 138 136 - 145 mmol/L Final     Potassium   Date Value Ref Range Status   03/08/2023 4.0 3.5 - 5.2 mmol/L Final     Chloride   Date Value Ref Range Status   03/08/2023 96 (L) 98 - 107 mmol/L Final     CO2   Date Value Ref Range Status   03/08/2023 27.0 22.0 - 29.0 mmol/L Final     BUN   Date Value Ref Range Status   03/08/2023 51 (H) 8 - 23 mg/dL Final     Creatinine   Date Value Ref Range Status   03/08/2023 0.77 0.76 - 1.27 mg/dL Final     Glucose   Date Value Ref Range Status   03/08/2023 140 (H) 65 - 99 mg/dL Final         Assessment & Plan       Squamous cell carcinoma of lung, stage IV, right (HCC)    COPD with acute exacerbation (HCC)    Dehydration    Sepsis (HCC)    Postobstructive pneumonia    Acute-on-chronic respiratory failure (HCC)    Malignant pleural effusion      Assessment & Plan  Mr. Wylie, is a pleasant 89-year-old gentleman that the thoracic surgery team was being asked for a possible Pleurx catheter placement on  the right side.  He has known stage IV lung cancer of the right upper lobe which is biopsy-proven via CT-guided biopsy.  He has had 2 episodes of thoracentesis over the last 3 to 4 months where he is drained approximately 3 L of fluid each time.  His chest x-ray from yesterday shows a moderate size right-sided pleural effusion.  I think it would be appropriate to place a Pleurx catheter in the right pleural space at this time.  He is currently afebrile, he does not have a leukocytosis and his lactate is trending down at this time.  As long as his lactate continues to trend down over the next 24 hours I think it would be appropriate to place this catheter, he is scheduled for tomorrow in the OR for Pleurx catheter placement by myself.    I discussed the patients findings and my recommendations with patient and nursing staff    Thank you for this consult and allowing us to participate in the care of your patient.  We will follow along with you during this hospitalization.     I spent greater than 75 minutes reviewing the chart, reviewing his images, reviewing the clinical data and laboratory findings, reviewing his past medical history, performing an appropriate history and physical exam, communicating with the staff, and surgical planning.  Lio Mancilla MD PhD  [facility]  03/09/23  13:46 EST

## 2023-03-09 NOTE — PLAN OF CARE
Problem: Adult Inpatient Plan of Care  Goal: Plan of Care Review  Outcome: Ongoing, Progressing  Flowsheets (Taken 3/9/2023 1707)  Progress: no change  Plan of Care Reviewed With: patient  Goal: Patient-Specific Goal (Individualized)  Outcome: Ongoing, Progressing  Goal: Absence of Hospital-Acquired Illness or Injury  Outcome: Ongoing, Progressing  Intervention: Identify and Manage Fall Risk  Recent Flowsheet Documentation  Taken 3/9/2023 1400 by Petra Tapia LPN  Safety Promotion/Fall Prevention: safety round/check completed  Taken 3/9/2023 1240 by Petra Tapia LPN  Safety Promotion/Fall Prevention: safety round/check completed  Taken 3/9/2023 1039 by Petra Tapia LPN  Safety Promotion/Fall Prevention: safety round/check completed  Taken 3/9/2023 0850 by Petra Tapia LPN  Safety Promotion/Fall Prevention: safety round/check completed  Goal: Optimal Comfort and Wellbeing  Outcome: Ongoing, Progressing  Goal: Readiness for Transition of Care  Outcome: Ongoing, Progressing     Problem: Adult Inpatient Plan of Care  Goal: Optimal Comfort and Wellbeing  Outcome: Ongoing, Progressing     Problem: Adult Inpatient Plan of Care  Goal: Readiness for Transition of Care  Outcome: Ongoing, Progressing   Goal Outcome Evaluation:  Plan of Care Reviewed With: patient        Progress: no change   Pt transition to hospice, pt will have pluerx catheter placement tomorrow, and possible d/c with [facility].

## 2023-03-09 NOTE — CONSULTS
"Nutrition Services    Patient Name: Deric Wylie  YOB: 1933  MRN: [id number]  Admission date: 3/7/2023    *See [facility] data at bottom of this note.    Severe chronic Dz related malnutrition R/t decreased intake in the setting of altered taste perception and ongoing CA; as evidenced by weight loss greater than 7.5% in the last three months, with predominantly severe muscle and fat loss per NFPE.    Will start Boost Plus BID (Provides 720 kcals, 28 g protein if consumed)   And  Magic Cups at lunch/dinner (Provides 580 kcals, 18 g protein if consumed)     Will also change diet to Soft To Chew, Ground Meats, per patient request.    Consider starting multivitamin/mineral supplement -- altered taste can be exacerbated by sub-optimal or deficient B-series vitamin levels.    PPE Documentation        PPE Worn By Provider mask, gloves and eye protection   PPE Worn By Patient  None     CLINICAL NUTRITION ASSESSMENT      Reason for Assessment 3/9: Low BMI, MST     H&P      Past Medical History:   Diagnosis Date   • Cancer (HCC)     Right lung   • COPD (chronic obstructive pulmonary disease) (HCC)        Past Surgical History:   Procedure Laterality Date   • ABDOMINAL AORTIC ANEURYSM REPAIR     • APPENDECTOMY          Current Problems   Sepsis with acute organ dysfunction without septic shock  Postobstructive pneumonia  - antibiotics in place       Acute exacerbation of chronic obstructive pulmonary disease  - Pulmonology following     CHF acute exacerbation     Dehydration     Squamous cell carcinoma of lung stage IV, right  - completed palliative radiation  - Oncology following  - Palliative care following     Hypercalcemia likely secondary to lung ca     Encounter Information        Trending Narrative     3/9: Pt assessed due to concern for low BMI and MST score 2+. Pt has had significant weight loss (14.8%) in the last three months. Pt is aware of loss and states he has been eating less due to \"food doesn't " "taste right,\" -- has had altered taste with ongoing CA Tx. Has not previously had any type of mouthwash or spray that has made a difference with this - pt confirms dysgeusia has persisted before and during admission. Endorses chewing problems - prefers soft foods and chopped/ground meats \"very small.\" Denies swallowing difficulties. NFPE is revealing for predominantly severe muscle and fat loss - see MSA for details. Pt states he does his own cooking at home -- provided with a resource showing ideas for high kcal high PRO incredients, high kcal mini-meals and snacks, and some high kcal shake recipes. Pt open to trying high kcal supplement (Boost, Magic Cup) while inpatient (has never used these previously but open to trying.) Will order. Noted plan for possible hospice care -- if this is the case, then liberalized diet and ONS remain the most appropriate nutrition interventions for now. Will continue to monitor goals of care. Secure message sent to attending to suggest multivitamin/mineral addition.      Anthropometrics        Current Height, Weight Height: 182.9 cm (72\")  Weight: 57.2 kg (126 lb 1.7 oz) (03/09/23 0438)       Ideal Body Weight (IBW) 178 lb   Usual Body Weight (UBW) UTD       Trending Weight Hx     This admission: 3/9: Scale weight 57.2 kg              PTA: 3/9: 14.8% loss in 3 months -- significant     Wt Readings from Last 30 Encounters:   03/09/23 0438 57.2 kg (126 lb 1.7 oz)   03/09/23 0011 57.1 kg (125 lb 14.1 oz)   03/08/23 0459 54.7 kg (120 lb 9.5 oz)   03/07/23 1454 53.2 kg (117 lb 4.6 oz)   03/07/23 0527 59 kg (130 lb)   02/22/23 1231 59 kg (130 lb)   02/15/23 1356 62.2 kg (137 lb 3.2 oz)   02/15/23 1411 62.1 kg (137 lb)   01/25/23 1344 64 kg (141 lb)   01/25/23 1409 64 kg (141 lb)   01/04/23 1346 64.2 kg (141 lb 9.6 oz)   12/21/22 1256 67.1 kg (148 lb)   12/21/22 1112 67.3 kg (148 lb 6.4 oz)   12/14/22 1332 67.1 kg (148 lb)   12/14/22 1408 66.7 kg (147 lb)   11/23/22 1323 67.6 kg (149 lb) "   11/23/22 1328 67.6 kg (149 lb)   11/02/22 1334 65.8 kg (145 lb)   10/24/22 1230 63.5 kg (140 lb)   10/12/22 0917 63.5 kg (140 lb)   10/03/22 1438 63.5 kg (140 lb)   09/13/22 2207 64.3 kg (141 lb 12.8 oz)   09/13/22 1611 72.1 kg (159 lb)   02/24/20 0259 72.5 kg (159 lb 13.3 oz)   02/22/20 0821 72.4 kg (159 lb 9.8 oz)      BMI kg/m2 Body mass index is 17.1 kg/m².       Labs        Pertinent Labs    Results from last 7 days   Lab Units 03/08/23  0033 03/07/23  0641   SODIUM mmol/L 138 137   POTASSIUM mmol/L 4.0 4.1   CHLORIDE mmol/L 96* 98   CO2 mmol/L 27.0 24.0   BUN mg/dL 51* 50*   CREATININE mg/dL 0.77 0.85   CALCIUM mg/dL 11.5* 10.6*   BILIRUBIN mg/dL  --  1.4*   ALK PHOS U/L  --  89   ALT (SGPT) U/L  --  32   AST (SGOT) U/L  --  82*   GLUCOSE mg/dL 140* 89     Results from last 7 days   Lab Units 03/08/23  0033   HEMOGLOBIN g/dL 15.7   HEMATOCRIT % 49.5     COVID19   Date Value Ref Range Status   03/07/2023 Not Detected Not Detected - Ref. Range Final     No results found for: HGBA1C     Medications    Scheduled Medications ampicillin-sulbactam, 1.5 g, Intravenous, Q6H  furosemide, 40 mg, Intravenous, BID  ipratropium-albuterol, 3 mL, Nebulization, 4x Daily - RT  methylPREDNISolone sodium succinate, 40 mg, Intravenous, Q8H  sodium chloride, 10 mL, Intravenous, Q12H  [START ON 3/10/2023] sodium chloride, 75 mL/hr, Intravenous, Once        Infusions      PRN Medications •  acetaminophen **OR** acetaminophen **OR** acetaminophen  •  aluminum-magnesium hydroxide-simethicone  •  benzonatate  •  melatonin  •  ondansetron **OR** ondansetron  •  sodium chloride  •  sodium chloride  •  traMADol     Physical Findings        Trending Physical   Appearance, NFPE 3/9: NFPE completed, consistent with nutrition diagnosis of malnutrition using AND/ASPEN criteria. See MSA below.      --  Edema  None documented      Bowel Function BM 3/8     Tubes No feeding tube in place       Chewing/Swallowing States he has trouble chewing  (dentition) and prefers chopped meats and soft foods; denies having any swallowing problems - states he can swallow just fine     Skin No breakdown documented      --  Current Nutrition Orders & Evaluation of Intake       Oral Nutrition     Food Allergies NKFA   Current PO Diet Diet: Regular/House Diet; Texture: Regular Texture (IDDSI 7); Fluid Consistency: Thin (IDDSI 0)  NPO Diet NPO Type: Sips with Meds   Supplement None ordered   PO Evaluation     Trending % PO Intake 3/9: Intakes only 25% at majority of meals so far (observed only vegetable soup and part of sliced tomato consumed at lunch meal)   --  Nutritional Risk Screening        NRS-2002 Score          Nutrition Diagnosis         Nutrition Dx Problem 1 Severe chronic Dz related malnutrition R/t decreased intake in the setting of altered taste perception and ongoing CA; as evidenced by weight loss greater than 7.5% in the last three months, with predominantly severe muscle and fat loss per NFPE.      Nutrition Dx Problem 2        Intervention Goal         Intervention Goal(s) Nutrition interventions C/W goals of care     Nutrition Intervention        RD Action Will start ONS     Nutrition Prescription          Diet Prescription Regular    Supplement Prescription Boost Plus BID (Provides 720 kcals, 28 g protein if consumed)   Magic Cups at lunch/dinner (Provides 580 kcals, 18 g protein if consumed)      --  Monitor/Evaluation        Monitor PO intake, Supplement intake, Pertinent labs, Weight, Skin status, GI status, POC/GOC     Malnutrition Severity Assessment      Patient meets criteria for : Severe Malnutrition  Malnutrition Type (last 8 hours)     Malnutrition Severity Assessment     Row Name 03/09/23 1506       Malnutrition Severity Assessment    Malnutrition Type Chronic Disease - Related Malnutrition    Row Name 03/09/23 1506       Unintentional Weight Loss     Unintentional Weight Loss Findings Severe    Unintentional Weight Loss  Weight loss greater  than 7.5% in three months    Row Name 03/09/23 1506       Muscle Loss    Loss of Muscle Mass Findings Severe    [place] Region Severe - deep hollowing/scooping, lack of muscle to touch, facial bones well defined    Clavicle Bone Region Severe - protruding prominent bone    Acromion Bone Region Severe - squared shoulders, bones, and acromion process protrusion prominent    Scapular Bone Region Severe - prominent bones, depressions easily visible between ribs, scapula, spine, shoulders    Dorsal Hand Region Severe - prominent depression    Patellar Region Severe - prominent bone, square looking, very little muscle definition    Anterior Thigh Region Severe - line/depression along thigh, obviously thin    Posterior Calf Region Moderate - some roundness, slight firmness    Row Name 03/09/23 1506       Fat Loss    Subcutaneous Fat Loss Findings Severe    Orbital Region  Severe - pronounced hollowness/depression, dark circles, loose saggy skin    Upper Arm Region Moderate - some fat tissue, not ample    Thoracic & Lumbar Region Severe - ribs visible with prominent depressions, iliac crest very prominent  only ribs assessed due to positioning    Row Name 03/09/23 1506       Criteria Met (Must meet criteria for severity in at least 2 of these categories: M Wasting, Fat Loss, Fluid, Secondary Signs, Wt. Status, Intake)    Patient meets criteria for  Severe Malnutrition                   Electronically signed by:  Dasha Case RD  03/09/23 14:16 EST

## 2023-03-09 NOTE — PROGRESS NOTES
[facility] Medicine Services Daily Progress Note    Patient Name: Deric Wylie  : 1933  MRN: [id number]  Primary Care Physician:  Bernardo Singh MD  Date of admission: 3/7/2023      Subjective      Chief Complaint: Generalized weakness     History of Present Illness: Deric Wylie is a 89 y.o. male with past medical history of stage IV lung cancer who presented to [facility] on 3/7/2023 complaining of poor oral intake including liquids for the last 3 days and feeling weak all over for the past week.  He reported to the ED provider a productive cough with green sputum with some blood flecks.  He complains of left pleuritic chest discomfort.  Family reported to the ED provider the patient was confused.  No family present at bedside during assessment.  Patient able to answer questions appropriately and provide information when asked.     In the ED, chest x-ray showed No improvement in appearance of the right hemithorax with moderate right pleural effusion and probable atelectasis right lung in addition to what could reflect tumor along the superior mediastinum on the right. More prominent interstitial changes within the left lung that may be due to edema or inflammatory infectious process or possibly lymphangitic spread of tumor. Emphysematous changes. Cardiomegaly. Atherosclerotic vascular calcification. All vitals stable on admission except heart rate 111, respirations 28, SPO2 89% on 11 L high flow nasal cannula. All labs unremarkable except HS troponin 45, proBNP 24,661, BUN 50, lactic 5.5.  Flu and COVID negative. Patient received albuterol sulfate inhaler, 2 g cefepime, 80 mg Solu-Medrol, 1770 mL normal saline bolus in ED. Hospitalist was contacted to admit patient for further care and management.         3/8/23 patient seen and examined in bed NAD, says feels better, still having significant dyspnea on 15 liters of oxygen, FINA RN patient with urinary retention,  will consult urology and place fountain catheter.  3/9/23 seen in bed NAD, c/o weakness fatigue, dyspnea on 15 lts, DW RN     ROS Review of Systems   Constitutional: Positive for malaise/fatigue.   Respiratory: Positive for cough and sputum production.    Gastrointestinal: Negative for nausea and vomiting.   Neurological: Positive for weakness. Negative for focal weakness.       Objective      Vitals:   Temp:  [96.8 °F (36 °C)-97.2 °F (36.2 °C)] 97.2 °F (36.2 °C)  Heart Rate:  [] 114  Resp:  [17-24] 24  BP: (124-135)/(78-84) 135/79  Flow (L/min):  [15] 15    Physical Exam Constitutional:       General: He is sleeping.      Appearance: He is not ill-appearing.   HENT:      Head: Normocephalic and atraumatic.      Mouth/Throat:      Mouth: Mucous membranes are dry.      Pharynx: Oropharynx is clear.   Eyes:      Extraocular Movements: Extraocular movements intact.      Pupils: Pupils are equal, round, and reactive to light.   Cardiovascular:      Rate and Rhythm: Regular rhythm. Tachycardia present.      Pulses: Normal pulses.      Heart sounds: Normal heart sounds.   Pulmonary:      Effort: Respiratory distress present.      Breath sounds: Examination of the right-middle field reveals decreased breath sounds. Examination of the right-lower field reveals decreased breath sounds. Decreased breath sounds present.      Comments: Patient requiring high flow nasal cannula at 11 L/min  Abdominal:      General: Abdomen is flat. Bowel sounds are normal.      Palpations: Abdomen is soft.   Musculoskeletal:         General: Normal range of motion.      Cervical back: Normal range of motion and neck supple.   Skin:     General: Skin is warm and dry.      Capillary Refill: Capillary refill takes 2 to 3 seconds.   Neurological:      General: No focal deficit present.      Mental Status: He is oriented to person, place, and time.      Motor: Weakness present.   Psychiatric:         Thought Content: Thought content normal.          Judgment: Judgment normal.     Result Review    Result Review:  I have personally reviewed the results from the time of this admission to 3/9/2023 08:23 EST and agree with these findings:  []  Laboratory  []  Microbiology  []  Radiology  []  EKG/Telemetry   []  Cardiology/Vascular   []  Pathology  []  Old records  []  Other:  Most notable findings include:     CBC:      Lab 03/08/23  0033 03/07/23  0641   WBC 9.10 8.60   HEMOGLOBIN 15.7 13.5   HEMATOCRIT 49.5 42.9   PLATELETS 165 163   NEUTROS ABS 8.00* 7.10*   LYMPHS ABS 0.30* 0.40*   MONOS ABS 0.80 1.20*   EOS ABS 0.00 0.00   MCV 88.5 89.2     CMP:        Lab 03/08/23  0033 03/07/23  0641   SODIUM 138 137   POTASSIUM 4.0 4.1   CHLORIDE 96* 98   CO2 27.0 24.0   ANION GAP 15.0 15.0   BUN 51* 50*   CREATININE 0.77 0.85   EGFR 85.0 82.5   GLUCOSE 140* 89   CALCIUM 11.5* 10.6*   TOTAL PROTEIN  --  6.0   ALBUMIN  --  2.9*   GLOBULIN  --  3.1   ALT (SGPT)  --  32   AST (SGOT)  --  82*   BILIRUBIN  --  1.4*   ALK PHOS  --  89     Blood Culture   Date Value Ref Range Status   03/07/2023 No growth at 24 hours  Preliminary     No results found for: BCIDPCR, CXREFLEX, CSFCX, CULTURETIS  No results found for: CULTURES, HSVCX, URCX  No results found for: EYECULTURE, GCCX, HSVCULTURE, LABHSV  No results found for: LEGIONELLA, MRSACX, MUMPSCX, MYCOPLASCX  No results found for: NOCARDIACX, STOOLCX  No results found for: THROATCX, UNSTIMCULT, URINECX, CULTURE, VZVCULTUR  No results found for: VIRALCULTU, WOUNDCX      Assessment & Plan      Brief Patient Summary:  Deric Wylie is a 89 y.o. male who       ampicillin-sulbactam, 1.5 g, Intravenous, Q6H  ipratropium-albuterol, 3 mL, Nebulization, 4x Daily - RT  methylPREDNISolone sodium succinate, 40 mg, Intravenous, Q8H  sodium chloride, 10 mL, Intravenous, Q12H             Active Hospital Problems:  Active Hospital Problems    Diagnosis    • **Squamous cell carcinoma of lung, stage IV, right (HCC)    • Sepsis (HCC)    •  Postobstructive pneumonia    • Acute-on-chronic respiratory failure (HCC)    • Dehydration    • COPD with acute exacerbation (HCC)      Sepsis with acute organ dysfunction without septic shock  Postobstructive pneumonia  - WBC 8.6  - Lactic Acid 5.5, reflux pending   - Blood Cultures, neg  - Procalcitonin 0.8  - UA neg nitrate, leukocytes   - sputum culture   - Chest xray reviewed   - Cefepime given in ED, continue ampicillin   - Requiring high flow nasal cannula 11 L/min  - IVF 1,770 given in ED      Acute exacerbation of chronic obstructive pulmonary disease  - Chest xray reviewed  - ABG ordered   - EKG ordered   - BNP 24,661.0  - 80 mg Solu-Medrol given in ED  - Solu-medrol 40 mg q8   - Duoneb prn  - tessalon prn   - Requiring high flow nasal cannula 15 L/min  - Pulmonology consulted >> considering Pleurx catheter thoracic surgery consulted  - Hold aspirin until cleared by thoracic surgery    CHF acute exacerbation  -lasix IV  -monitor bnp-24,000  -echo P       Dehydration  - Creatinine 0.85, BUN 50, eGFR 82.5   - monitor cmp      Squamous cell carcinoma of lung stage IV, right  - completed palliative radiation to lung mass (09/23/22)  - Oncology Consulted  - Palliative care consult to discuss goals of treatment     Hypercalcemia likely secondary to lung ca  -check pth  -lasix IV  -monitor calcium    DVT prophylaxis:  Mechanical DVT prophylaxis orders are present.    CODE STATUS:    Medical Intervention Limits: NO intubation (DNI)  Code Status (Patient has no pulse and is not breathing): No CPR (Do Not Attempt to Resuscitate)  Medical Interventions (Patient has pulse or is breathing): Limited Support      Disposition:  I expect patient to be discharged nh.    I have utilized all available, immediate resources to obtain, update, or review the patient's current medications including all prescriptions, over-the-counter products, herbals, cannabis/cannabidiol products, and vitamin.mineral/dietary (nutritional)  supplements.      Medical Intervention Limits: NO intubation (DNI)  Code Status (Patient has no pulse and is not breathing): No CPR (Do Not Attempt to Resuscitate)  Medical Interventions (Patient has pulse or is breathing): Limited Support    Next of kin of Power of :     Admission Status:  I believe this patient meets admit status.    Hospital Medicine Quality Measures for Heart Failure:  The patient has a history of heart transplant or LVAD. no          This patient has been examined wearing appropriate Personal Protective Equipment and discussed with rn. 03/09/23      Electronically signed by Jose Villasenor MD, 03/09/23, 08:23 EST.  [facility]ist Team

## 2023-03-09 NOTE — PLAN OF CARE
Goal Outcome Evaluation:  Plan of Care Reviewed With: patient   Pt has slept all night.  No c/o any pain or discomfort.  Pt received antibiotics tonight. F/c had approx 300 out, yellow urine.

## 2023-03-10 NOTE — PLAN OF CARE
Goal Outcome Evaluation:  Plan of Care Reviewed With: patient            Pt currently resting abed. Pt to have pleurx catheter placed later today. Pt VSS will cont to monitor.

## 2023-03-10 NOTE — DISCHARGE SUMMARY
[facility]  DISCHARGE SUMMARY    Patient Name: Deric Wylie  : 1933  MRN: [id number]    Date of Admission: 3/7/2023  Discharge Diagnosis: Sepsis   Date of Discharge: 3/10/23  Primary Care Physician: Bernardo Singh MD    Presenting Problem:   Dehydration [E86.0]  Acute exacerbation of chronic obstructive pulmonary disease (COPD) (HCC) [J44.1]  Postobstructive pneumonia [J18.9]  Squamous cell carcinoma of lung, stage IV, right (HCC) [C34.91]  Sepsis with acute organ dysfunction without septic shock, due to unspecified organism, unspecified type ([facility]) [A41.9, R65.20]    Active and Resolved Hospital Problems:  Active Hospital Problems    Diagnosis POA   • **Squamous cell carcinoma of lung, stage IV, right (HCC) [C34.91] Yes   • Severe malnutrition (HCC) [E43] Yes   • Sepsis (HCC) [A41.9] Yes   • Postobstructive pneumonia [J18.9] Yes   • Acute-on-chronic respiratory failure (HCC) [J96.20] Yes   • Malignant pleural effusion [J91.0] Unknown   • Dehydration [E86.0] Yes   • COPD with acute exacerbation (HCC) [J44.1] Yes      Resolved Hospital Problems   No resolved problems to display.     Sepsis with acute organ dysfunction without septic shock  Postobstructive pneumonia  - WBC 8.6  - Lactic Acid 5.5, reflux pending   - Blood Cultures, neg  - Procalcitonin 0.8  - UA neg nitrate, leukocytes   - sputum culture   - Chest xray reviewed   - Cefepime given in ED, continue ampicillin   - Requiring high flow nasal cannula 11 L/min  - IVF 1,770 given in ED      Acute exacerbation of chronic obstructive pulmonary disease  - Chest xray reviewed  - ABG ordered   - EKG ordered   - BNP 24,661.0  - 80 mg Solu-Medrol given in ED  - Solu-medrol 40 mg q8   - Duoneb prn  - tessalon prn   - Requiring high flow nasal cannula 15 L/min  - Pulmonology consulted >Due to recurrent pleural effusion requiring frequent thoracentesis, thoracic surgery were consulted and they plan to do  Pleurx catheter  Titrate oxygen, currently requiring 15 L high flow  - Hold aspirin until cleared by thoracic surgery     CHF acute exacerbation  -lasix IV  -monitor bnp-24,000  -echo P      Dehydration  - Creatinine 0.85, BUN 50, eGFR 82.5   - monitor cmp      Squamous cell carcinoma of lung stage IV, right  - completed palliative radiation to lung mass (09/23/22)  - Oncology Consulted  - Palliative care consult to discuss goals of treatment      Hypercalcemia likely secondary to lung ca  -check pth  -lasix IV  -monitor calcium     Hospital Course     History of Present Illness: Deric Wylie is a 89 y.o. male with past medical history of stage IV lung cancer who presented to [facility] on 3/7/2023 complaining of poor oral intake including liquids for the last 3 days and feeling weak all over for the past week.  He reported to the ED provider a productive cough with green sputum with some blood flecks.  He complains of left pleuritic chest discomfort.  Family reported to the ED provider the patient was confused.  No family present at bedside during assessment.  Patient able to answer questions appropriately and provide information when asked.     In the ED, chest x-ray showed No improvement in appearance of the right hemithorax with moderate right pleural effusion and probable atelectasis right lung in addition to what could reflect tumor along the superior mediastinum on the right. More prominent interstitial changes within the left lung that may be due to edema or inflammatory infectious process or possibly lymphangitic spread of tumor. Emphysematous changes. Cardiomegaly. Atherosclerotic vascular calcification. All vitals stable on admission except heart rate 111, respirations 28, SPO2 89% on 11 L high flow nasal cannula. All labs unremarkable except HS troponin 45, proBNP 24,661, BUN 50, lactic 5.5.  Flu and COVID negative. Patient received albuterol sulfate inhaler, 2 g cefepime, 80 mg Solu-Medrol,  1770 mL normal saline bolus in ED. Hospitalist was contacted to admit patient for further care and management.      3/8/23 patient seen and examined in bed NAD, says feels better, still having significant dyspnea on 15 liters of oxygen, FINA RN patient with urinary retention, will consult urology and place fountain catheter.  3/9/23 seen in bed NAD, c/o weakness fatigue, dyspnea on 15 lts, FINA RN  3/10/23 still short of breath on 15 lts, will dc home with hospice, Fina RN    condition on dc guarded.    DISCHARGE Follow Up Recommendations for labs and diagnostics:   follow with pcp in one week    Reasons For Change In Medications and Indications for New Medications:  Lasix, prednisone, duoneb, losartan, coreg  Day of Discharge     Vital Signs:  Temp:  [96.7 °F (35.9 °C)-98.1 °F (36.7 °C)] 97.1 °F (36.2 °C)  Heart Rate:  [104-111] 104  Resp:  [17-27] 19  BP: (128-140)/(67-89) 137/83  Flow (L/min):  [15] 15    Physical Exam Constitutional:       General: He is sleeping.      Appearance: He is not ill-appearing.   HENT:      Head: Normocephalic and atraumatic.      Mouth/Throat:      Mouth: Mucous membranes are dry.      Pharynx: Oropharynx is clear.   Eyes:      Extraocular Movements: Extraocular movements intact.      Pupils: Pupils are equal, round, and reactive to light.   Cardiovascular:      Rate and Rhythm: Regular rhythm. Tachycardia present.      Pulses: Normal pulses.      Heart sounds: Normal heart sounds.   Pulmonary:      Effort: Respiratory distress present.      Breath sounds: Examination of the right-middle field reveals decreased breath sounds. Examination of the right-lower field reveals decreased breath sounds. Decreased breath sounds present.      Comments: Patient requiring high flow nasal cannula at 11 L/min  Abdominal:      General: Abdomen is flat. Bowel sounds are normal.      Palpations: Abdomen is soft.   Musculoskeletal:         General: Normal range of motion.      Cervical back: Normal range of  motion and neck supple.   Skin:     General: Skin is warm and dry.      Capillary Refill: Capillary refill takes 2 to 3 seconds.   Neurological:      General: No focal deficit present.      Mental Status: He is oriented to person, place, and time.      Motor: Weakness present.   Psychiatric:         Thought Content: Thought content normal.         Judgment: Judgment normal.     Pertinent  and/or Most Recent Results     LAB RESULTS:      Lab 03/08/23  0033 03/07/23  1007 03/07/23  0641 03/07/23  0547   WBC 9.10  --  8.60  --    HEMOGLOBIN 15.7  --  13.5  --    HEMATOCRIT 49.5  --  42.9  --    PLATELETS 165  --  163  --    NEUTROS ABS 8.00*  --  7.10*  --    LYMPHS ABS 0.30*  --  0.40*  --    MONOS ABS 0.80  --  1.20*  --    EOS ABS 0.00  --  0.00  --    MCV 88.5  --  89.2  --    PROCALCITONIN  --   --  0.80*  --    LACTATE  --  3.2*  --  5.5*         Lab 03/08/23  0033 03/07/23  0641   SODIUM 138 137   POTASSIUM 4.0 4.1   CHLORIDE 96* 98   CO2 27.0 24.0   ANION GAP 15.0 15.0   BUN 51* 50*   CREATININE 0.77 0.85   EGFR 85.0 82.5   GLUCOSE 140* 89   CALCIUM 11.5* 10.6*         Lab 03/07/23  0641   TOTAL PROTEIN 6.0   ALBUMIN 2.9*   GLOBULIN 3.1   ALT (SGPT) 32   AST (SGOT) 82*   BILIRUBIN 1.4*   ALK PHOS 89         Lab 03/07/23  0641   PROBNP 24,661.0*   HSTROP T 45*                 Lab 03/07/23  0927   PH, ARTERIAL 7.363   PCO2, ARTERIAL 45.8   PO2 ART 62.4*   O2 SATURATION ART 90.5*   FIO2 60   HCO3 ART 26.0   BASE EXCESS ART 0.1     Brief Urine Lab Results  (Last result in the past 365 days)      Color   Clarity   Blood   Leuk Est   Nitrite   Protein   CREAT   Urine HCG        03/08/23 1435 Red  Comment: Any Substance that causes an abnormal urine color can alter the accuracy of the chemical reactions.   Turbid   Large (3+)   --  Comment: The Leukoesterase test cannot be performed due to the centrifugation of the specimen.      Negative   100 mg/dL (2+)               Microbiology Results (last 10 days)     Procedure  Component Value - Date/Time    Blood Culture - Blood, Arm, Right [[id number]]  (Normal) Collected: 03/07/23 0641    Lab Status: Preliminary result Specimen: Blood from Arm, Right Updated: 03/10/23 0645     Blood Culture No growth at 3 days    COVID-19 and FLU A/B PCR - Swab, Nasopharynx [[id number]]  (Normal) Collected: 03/07/23 0553    Lab Status: Final result Specimen: Swab from Nasopharynx Updated: 03/07/23 0617     COVID19 Not Detected     Influenza A PCR Not Detected     Influenza B PCR Not Detected    Narrative:      Fact sheet for providers: https://www.fda.gov/media/108437/download    Fact sheet for patients: https://www.fda.gov/media/400227/download    Test performed by PCR.        XR Chest 1 View    Result Date: 3/7/2023  Impression: Impression: 1.No improvement in appearance of the right hemithorax with moderate right pleural effusion and probable atelectasis right lung in addition to what could reflect tumor along the superior mediastinum on the right. 2.More prominent interstitial changes within the left lung that may be due to edema or inflammatory infectious process or possibly lymphangitic spread of tumor. 3.Emphysematous changes. 4.Cardiomegaly 5.Atherosclerotic vascular calcification. Electronically Signed: Daniel Cano  3/7/2023 6:50 AM EST  Workstation ID: [id number]    XR Chest 1 View    Result Date: 2/22/2023  Impression: Impression: 1.The remains a moderate right pleural effusion with pulmonary changes within the aerated portions of the right lung that may relate to atelectasis or pneumonia. 2. There remains a mass in the superior mediastinal area on the right 3.Suggested scarring left upper lobe. There is a small left pleural effusion. 4.Emphysematous changes are present. 5.There is a scoliosis. Electronically Signed: Daniel Cano  2/22/2023 1:30 PM EST  Workstation ID: [id number]    US Thoracentesis    Result Date: 2/22/2023  Impression: 1. Right thoracentesis yielding 3 L of clear fluid. There was  more fluid within the chest, however I did not want to remove more fluid for the fear of reexpansion pulmonary edema.  Electronically Signed: Tye Oconnell  2/22/2023 1:28 PM EST  Workstation ID: [id number]                Labs Pending at Discharge:  Pending Labs     Order Current Status    Blood Culture - Blood, Arm, Right Preliminary result          Procedures Performed  Procedure(s):  PLEURX CATHETER INSERTION         Consults:   Consults     Date and Time Order Name Status Description    3/9/2023  9:29 AM Inpatient Thoracic Surgery Consult Completed     3/8/2023  1:20 PM Inpatient Urology Consult Completed     3/7/2023  8:56 AM Inpatient Palliative Care MD Consult Completed     3/7/2023  8:36 AM Inpatient Pulmonology Consult Completed         Assessment     Acute respiratory failure with hypoxia  Moderate recurrent malignant right pleural effusion  Obstructive pneumonia     Squamous cell carcinoma of lung, stage IV, right (HCC)   -Diagnosed 9/14/2022 with CT-guided biopsy  -Completed palliative radiation  -Currently receiving immunotherapy     2/22/2023 status post right thoracentesis, 3 L removed     9/14/2022 required chest catheter placement, removed 9/17/2022  -cytology positive with malignancy consistent with non-small cell carcinoma     Elevated proBNP, 24,661     Chronic obstructive pulmonary disease  Severe emphysema     Recommendations:     Due to recurrent pleural effusion requiring frequent thoracentesis, thoracic surgery were consulted and they plan to do Pleurx catheter  Titrate oxygen, currently requiring 15 L high flow     Antibiotics  Solu-Medrol 40 mg every 12 hours  Bronchodilator     Chest CT 3/7/2023                                Discharge Details        Discharge Medications      New Medications      Instructions Start Date   carvedilol 3.125 MG tablet  Commonly known as: Coreg   3.125 mg, Oral, 2 Times Daily With Meals      furosemide 20 MG tablet  Commonly known as: LASIX   40 mg, Oral,  Daily      ipratropium-albuterol 0.5-2.5 mg/3 ml nebulizer  Commonly known as: DUO-NEB   3 mL, Nebulization, 4 Times Daily - RT      losartan 25 MG tablet  Commonly known as: Cozaar   12.5 mg, Oral, Daily      predniSONE 10 MG tablet  Commonly known as: DELTASONE   Take 4 tablets by mouth Daily for 4 days, THEN 3 tablets Daily for 4 days, THEN 2 tablets Daily for 4 days, THEN 1 tablet Daily for 4 days.   Start Date: March 10, 2023        Continue These Medications      Instructions Start Date   aspirin 81 MG chewable tablet   81 mg, Oral, Daily      ondansetron 8 MG tablet  Commonly known as: Zofran   8 mg, Oral, 3 Times Daily PRN      polyethylene glycol 17 g packet  Commonly known as: MIRALAX   17 g, Oral, Daily      traMADol 50 MG tablet  Commonly known as: ULTRAM   50 mg, Oral, Every 6 Hours PRN             No Known Allergies      Discharge Disposition:   Hospice/Home    Diet:  Hospital:  Diet Order   Procedures   • NPO Diet NPO Type: Sips with Meds         Discharge Activity:   Activity Instructions     Gradually Increase Activity Until at Pre-Hospitalization Level              CODE STATUS:  Code Status and Medical Interventions:   Ordered at: 03/07/23 1656     Medical Intervention Limits:    NO intubation (DNI)     Code Status (Patient has no pulse and is not breathing):    No CPR (Do Not Attempt to Resuscitate)     Medical Interventions (Patient has pulse or is breathing):    Limited Support     I have utilized all available, immediate resources to obtain, update, or review the patient's current medications including all prescriptions, over-the-counter products, herbals, cannabis/cannabidiol products, and vitamin.mineral/dietary (nutritional) supplements.      Medical Intervention Limits: NO intubation (DNI)  Code Status (Patient has no pulse and is not breathing): No CPR (Do Not Attempt to Resuscitate)  Medical Interventions (Patient has pulse or is breathing): Limited Support    Next of kin of Power of  :   I confirmed that the patient's Advanced Care Plan is present, code status is documented, or surrogate decision maker is listed in the patient's medical record: YES  Hospice care is currently being provided or has been provided this calendar year: YES      Admission Status:  I believe this patient meets impatient status.    Hospital Medicine Quality Measures for Heart Failure:  The patient has a history of heart transplant or LVAD. YES    The patient has current prior documentation of left ventricular ejection fraction less than 40%.  The patient was prescribed or IS already taking an angiotensin-converting enzyme (ACE) Inhibitor, or angiotensin receptor blocker (ARB): YES   No    The patient was prescribed or is already taking a beta-blocker YES   No        Future Appointments   Date Time Provider Department Center   3/10/2023  2:30 PM [facility] CT 3 [facility]   3/29/2023  1:00 PM INF ROOM 23 - CHAIR [facility]       Additional Instructions for the Follow-ups that You Need to Schedule     Discharge Follow-up with PCP   As directed       Currently Documented PCP:    Bernardo Singh MD    PCP Phone Number:    [phone]     Follow Up Details: 1 week               Time spent on Discharge including face to face service:  34 minutes    This patient has been examined wearing appropriate Personal Protective Equipment and discussed with rn. 03/10/23      Signature: Electronically signed by Jose Villasenor MD, 03/10/23, 1:08 PM EST.

## 2023-03-10 NOTE — ANESTHESIA PREPROCEDURE EVALUATION
Anesthesia Evaluation     Patient summary reviewed and Nursing notes reviewed   NPO Solid Status: > 8 hours             Airway   Mallampati: I  TM distance: >3 FB  Neck ROM: full  No difficulty expected  Dental - normal exam     Pulmonary - normal exam   (+) pleural effusion, lung cancer, COPD, shortness of breath,   Cardiovascular - negative cardio ROS and normal exam        Neuro/Psych- negative ROS  GI/Hepatic/Renal/Endo - negative ROS     Musculoskeletal (-) negative ROS    Abdominal  - normal exam    Bowel sounds: normal.   Substance History - negative use     OB/GYN negative ob/gyn ROS         Other          Other Comment: Medical History  Current as of 03/09/23 2011  History Comments History Comments  COPD (chronic obstructive pulmonary disease) (HCC)  Cancer (HCC) Right lung    Surgical History  Current as of 03/09/23 2011  ABDOMINAL AORTIC ANEURYSM REPAIR APPENDECTOMY                      Anesthesia Plan    ASA 4     MAC     intravenous induction     Anesthetic plan, risks, benefits, and alternatives have been provided, discussed and informed consent has been obtained with: patient.    Plan discussed with CRNA.        CODE STATUS:    Medical Intervention Limits: NO intubation (DNI)  Code Status (Patient has no pulse and is not breathing): No CPR (Do Not Attempt to Resuscitate)  Medical Interventions (Patient has pulse or is breathing): Limited Support

## 2023-03-10 NOTE — PLAN OF CARE
Problem: Adult Inpatient Plan of Care  Goal: Plan of Care Review  Outcome: Met  Flowsheets (Taken 3/10/2023 1331)  Progress: no change  Plan of Care Reviewed With: patient  Goal: Patient-Specific Goal (Individualized)  Outcome: Met  Goal: Absence of Hospital-Acquired Illness or Injury  Outcome: Met  Intervention: Identify and Manage Fall Risk  Recent Flowsheet Documentation  Taken 3/10/2023 1053 by Petra Tapia LPN  Safety Promotion/Fall Prevention: safety round/check completed  Goal: Optimal Comfort and Wellbeing  Outcome: Met  Goal: Readiness for Transition of Care  Outcome: Met     Problem: Skin Injury Risk Increased  Goal: Skin Health and Integrity  Outcome: Met     Problem: Fall Injury Risk  Goal: Absence of Fall and Fall-Related Injury  Outcome: Met  Intervention: Promote Injury-Free Environment  Recent Flowsheet Documentation  Taken 3/10/2023 1053 by Petra Tapia LPN  Safety Promotion/Fall Prevention: safety round/check completed   Goal Outcome Evaluation:  Plan of Care Reviewed With: patient        Progress: no change   Pt discharging home with [facility].

## 2023-03-10 NOTE — PROGRESS NOTES
Daily Progress Note          Assessment    Acute respiratory failure with hypoxia  Moderate recurrent malignant right pleural effusion  Obstructive pneumonia     Squamous cell carcinoma of lung, stage IV, right (HCC)   -Diagnosed 9/14/2022 with CT-guided biopsy  -Completed palliative radiation  -Currently receiving immunotherapy     2/22/2023 status post right thoracentesis, 3 L removed     9/14/2022 required chest catheter placement, removed 9/17/2022  -cytology positive with malignancy consistent with non-small cell carcinoma     Elevated proBNP, 24,661     Chronic obstructive pulmonary disease  Severe emphysema     Recommendations:     Due to recurrent pleural effusion requiring frequent thoracentesis, thoracic surgery were consulted and they plan to do Pleurx catheter  Titrate oxygen, currently requiring 15 L high flow     Antibiotics  Solu-Medrol 40 mg every 12 hours  Bronchodilator     Chest CT 3/7/2023                            CXR s/p thoracentesis 2/22/2023               LOS: 3 days     Subjective     Patient reports shortness of breath and cough    Objective     Vital signs for last 24 hours:  Vitals:    03/09/23 2008 03/10/23 0016 03/10/23 0400 03/10/23 0818   BP: 130/67 135/80 137/89 137/83   BP Location: Left arm Left arm  Left arm   Patient Position: Lying Lying  Lying   Pulse: 111 110 105 104   Resp: 20 24 17 19   Temp: 96.7 °F (35.9 °C) 97.5 °F (36.4 °C) 98.1 °F (36.7 °C) 97.1 °F (36.2 °C)   TempSrc: Axillary Oral Oral Oral   SpO2: 100% 94% (!) 88% 90%   Weight:       Height:           Intake/Output last 3 shifts:  I/O last 3 completed shifts:  In: 360 [P.O.:360]  Out: 3850 [Urine:3850]  Intake/Output this shift:  I/O this shift:  In: 0   Out: 450 [Urine:450]      Radiology  Imaging Results (Last 24 Hours)     ** No results found for the last 24 hours. **          Labs:  Results from last 7 days   Lab Units 03/08/23  0033   WBC 10*3/mm3 9.10   HEMOGLOBIN g/dL 15.7   HEMATOCRIT % 49.5   PLATELETS  10*3/mm3 165     Results from last 7 days   Lab Units 03/08/23  0033 03/07/23  0641   SODIUM mmol/L 138 137   POTASSIUM mmol/L 4.0 4.1   CHLORIDE mmol/L 96* 98   CO2 mmol/L 27.0 24.0   BUN mg/dL 51* 50*   CREATININE mg/dL 0.77 0.85   CALCIUM mg/dL 11.5* 10.6*   BILIRUBIN mg/dL  --  1.4*   ALK PHOS U/L  --  89   ALT (SGPT) U/L  --  32   AST (SGOT) U/L  --  82*   GLUCOSE mg/dL 140* 89     Results from last 7 days   Lab Units 03/07/23  0927   PH, ARTERIAL pH units 7.363   PO2 ART mm Hg 62.4*   PCO2, ARTERIAL mm Hg 45.8   HCO3 ART mmol/L 26.0     Results from last 7 days   Lab Units 03/07/23  0641   ALBUMIN g/dL 2.9*     Results from last 7 days   Lab Units 03/07/23  0641   HSTROP T ng/L 45*                           Meds:   SCHEDULE  methylPREDNISolone sodium succinate, 40 mg, Intravenous, Q12H  sodium chloride, 10 mL, Intravenous, Q12H      Infusions     PRNs  •  acetaminophen **OR** acetaminophen **OR** acetaminophen  •  aluminum-magnesium hydroxide-simethicone  •  benzonatate  •  LORazepam  •  melatonin  •  Morphine  •  ondansetron **OR** ondansetron  •  sodium chloride  •  sodium chloride    Physical Exam:  General Appearance:  Alert   HEENT:  Normocephalic, without obvious abnormality, Conjunctiva/corneas clear,.   Nares normal, no drainage     Neck:  Supple, symmetrical, trachea midline.   Lungs /Chest wall:   Decreased breath sounds on right side with scattered crackles, respirations unlabored, symmetrical wall movement.     Heart:  Regular rate and rhythm, S1 S2 normal  Abdomen: Soft, non-tender, no masses, no organomegaly.    Extremities: No edema, no clubbing or cyanosis     ROS  Constitutional: Negative for chills, fever and positive for malaise/fatigue.   HENT: Negative.    Eyes: Negative.    Cardiovascular: Negative.    Respiratory: Positive for cough and shortness of breath.    Skin: Negative.    Musculoskeletal: Negative.    Gastrointestinal: Negative.    Genitourinary: Negative.    Neurological:  Negative.    Psychiatric/Behavioral: Negative.      I reviewed the recent clinical results    Much of this encounter note is an electronic transcription/translation of spoken language to printed text using Dragon Software which might include inadvertent errors in transcription.

## 2023-03-10 NOTE — OP NOTE
PLEURX CATHETER INSERTION  Procedure Report    Patient Name:  Deric Wylie  YOB: 1933    Date of Surgery:  3/10/2023     Indications: Stage IV lung cancer.  Recurrent right-sided pleural effusions.  This is for hospice/comfort care to help with his shortness of breath and recurrent effusions.    Pre-op Diagnosis:   1.  Squamous cell carcinoma of the right upper lobe, stage IV  2.  Recurrent pleural effusion       Postoperative diagnosis:  1.  Squamous cell carcinoma of the right upper lobe, stage IV  2.  Recurrent pleural effusion    Procedure/CPT® Codes:      Procedure(s):  1. Right VATS  2. PLEURX CATHETER INSERTION    Staff:  Surgeon(s):  Lio Mancilla MD PhD      Anesthesia: Monitored Anesthesia Care    Estimated Blood Loss: 0.2 mL    Implants:    Nothing was implanted during the procedure    Specimen:          None      Findings: Approximately 2 L of serous pleural fluid was evacuated from his pleural space.    Complications: None  .[facility][[id number]    Description of Procedure:       Deric Wylie was brought to the operating room and placed on the operating table in the supine position.  Blood pressure, EKG, pulse oximetry, and airway were monitored by the anesthesia service.  Supplemental oxygen and intravenous sedation were administered by anesthesia.  Once the patient was adequately sedated a roll was placed along the right flank to elevate the chest 30°.  Right shoulder and arm were supported. right lateral chest wall was prepped and draped in usual sterile manner.  An area in the fifth intercostal space mid axillary line was infiltrated with 1% Xylocaine.  A small incision was made.  14-gauge Angiocath was used to penetrate the pleural space.  Free flow of serosanguineous fluid was obtained.  Guidewire was threaded through the Angiocath and directed to the apex of the chest.  Subcutaneous tunnel was infiltrated with 1% Xylocaine.  Pleurx catheter was brought through this tunnel  to the insertion site.  Dilator and introducer were passed over the guidewire.    Next a 5 mm 30 degree VATS scope was inserted through the introducer sheath.  Visualization of the pleural space occurred.  He had a large amount of pleural fluid and a collapsed right lower lobe noted.  Pleurx catheter was then threaded through the introducer. Once the catheter was in good position it was connected to suction. 2 L of serosanguineous fluid was aspirated from the pleural space and the catheter was then irrigated with normal saline.  Catheter was capped off.  Catheter was secured to the chest wall with a suture 0 silk.  The wounds were closed in layers with 4-0 Monocryl.  Dry sterile dressing was applied.  Patient was awakened in the operating room and transported to seated recovery in satisfactory condition having tolerated the procedure well.  Sponge instrument and needle counts were correct times to 2 at the end of procedure.    Lio Mancilla MD PhD     Date: 3/10/2023  Time: 12:49 EST

## 2023-03-10 NOTE — PROGRESS NOTES
[facility] Medicine Services Daily Progress Note    Patient Name: Deric Wylie  : 1933  MRN: [id number]  Primary Care Physician:  Bernardo Singh MD  Date of admission: 3/7/2023      Subjective      Chief Complaint: Generalized weakness     History of Present Illness: Deric Wylie is a 89 y.o. male with past medical history of stage IV lung cancer who presented to [facility] on 3/7/2023 complaining of poor oral intake including liquids for the last 3 days and feeling weak all over for the past week.  He reported to the ED provider a productive cough with green sputum with some blood flecks.  He complains of left pleuritic chest discomfort.  Family reported to the ED provider the patient was confused.  No family present at bedside during assessment.  Patient able to answer questions appropriately and provide information when asked.     In the ED, chest x-ray showed No improvement in appearance of the right hemithorax with moderate right pleural effusion and probable atelectasis right lung in addition to what could reflect tumor along the superior mediastinum on the right. More prominent interstitial changes within the left lung that may be due to edema or inflammatory infectious process or possibly lymphangitic spread of tumor. Emphysematous changes. Cardiomegaly. Atherosclerotic vascular calcification. All vitals stable on admission except heart rate 111, respirations 28, SPO2 89% on 11 L high flow nasal cannula. All labs unremarkable except HS troponin 45, proBNP 24,661, BUN 50, lactic 5.5.  Flu and COVID negative. Patient received albuterol sulfate inhaler, 2 g cefepime, 80 mg Solu-Medrol, 1770 mL normal saline bolus in ED. Hospitalist was contacted to admit patient for further care and management.         3/8/23 patient seen and examined in bed NAD, says feels better, still having significant dyspnea on 15 liters of oxygen, FINA RN patient with urinary retention,  will consult urology and place fountain catheter.  3/9/23 seen in bed NAD, c/o weakness fatigue, dyspnea on 15 lts, FINA RN  3/10/23 still short of breath on 15 lts, will dc home with hospice, Fina TREADWELL     ROS Review of Systems   Constitutional: Positive for malaise/fatigue.   Respiratory: Positive for cough and sputum production.    Gastrointestinal: Negative for nausea and vomiting.   Neurological: Positive for weakness. Negative for focal weakness.       Objective      Vitals:   Temp:  [96.7 °F (35.9 °C)-98.1 °F (36.7 °C)] 97.1 °F (36.2 °C)  Heart Rate:  [104-111] 104  Resp:  [17-27] 19  BP: (128-140)/(67-89) 137/83  Flow (L/min):  [15] 15    Physical Exam Constitutional:       General: He is sleeping.      Appearance: He is not ill-appearing.   HENT:      Head: Normocephalic and atraumatic.      Mouth/Throat:      Mouth: Mucous membranes are dry.      Pharynx: Oropharynx is clear.   Eyes:      Extraocular Movements: Extraocular movements intact.      Pupils: Pupils are equal, round, and reactive to light.   Cardiovascular:      Rate and Rhythm: Regular rhythm. Tachycardia present.      Pulses: Normal pulses.      Heart sounds: Normal heart sounds.   Pulmonary:      Effort: Respiratory distress present.      Breath sounds: Examination of the right-middle field reveals decreased breath sounds. Examination of the right-lower field reveals decreased breath sounds. Decreased breath sounds present.      Comments: Patient requiring high flow nasal cannula at 11 L/min  Abdominal:      General: Abdomen is flat. Bowel sounds are normal.      Palpations: Abdomen is soft.   Musculoskeletal:         General: Normal range of motion.      Cervical back: Normal range of motion and neck supple.   Skin:     General: Skin is warm and dry.      Capillary Refill: Capillary refill takes 2 to 3 seconds.   Neurological:      General: No focal deficit present.      Mental Status: He is oriented to person, place, and time.      Motor: Weakness  present.   Psychiatric:         Thought Content: Thought content normal.         Judgment: Judgment normal.     Result Review    Result Review:  I have personally reviewed the results from the time of this admission to 3/10/2023 09:12 EST and agree with these findings:  []  Laboratory  []  Microbiology  []  Radiology  []  EKG/Telemetry   []  Cardiology/Vascular   []  Pathology  []  Old records  []  Other:  Most notable findings include:     CBC:      Lab 03/08/23  0033 03/07/23  0641   WBC 9.10 8.60   HEMOGLOBIN 15.7 13.5   HEMATOCRIT 49.5 42.9   PLATELETS 165 163   NEUTROS ABS 8.00* 7.10*   LYMPHS ABS 0.30* 0.40*   MONOS ABS 0.80 1.20*   EOS ABS 0.00 0.00   MCV 88.5 89.2     CMP:        Lab 03/08/23  0033 03/07/23  0641   SODIUM 138 137   POTASSIUM 4.0 4.1   CHLORIDE 96* 98   CO2 27.0 24.0   ANION GAP 15.0 15.0   BUN 51* 50*   CREATININE 0.77 0.85   EGFR 85.0 82.5   GLUCOSE 140* 89   CALCIUM 11.5* 10.6*   TOTAL PROTEIN  --  6.0   ALBUMIN  --  2.9*   GLOBULIN  --  3.1   ALT (SGPT)  --  32   AST (SGOT)  --  82*   BILIRUBIN  --  1.4*   ALK PHOS  --  89     Blood Culture   Date Value Ref Range Status   03/07/2023 No growth at 24 hours  Preliminary     No results found for: BCIDPCR, CXREFLEX, CSFCX, CULTURETIS  No results found for: CULTURES, HSVCX, URCX  No results found for: EYECULTURE, GCCX, HSVCULTURE, LABHSV  No results found for: LEGIONELLA, MRSACX, MUMPSCX, MYCOPLASCX  No results found for: NOCARDIACX, STOOLCX  No results found for: THROATCX, UNSTIMCULT, URINECX, CULTURE, VZVCULTUR  No results found for: VIRALCULTU, WOUNDCX      Assessment & Plan      Brief Patient Summary:  Deric Wylie is a 89 y.o. male who       methylPREDNISolone sodium succinate, 40 mg, Intravenous, Q12H  sodium chloride, 10 mL, Intravenous, Q12H             Active Hospital Problems:  Active Hospital Problems    Diagnosis    • **Squamous cell carcinoma of lung, stage IV, right (HCC)    • Severe malnutrition (HCC)    • Sepsis (HCC)    •  Postobstructive pneumonia    • Acute-on-chronic respiratory failure (HCC)    • Malignant pleural effusion    • Dehydration    • COPD with acute exacerbation (HCC)      Sepsis with acute organ dysfunction without septic shock  Postobstructive pneumonia  - WBC 8.6  - Lactic Acid 5.5, reflux pending   - Blood Cultures, neg  - Procalcitonin 0.8  - UA neg nitrate, leukocytes   - sputum culture   - Chest xray reviewed   - Cefepime given in ED, continue ampicillin   - Requiring high flow nasal cannula 11 L/min  - IVF 1,770 given in ED      Acute exacerbation of chronic obstructive pulmonary disease  - Chest xray reviewed  - ABG ordered   - EKG ordered   - BNP 24,661.0  - 80 mg Solu-Medrol given in ED  - Solu-medrol 40 mg q8   - Duoneb prn  - tessalon prn   - Requiring high flow nasal cannula 15 L/min  - Pulmonology consulted >> considering Pleurx catheter thoracic surgery consulted  - Hold aspirin until cleared by thoracic surgery    CHF acute exacerbation  -lasix IV  -monitor bnp-24,000  -echo P       Dehydration  - Creatinine 0.85, BUN 50, eGFR 82.5   - monitor cmp      Squamous cell carcinoma of lung stage IV, right  - completed palliative radiation to lung mass (09/23/22)  - Oncology Consulted  - Palliative care consult to discuss goals of treatment     Hypercalcemia likely secondary to lung ca  -check pth  -lasix IV  -monitor calcium    DVT prophylaxis:  Mechanical DVT prophylaxis orders are present.    CODE STATUS:    Medical Intervention Limits: NO intubation (DNI)  Code Status (Patient has no pulse and is not breathing): No CPR (Do Not Attempt to Resuscitate)  Medical Interventions (Patient has pulse or is breathing): Limited Support      Disposition:  I expect patient to be discharged nh.    I have utilized all available, immediate resources to obtain, update, or review the patient's current medications including all prescriptions, over-the-counter products, herbals, cannabis/cannabidiol products, and  vitamin.mineral/dietary (nutritional) supplements.      Medical Intervention Limits: NO intubation (DNI)  Code Status (Patient has no pulse and is not breathing): No CPR (Do Not Attempt to Resuscitate)  Medical Interventions (Patient has pulse or is breathing): Limited Support    Next of kin of Power of :     Admission Status:  I believe this patient meets admit status.    Hospital Medicine Quality Measures for Heart Failure:  The patient has a history of heart transplant or LVAD. no          This patient has been examined wearing appropriate Personal Protective Equipment and discussed with rn. 03/10/23      Electronically signed by Jose Villasenor MD, 03/10/23, 09:12 EST.  [facility] Hospitalist Team

## 2023-03-10 NOTE — CASE MANAGEMENT/SOCIAL WORK
Continued Stay Note   Octaviano     Patient Name: Deric Wylie  MRN: [id number]  Today's Date: 3/10/2023    Admit Date: 3/7/2023    Plan: D/C plan: Home with [facility] today.   Discharge Plan     Row Name 03/10/23 1655       Plan    Plan D/C plan: Home with [facility] today.    Patient/Family in Agreement with Plan yes    Plan Comments Per Yenny, liaison for [facility], pt is accepted and the plan is to go home and admit at home. Yenny in contact with pt's friend, DARLYN (Nii) who is caregiver at the home. DARLYN is agreeable to pt coming home. [facility] having equipment delivered today. Yenny also met with pt at bedside to confirm he is ok to return home with DARLYN as his caregiver. Pt is agreeable. EMS unable to transport today. Yenny arranged for oxygen for transport and DALRYN is coming to  pt and take him home.                   Phone communication or documentation only - no physical contact with patient or family.      Med Gonzales RN

## 2023-03-11 NOTE — PROGRESS NOTES
Hematology/Oncology Inpatient Progress Note    PATIENT NAME: Deric Wylie  : 1933  MRN: [id number]    CHIEF COMPLAINT: Generalized Weakness    HISTORY OF PRESENT ILLNESS:      Deric Wylie is a 89 y.o. male who presented to [facility] on 3/7/2023 with complaints of weakness, fever, poor oral intake along with productive cough of green mucus with tinge of blood and pleuritic left chest pain. In the ER, he was found to have right pleural effusion, and left lung inflammatory infectious process on CXR as well as lactate of 5.5 with elevated heart rate and respiratory rate along with need for 11 liters oxygen to maintain saturation of 89% and BNP 24,661. Orders for urinalysis, blood cultures, respiratory cultures sent.  The patient was given steroid, albuterol, fluids and started on IV antibiotics, then admitted for further management.     CXR 3/7/2023- moderate right pleural effusion and probable atelectasis right lung-could reflect tumor along the superior mediastinum on the right,, interstitial changes left lung may be edema or inflammatory infectious process or possibly lymphatic spread of tumor, emphysematous changes     23  Hematology/Oncology was consulted for established patient with stage IV Squamous carcinoma RUL diagnosed with CT guided biopsy 22, PDL 1% and TMB high. An MRI brain at this time was negative for brain metastasis and he subsequently completed palliative radiation 2022. The patient did not want chemotherapy and was started on Keytruda 10/12/22, and completed cycle #4 22 with subsequent CT chest 12/15/22 showing mild decrease in mass RUL, multiple pulmonary nodules throughout the left lung, and large right pleural effusion. He underwent US thoracentesis 22 for increasing SOB with 3 liters fluid obtained and again on 23 with 3 liters removed from right lung. The patient now presents with weakness, poor oral intake and elevated lactate and  "procalcitonin along with productive cough of green mucus.  The patient remains on high flow oxygen while receiving antibiotic therapy.  Oncology will obtain CT chest after pleurx catheter to observe for progression.     He  has a past medical history of Cancer (HCC) and COPD (chronic obstructive pulmonary disease) (HCC).     PCP: Bernardo Singh MD    History of present illness was reviewed and is unchanged from the previous visit. 03/08/23    Subjective   Patient has ongoing shortness of breath. No other new symptoms    MEDICATIONS:    Scheduled Meds:  ipratropium-albuterol, 3 mL, Nebulization, 4x Daily - RT  methylPREDNISolone sodium succinate, 40 mg, Intravenous, Q12H  sodium chloride, 10 mL, Intravenous, Q12H       Continuous Infusions:  sodium chloride, 50 mL/hr       PRN Meds:  •  acetaminophen **OR** acetaminophen **OR** acetaminophen  •  aluminum-magnesium hydroxide-simethicone  •  benzonatate  •  LORazepam  •  melatonin  •  Morphine  •  ondansetron **OR** ondansetron  •  sodium chloride  •  sodium chloride     ALLERGIES:  No Known Allergies    Objective    VITALS:   /97 (BP Location: Left arm, Patient Position: Lying)   Pulse 109   Temp 97.7 °F (36.5 °C) (Oral)   Resp 20   Ht 182.9 cm (72\")   Wt 57.2 kg (126 lb 1.7 oz)   SpO2 99%   BMI 17.10 kg/m²     PHYSICAL EXAM: (performed by MD)  Physical Exam  Vitals and nursing note reviewed.   HENT:      Head: Normocephalic.      Mouth/Throat:      Mouth: Mucous membranes are dry.   Eyes:      Pupils: Pupils are equal, round, and reactive to light.   Cardiovascular:      Rate and Rhythm: Regular rhythm. Tachycardia present.   Pulmonary:      Effort: Respiratory distress present.      Comments: Decreased breath sounds  Oxygen 15 liters   Abdominal:      General: Bowel sounds are normal. There is no distension.      Palpations: There is no mass.      Tenderness: There is no abdominal tenderness.   Genitourinary:     Comments: Urinary " catheter  Musculoskeletal:         General: Normal range of motion.      Cervical back: Normal range of motion and neck supple.      Right lower leg: No edema.      Left lower leg: No edema.   Lymphadenopathy:      Cervical: No cervical adenopathy.   Skin:     General: Skin is warm and dry.      Findings: No erythema or rash.   Neurological:      General: No focal deficit present.      Mental Status: He is alert.      Motor: Weakness present.   Psychiatric:         Behavior: Behavior normal.           RECENT LABS:  Lab Results (last 24 hours)     Procedure Component Value Units Date/Time    Protime-INR [[id number]]  (Abnormal) Collected: 03/10/23 1544    Specimen: Blood Updated: 03/10/23 1623     Protime 14.4 Seconds      INR 1.43    CBC & Differential [[id number]]  (Abnormal) Collected: 03/10/23 1544    Specimen: Blood Updated: 03/10/23 1613    Narrative:      The following orders were created for panel order CBC & Differential.  Procedure                               Abnormality         Status                     ---------                               -----------         ------                     CBC Auto Differential[[id number]]        Abnormal            Final result                 Please view results for these tests on the individual orders.    CBC Auto Differential [[id number]]  (Abnormal) Collected: 03/10/23 1544    Specimen: Blood Updated: 03/10/23 1613     WBC 8.70 10*3/mm3      RBC 4.98 10*6/mm3      Hemoglobin 14.2 g/dL      Hematocrit 44.5 %      MCV 89.3 fL      MCH 28.5 pg      MCHC 32.0 g/dL      RDW 15.1 %      RDW-SD 46.8 fl      MPV 7.9 fL      Platelets 98 10*3/mm3      Neutrophil % 93.1 %      Lymphocyte % 1.6 %      Monocyte % 5.2 %      Eosinophil % 0.0 %      Basophil % 0.1 %      Neutrophils, Absolute 8.10 10*3/mm3      Lymphocytes, Absolute 0.10 10*3/mm3      Monocytes, Absolute 0.40 10*3/mm3      Eosinophils, Absolute 0.00 10*3/mm3      Basophils, Absolute 0.00 10*3/mm3      nRBC 0.1 /100  WBC     Blood Culture - Blood, Arm, Right [[id number]]  (Normal) Collected: 03/07/23 0641    Specimen: Blood from Arm, Right Updated: 03/10/23 0645     Blood Culture No growth at 3 days          PENDING RESULTS:     IMAGING REVIEWED:  XR Chest 1 View    Result Date: 3/10/2023  Impression: Decreased size of right pleural effusion and improved aeration of the right lung status post right chest tube placement. Electronically Signed: Lissette Alley  3/10/2023 3:16 PM EST  Workstation ID: [id number]      Assessment & Plan   ASSESSMENT:  A 89-year-old male with metastatic non-small cell lung cancer on treatment with Keytruda.  After Pleurx catheter placement, oncology will order CT chest to look for signs of disease progression for suspicion that patient is progressing with his recurrent effusions and would likely not be a candidate for chemotherapy.  Possible treatment consideration for Cyramza added to Keytruda based on lung biopsy study. The patient continues to need high flow oxygen to maintain adequate oxygen saturation and continues on IV antibiotic.  Will continue to follow    Squamous cell carcinoma right lung  CT-guided biopsy 9/14/2020-invasive moderately differentiated squamous cell carcinoma, PDL1- 1% positive, TMB high  Cytology right lung fluid consistent with malignant effusion  MRI brain 9/15/2022-negative for metastasis  Palliative radiation right lung completed 9/23/2022  Keytruda x4 cycles completed  US thoracentesis right lung- 3 L removed on 12/21/2022 and 2/22/2023  Now plan for pleurex catheter cardiothoracic surgery consulted. Plan for procedure today     Obstructive pneumonia/acute respiratory failure with hypoxia/COPD  CXR shows moderate to recurrent malignant right pleural effusion  Pulmonology on board- recommends Pleurx catheter  Await surgical consult  Continues oxygen at 15 L high flow  Unasyn IV, Solu-Medrol IV      Sepsis  Lactate 3.2, procalcitonin 0.8 on admission  Blood culture normal at 24  hours      Patient has decided to pursue comfort oriented care only. He has met with hospice . Oncology will sign off please call if any questions.

## 2023-03-12 NOTE — ANESTHESIA POSTPROCEDURE EVALUATION
Patient: Deric Wylie    Procedure Summary     Date: 03/10/23 Room / Location: [facility] OR 06 / [facility] MAIN OR    Anesthesia Start: 1207 Anesthesia Stop: 1252    Procedure: PLEURX CATHETER INSERTION (Right) Diagnosis:       Squamous cell carcinoma of lung, stage IV, right (HCC)      Malignant pleural effusion      (Squamous cell carcinoma of lung, stage IV, right (HCC) [C34.91])      (Malignant pleural effusion [J91.0])    Surgeons: Lio Mancilla MD PhD Provider: John Valverde MD    Anesthesia Type: MAC ASA Status: 4          Anesthesia Type: MAC    Vitals  Vitals Value Taken Time   /89 03/10/23 1340   Temp     Pulse 106 03/10/23 1340   Resp 17 03/10/23 1340   SpO2 95 % 03/10/23 1340           Post Anesthesia Care and Evaluation    Patient location during evaluation: PACU  Patient participation: complete - patient participated  Level of consciousness: awake  Pain scale: See nurse's notes for pain score.  Pain management: adequate    Airway patency: patent  Anesthetic complications: No anesthetic complications  PONV Status: none  Cardiovascular status: acceptable  Respiratory status: acceptable and spontaneous ventilation  Hydration status: acceptable    Comments: Patient seen and examined postoperatively; vital signs stable; SpO2 greater than or equal to 90%; cardiopulmonary status stable; nausea/vomiting adequately controlled; pain adequately controlled; no apparent anesthesia complications; patient discharged from anesthesia care when discharge criteria were met

## 2023-03-13 NOTE — CASE MANAGEMENT/SOCIAL WORK
Case Management Discharge Note      Final Note: Home with [facility]    Provided Post Acute Provider List?: Refused  Refused Provider List Comment: PT/OT eval pending at time of rounds. Pt refused going to rehab at this time.    Selected Continued Care - Discharged on 3/10/2023 Admission date: 3/7/2023 - Discharge disposition: Hospice/Home        Home Medical Care Coordination complete.    Service Provider Selected Services Address Phone Fax Patient Preferred    [facility] [address] [place] [address] [phone] -- --                  Transportation Services  Private: Car    Final Discharge Disposition Code: 50 - home with hospice

## 2023-03-20 NOTE — TELEPHONE ENCOUNTER
I called patients number and left VM also called [facility] contact on chart Guillermina and Joe POPE to return my call. I also called [facility] to check on him as well. They state they see him today and the nurse will call me with an update. We may not need to see this patient is hospice is caring for all his needs.

## 2023-03-23 NOTE — TELEPHONE ENCOUNTER
[facility] and they state they have not been able to reach or set up visit for in person. They provided additional emergency contact information for DARLYN.  I called Deric's number again and AJ answered.  Hospice had given me AJ's number of [phone]. AJ confirmed that Deric is home and not doing well. We discussed his need for a visit with hospice and if he wants to come see us with CXR I am happy to book that. He declines and states they will keep with hospice. AJ states Deric isn't doing well and can't even get out of the bed. We again talked about his need for hospice to see the patient and he agreed.

## 2023-03-28 PROBLEM — Z51.5 HOSPICE CARE: Status: ACTIVE | Noted: 2023-01-01

## 2023-03-28 NOTE — Clinical Note
Level of Care: Med/Surg [1]   Admitting Physician: KANDACE FROST [[id number]]   Attending Physician: KANDACE FROST [[id number]]

## 2023-03-28 NOTE — DISCHARGE PLACEMENT REQUEST
"Deric Wylie (89 y.o. Male)     Date of Birth   02/25/1933    Social Security Number       Address   [address] [place] [facility]    Home Phone   [phone]    MRN   [id number]       [facility]   [employer]    Marital Status                               Admission Date   3/28/23    Admission Type   Emergency    Admitting Provider       Attending Provider   Dhruv Murcia MD    Department, Room/Bed   [facility] EMERGENCY DEPARTMENT, 33/33       Discharge Date       Discharge Disposition       Discharge Destination                               Attending Provider: Dhruv Murcia MD    Allergies: No Known Allergies    Isolation: None   Infection: None   Code Status: Prior    Ht: 182.9 cm (72\")   Wt: 57.2 kg (126 lb)    Admission Cmt: None   Principal Problem: None                Active Insurance as of 3/28/2023     Primary Coverage     Payor Plan Insurance Group Employer/Plan Group    MEDICARE MEDICARE A & B      Payor Plan Address Payor Plan Phone Number Payor Plan Fax Number Effective Dates    [address] [phone]  2/1/1998 - None Entered    Former[facility] [id number]       Subscriber Name Subscriber Birth Date Member ID       DERIC WYLIE 2/25/1933 [id number]           Secondary Coverage     Payor Plan Insurance Group Employer/Plan Group    ANTHEM BLUE CROSS ANTHEM BLUE CROSS BLUE SHIELD PPO [id number]     Payor Plan Address Payor Plan Phone Number Payor Plan Fax Number Effective Dates    [address] [phone]  1/1/2022 - None Entered    [facility] [id number]       Subscriber Name Subscriber Birth Date Member ID       DERIC WYLIE 2/25/1933 [id number]                 Emergency Contacts      (Rel.) Home Phone Work Phone Mobile Phone    Guillermina Reynolds (Neighbor) -- -- [phone]              "

## 2023-03-28 NOTE — H&P
[facility] Medicine Services      Patient Name: Deric Wylie  : 1933  MRN: [id number]  Primary Care Physician:  Bernardo Singh MD  Date of admission: 3/28/2023      Subjective      Chief Complaint: Comfort Care    History of Present Illness: Deric Wylie is a 89 y.o. male who presented to [facility] on 3/28/2023  Due to weakness.  Of note, due to patient condition, HPI obtained from discussion with patient contact and ED staft. Patient is DNR-CC on hospice and staff was unable to get vitals on him today.  Due to his declining state there is difficulty delivering hospice care to patient.  He was sent to [facility] for end of life care.      Case personally discussed with Guillermina Reynolds (listed as patient contact in chart) who confirmed that plan is to remain comfort care with hospice on board.  ED staff also confirmed with family and hospice that patient is hospice and comfort care      ROS   Unable to obtain due to severity of condition    Personal History     Past Medical History:   Diagnosis Date   • Cancer (HCC)     Right lung   • COPD (chronic obstructive pulmonary disease) (HCC)        Past Surgical History:   Procedure Laterality Date   • ABDOMINAL AORTIC ANEURYSM REPAIR     • APPENDECTOMY     • PLEURAL CATHETER INSERTION Right 3/10/2023    Procedure: PLEURX CATHETER INSERTION;  Surgeon: Lio Mancilla MD PhD;  Location: [facility] OR;  Service: Thoracic;  Laterality: Right;       Family History: family history includes Cancer in his brother; Heart disease in his father and mother. Otherwise pertinent FHx was reviewed and not pertinent to current issue.    Social History:  reports that he has quit smoking. He has never used smokeless tobacco. He reports that he does not drink alcohol and does not use drugs.    Home Medications:  Prior to Admission Medications     Prescriptions Last Dose Informant Patient Reported? Taking?    aspirin 81 MG chewable tablet   Yes  No    Chew 1 tablet Daily.    carvedilol (Coreg) 3.125 MG tablet   No No    Take 1 tablet by mouth 2 (Two) Times a Day With Meals.    furosemide (LASIX) 20 MG tablet   No No    Take 2 tablets by mouth Daily.    ipratropium-albuterol (DUO-NEB) 0.5-2.5 mg/3 ml nebulizer   No No    Take 3 mL by nebulization 4 (Four) Times a Day for 7 days.    losartan (Cozaar) 25 MG tablet   No No    Take 0.5 tablets by mouth Daily.    ondansetron (Zofran) 8 MG tablet   No No    Take 1 tablet by mouth 3 (Three) Times a Day As Needed for Nausea or Vomiting.    polyethylene glycol (MIRALAX) 17 g packet   No No    Take 17 g by mouth Daily.    predniSONE (DELTASONE) 10 MG tablet   No No    Take 4 tablets by mouth Daily for 4 days, THEN 3 tablets Daily for 4 days, THEN 2 tablets Daily for 4 days, THEN 1 tablet Daily for 4 days.    traMADol (ULTRAM) 50 MG tablet   No No    Take 1 tablet by mouth Every 6 (Six) Hours As Needed for Moderate Pain.            Allergies:  No Known Allergies    Objective      Vitals:   Heart Rate:  [80] 80  Resp:  [18] 18  BP: (65)/(37) 65/37  Flow (L/min):  [5] 5    Physical Exam  Constitutional:       Comments: AAOx0  Minimal response to sternal rub  Thin/frail   HENT:      Head: Normocephalic and atraumatic.      Mouth/Throat:      Mouth: Mucous membranes are dry.      Pharynx: No oropharyngeal exudate.   Eyes:      General: No scleral icterus.  Cardiovascular:      Rate and Rhythm: Normal rate and regular rhythm.      Heart sounds: No murmur heard.    No gallop.   Pulmonary:      Effort: No respiratory distress.      Breath sounds: No wheezing or rales.   Abdominal:      General: There is no distension.      Tenderness: There is no abdominal tenderness. There is no guarding.   Musculoskeletal:         General: No tenderness, deformity or signs of injury.      Cervical back: Normal range of motion. No rigidity.   Skin:     Coloration: Skin is not jaundiced.      Findings: No bruising or lesion.   Neurological:       General: No focal deficit present.      Mental Status: He is disoriented.      Motor: Weakness present.          Result Review    Result Review:  I have personally reviewed the results from the time of this admission to 3/28/2023 19:04 EDT and agree with these findings:  [x]  Laboratory  []  Microbiology  [x]  Radiology  []  EKG/Telemetry   []  Cardiology/Vascular   []  Pathology  []  Old records  []  Other:        Assessment & Plan        Active Hospital Problems:  There are no active hospital problems to display for this patient.    Plan:     #Hospice Care  #Srage IV right squamous cell lung cancer  #Protein Calorie malnutirition  #COPD      - Confirmed with patient contact (Guillermina Reynolds) that patient is comfort care admission if for comfort    - On call [facility] hospice nurse notified    - Morphine allergy per patient contact    - Will start scheduled and prn comfort meds     - Ativan 1mg IV q6h    - Dilaudid PRN, ok per family    - Ativan 1mg IV q4h PRN    - Robinul 0.4mg IV radha        DVT prophylaxis: comfort care    CODE STATUS:       Admission Status:  I believe this patient meets observation status.    I discussed the patient's findings and my recommendations with Patient contact and hospice and ED staff    This patient has been examined wearing appropriate Personal Protective Equipment and discussed with Nursing, family, hospice and ed staff. 03/28/23      Signature: Electronically signed by Johnna Hinds DO, 03/28/23, 7:32 PM EDT.

## 2023-03-28 NOTE — ED PROVIDER NOTES
Subjective   History of Present Illness  89-year-old male with reported history of stage IV lung cancer presents from home hospice nurse reportedly sending him to to the hospital due to caregivers being unable to manage the patient.  Reportedly is having progressive failure to thrive at home and not eating or drinking or getting out of bed.  Review of Systems    Past Medical History:   Diagnosis Date   • Cancer (HCC)     Right lung   • COPD (chronic obstructive pulmonary disease) (HCC)        No Known Allergies    Past Surgical History:   Procedure Laterality Date   • ABDOMINAL AORTIC ANEURYSM REPAIR     • APPENDECTOMY     • PLEURAL CATHETER INSERTION Right 3/10/2023    Procedure: PLEURX CATHETER INSERTION;  Surgeon: Lio Mancilla MD PhD;  Location: [facility] MAIN OR;  Service: Thoracic;  Laterality: Right;       Family History   Problem Relation Age of Onset   • Heart disease Father    • Heart disease Mother    • Cancer Brother        Social History     Socioeconomic History   • Marital status:    Tobacco Use   • Smoking status: Former   • Smokeless tobacco: Never   Vaping Use   • Vaping Use: Never used   Substance and Sexual Activity   • Alcohol use: Never   • Drug use: Never   • Sexual activity: Defer       Prior to Admission medications    Medication Sig Start Date End Date Taking? Authorizing Provider   aspirin 81 MG chewable tablet Chew 1 tablet Daily.    Provider, MD Shamika   carvedilol (Coreg) 3.125 MG tablet Take 1 tablet by mouth 2 (Two) Times a Day With Meals. 3/11/23   Jose Villasenor MD   furosemide (LASIX) 20 MG tablet Take 2 tablets by mouth Daily. 3/11/23   Jose Villasenor MD   ipratropium-albuterol (DUO-NEB) 0.5-2.5 mg/3 ml nebulizer Take 3 mL by nebulization 4 (Four) Times a Day for 7 days. 3/11/23 3/18/23  Jose Villasenor MD   losartan (Cozaar) 25 MG tablet Take 0.5 tablets by mouth Daily. 3/11/23   Jose Villasenor MD   ondansetron (Zofran) 8 MG tablet Take 1  "tablet by mouth 3 (Three) Times a Day As Needed for Nausea or Vomiting. 10/12/22   Rohini Prado APRN   polyethylene glycol (MIRALAX) 17 g packet Take 17 g by mouth Daily. 11/23/22   Veronica Delgado MD   predniSONE (DELTASONE) 10 MG tablet Take 4 tablets by mouth Daily for 4 days, THEN 3 tablets Daily for 4 days, THEN 2 tablets Daily for 4 days, THEN 1 tablet Daily for 4 days. 3/11/23 3/27/23  Jose Villasenor MD   traMADol (ULTRAM) 50 MG tablet Take 1 tablet by mouth Every 6 (Six) Hours As Needed for Moderate Pain. 1/25/23   Veronica Delgado MD     BP (!) 65/37 (BP Location: Left arm, Patient Position: Sitting)   Pulse 80   Resp 18   Ht 182.9 cm (72\")   Wt 57.2 kg (126 lb)   SpO2 90%   BMI 17.09 kg/m²   I examined the patient using the appropriate personal protective equipment.        Objective   Physical Exam  Very thin cachectic elderly male, voices his name when asked but otherwise is nonverbal.  He is in no apparent distress.  Equal breath sounds bilaterally nonlabored respirations, heart regular rate and rhythm  Abdomen soft nontender nondistended  Extremities no clubbing cyanosis or edema  Back there are some pressure sores on his back  Procedures           ED Course                                           Medical Decision Making  Weakness: complicated acute illness or injury  Amount and/or Complexity of Data Reviewed  External Data Reviewed: notes.     Details: Recent discharge summary reviewed, describing stage IV lung cancer and hospice care  Discussion of management or test interpretation with external provider(s): I contacted Chon with hospice care on-call who does advise that the patient is comfort care measures only and no medical intervention was to be pursued however patient will need placement with inpatient hospice care or respite care at a nursing home facility due to their inability to care for him at home.    Case discussed with Dr. Hinds with the hospitalist service for " admission    Risk  Prescription drug management.  Decision regarding hospitalization.          Final diagnoses:   Weakness       ED Disposition  ED Disposition     ED Disposition   Decision to Admit    Condition   --    Comment   Level of Care: Med/Surg [1]   Admitting Physician: KANDACE FROST [[id number]]   Attending Physician: KANDACE FROST [[id number]]               No follow-up provider specified.       Medication List      ASK your doctor about these medications    predniSONE 10 MG tablet  Commonly known as: DELTASONE  Take 4 tablets by mouth Daily for 4 days, THEN 3 tablets Daily for 4 days, THEN 2 tablets Daily for 4 days, THEN 1 tablet Daily for 4 days.  Start taking on: March 11, 2023  Ask about: Should I take this medication?             Dhruv Murcia MD  03/28/23 1925       Dhruv Murcia MD  03/28/23 1926

## 2023-03-28 NOTE — ED NOTES
[facility] on-call nurse called [phone], awaiting return call. Dr. Murcia notified.

## 2023-03-29 NOTE — DISCHARGE SUMMARY
[facility] Medicine Services  DISCHARGE SUMMARY    Patient Name: Deric Wylie  : 1933  MRN: [id number]    Date of Admission: 3/28/2023  Discharge Diagnosis: Stage 4 Lung cancer  Date of Discharge:  2023  Primary Care Physician: Bernardo Singh MD      Presenting Problem:   Hospice care [Z51.5]  Weakness [R53.1]    Active and Resolved Hospital Problems:  Active Hospital Problems    Diagnosis POA   • **Hospice care [Z51.5] Not Applicable      Resolved Hospital Problems   No resolved problems to display.         Hospital Course     Hospital Course:  Deric Wylie is a 89 y.o. 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  Patient admitted to [facility] for hospice care and possible placement. Plan was confirmed with family and [facility] hospice.  Comfort measures started.  Patient  on 3/29/2023.        DISCHARGE Follow Up Recommendations for labs and diagnostics:             Day of Discharge     Vital Signs:  Heart Rate:  [80-90] 90  Resp:  [18] 18  BP: (61-72)/(34-41) 65/34  Flow (L/min):  [5] 5    Physical Exam:  Physical Exam   Patient     Pertinent  and/or Most Recent Results     LAB RESULTS:                              Brief Urine Lab Results  (Last result in the past 365 days)      Color   Clarity   Blood   Leuk Est   Nitrite   Protein   CREAT   Urine HCG        23 1435 Red  Comment: Any Substance that causes an abnormal urine color can alter the accuracy of the chemical reactions.   Turbid   Large (3+)   --  Comment: The Leukoesterase test cannot be performed due to the centrifugation of the specimen.      Negative   100 mg/dL (2+)               Microbiology Results (last 10 days)     ** No results found for the last 240 hours. **          XR Chest 1 View    Result  Date: 3/10/2023  Impression: Impression: Decreased size of right pleural effusion and improved aeration of the right lung status post right chest tube placement. Electronically Signed: Lissette Staples  3/10/2023 3:16 PM EST  Workstation ID: [id number]    XR Chest 1 View    Result Date: 3/7/2023  Impression: Impression: 1.No improvement in appearance of the right hemithorax with moderate right pleural effusion and probable atelectasis right lung in addition to what could reflect tumor along the superior mediastinum on the right. 2.More prominent interstitial changes within the left lung that may be due to edema or inflammatory infectious process or possibly lymphangitic spread of tumor. 3.Emphysematous changes. 4.Cardiomegaly 5.Atherosclerotic vascular calcification. Electronically Signed: Daniel Cano  3/7/2023 6:50 AM EST  Workstation ID: [id number]                  Labs Pending at Discharge:      Procedures Performed           Consults:   Consults     Date and Time Order Name Status Description    3/28/2023  6:49 PM Hospitalist (on-call MD unless specified)      3/9/2023  9:29 AM Inpatient Thoracic Surgery Consult Completed     3/8/2023  1:20 PM Inpatient Urology Consult Completed     3/7/2023  8:56 AM Inpatient Palliative Care MD Consult Completed     3/7/2023  8:36 AM Inpatient Pulmonology Consult Completed             Discharge Details        Discharge Medications      Stop These Medications    aspirin 81 MG chewable tablet     carvedilol 3.125 MG tablet  Commonly known as: Coreg     furosemide 40 MG tablet  Commonly known as: LASIX     LORazepam 0.5 MG tablet  Commonly known as: ATIVAN     losartan 25 MG tablet  Commonly known as: Cozaar     morphine 20 mg/mL solution concentrated solution     senna 8.6 MG tablet  Commonly known as: SENOKOT     traMADol 50 MG tablet  Commonly known as: ULTRAM            No Known Allergies      Discharge Disposition:     Condition on discharge: patient        Diet:  Hospital:  Diet Order   Procedures   • Diet: Regular/House Diet; Texture: Regular Texture (IDDSI 7); Fluid Consistency: Thin (IDDSI 0)         Discharge Activity:         CODE STATUS:  Code Status and Medical Interventions:   Ordered at: 23     Level Of Support Discussed With:    Next of Kin (If No Surrogate)     Code Status (Patient has no pulse and is not breathing):    No CPR (Do Not Attempt to Resuscitate)     Medical Interventions (Patient has pulse or is breathing):    Comfort Measures         Future Appointments   Date Time Provider Department Center   3/29/2023  1:00 PM INF ROOM 23 - [facility]           Time spent on Discharge including face to face service:  10 minutes        Signature: Electronically signed by Johnna Hinds DO, 23, 6:15 AM EDT.

## 2023-03-29 NOTE — PLAN OF CARE
Goal Outcome Evaluation:               Patient currently resting abed, no distress noted. Patient repositioned by staff. Shortly after patient arrived on unit Mepilex dressing applied to areas on patient. Patient on comfort care.

## 2023-03-29 NOTE — NURSING NOTE
This writer and another member of nursing staff transported patient from ER. Patient will move his arms but appears to have a blank stare and will not respond to vocal stimuli. Patient is comfort measures with medication administered after moving patient to bed. Patient has several areas of pressure injuries to his back, hip, and coccyx. Patient has a dressing in place with bruising on his right side. Patient with fountain cath in place was given in report that it was placed before patient's arrival to hospital.

## 2023-03-29 NOTE — CASE MANAGEMENT/SOCIAL WORK
Case Management Discharge Note      Final Note:          Selected Continued Care - Discharged on 3/29/2023 Admission date: 3/28/2023 - Discharge disposition:         Final Discharge Disposition Code: 41 -  in medical facility

## 2023-03-29 NOTE — NURSING NOTE
Patient passed at 0550. This writer attempted to contact patient's contact Guillermina, message left. Hospitalist notified. Td notified, Chon returned call. This writer spoke with Chon several times. Chon unable to contact family or friends of patient at this time. Chon stated that he would continue to try as he has more contact names and numbers.  notified and patient released due to patient's history.

## (undated) DEVICE — GLV SURG BIOGEL LTX PF 6

## (undated) DEVICE — KT CATH DRN PLEURL PLEURX/SAFE/T SIL 15.5F 66CM 4BT/1000ML

## (undated) DEVICE — CVR HNDL LT SURG ACCSSRY BLU STRL

## (undated) DEVICE — COVER,MAYO STAND,STERILE: Brand: MEDLINE

## (undated) DEVICE — DECANTER: Brand: UNBRANDED

## (undated) DEVICE — SUT MNCRYL 4/0 PS2 27IN UD MCP426H

## (undated) DEVICE — KT SURG TURNOVER 050

## (undated) DEVICE — GOWN,REINFORCE,POLY,SIRUS,BREATH SLV,LG: Brand: MEDLINE

## (undated) DEVICE — GOWN,REINF,POLY,ECL,PP SLV,XXL: Brand: MEDLINE

## (undated) DEVICE — PK MINOR LAPAROTOMY 50

## (undated) DEVICE — ADHS SKIN SURG TISS VISC PREMIERPRO EXOFIN HI/VISC FAST/DRY

## (undated) DEVICE — SUT VIC 2/0 SH 27IN

## (undated) DEVICE — PENCL HND ROCKRSWTCH HOLSTR EZ CLEAN TP CRD 10FT